# Patient Record
Sex: FEMALE | Race: WHITE | NOT HISPANIC OR LATINO | Employment: OTHER | URBAN - METROPOLITAN AREA
[De-identification: names, ages, dates, MRNs, and addresses within clinical notes are randomized per-mention and may not be internally consistent; named-entity substitution may affect disease eponyms.]

---

## 2022-03-09 DIAGNOSIS — Z11.59 ENCOUNTER FOR SCREENING FOR OTHER VIRAL DISEASES: Primary | ICD-10-CM

## 2022-03-14 DIAGNOSIS — K62.1 RECTAL POLYP: Primary | ICD-10-CM

## 2022-03-15 ENCOUNTER — APPOINTMENT (OUTPATIENT)
Dept: LAB | Facility: CLINIC | Age: 72
End: 2022-03-15
Payer: MEDICARE

## 2022-03-15 ENCOUNTER — HOSPITAL ENCOUNTER (OUTPATIENT)
Facility: CLINIC | Age: 72
Discharge: HOME | End: 2022-03-15
Attending: COLON & RECTAL SURGERY | Admitting: COLON & RECTAL SURGERY
Payer: MEDICARE

## 2022-03-15 VITALS
HEART RATE: 71 BPM | TEMPERATURE: 97.5 F | DIASTOLIC BLOOD PRESSURE: 73 MMHG | RESPIRATION RATE: 12 BRPM | OXYGEN SATURATION: 95 % | SYSTOLIC BLOOD PRESSURE: 121 MMHG

## 2022-03-15 LAB — FLEXIBLE SIGMOIDOSCOPY: NORMAL

## 2022-03-15 PROCEDURE — 250N000011 HC RX IP 250 OP 636: Performed by: COLON & RECTAL SURGERY

## 2022-03-15 PROCEDURE — G0500 MOD SEDAT ENDO SERVICE >5YRS: HCPCS | Performed by: COLON & RECTAL SURGERY

## 2022-03-15 PROCEDURE — 88305 TISSUE EXAM BY PATHOLOGIST: CPT | Mod: TC | Performed by: COLON & RECTAL SURGERY

## 2022-03-15 PROCEDURE — 45335 SIGMOIDOSCOPY W/SUBMUC INJ: CPT | Performed by: COLON & RECTAL SURGERY

## 2022-03-15 PROCEDURE — 45331 SIGMOIDOSCOPY AND BIOPSY: CPT | Performed by: COLON & RECTAL SURGERY

## 2022-03-15 PROCEDURE — 999N000099 HC STATISTIC MODERATE SEDATION < 10 MIN: Performed by: COLON & RECTAL SURGERY

## 2022-03-15 RX ORDER — PROCHLORPERAZINE MALEATE 5 MG
5 TABLET ORAL EVERY 6 HOURS PRN
Status: DISCONTINUED | OUTPATIENT
Start: 2022-03-15 | End: 2022-03-15 | Stop reason: HOSPADM

## 2022-03-15 RX ORDER — ONDANSETRON 4 MG/1
4 TABLET, ORALLY DISINTEGRATING ORAL EVERY 6 HOURS PRN
Status: DISCONTINUED | OUTPATIENT
Start: 2022-03-15 | End: 2022-03-15 | Stop reason: HOSPADM

## 2022-03-15 RX ORDER — ONDANSETRON 2 MG/ML
4 INJECTION INTRAMUSCULAR; INTRAVENOUS
Status: DISCONTINUED | OUTPATIENT
Start: 2022-03-15 | End: 2022-03-15 | Stop reason: HOSPADM

## 2022-03-15 RX ORDER — NALOXONE HYDROCHLORIDE 0.4 MG/ML
0.2 INJECTION, SOLUTION INTRAMUSCULAR; INTRAVENOUS; SUBCUTANEOUS
Status: DISCONTINUED | OUTPATIENT
Start: 2022-03-15 | End: 2022-03-15 | Stop reason: HOSPADM

## 2022-03-15 RX ORDER — FENTANYL CITRATE 0.05 MG/ML
25 INJECTION, SOLUTION INTRAMUSCULAR; INTRAVENOUS
Status: DISCONTINUED | OUTPATIENT
Start: 2022-03-15 | End: 2022-03-15 | Stop reason: HOSPADM

## 2022-03-15 RX ORDER — SPIRONOLACTONE 50 MG/1
25 TABLET, FILM COATED ORAL DAILY
COMMUNITY
Start: 2021-11-01 | End: 2022-12-01

## 2022-03-15 RX ORDER — IRBESARTAN 300 MG/1
150 TABLET ORAL DAILY
COMMUNITY
Start: 2021-12-30

## 2022-03-15 RX ORDER — LEVOTHYROXINE SODIUM 25 UG/1
1 TABLET ORAL DAILY
COMMUNITY
Start: 2022-01-04 | End: 2023-01-04

## 2022-03-15 RX ORDER — AMLODIPINE BESYLATE 10 MG/1
10 TABLET ORAL DAILY
COMMUNITY
Start: 2021-09-15 | End: 2022-12-01

## 2022-03-15 RX ORDER — ATROPINE SULFATE 0.4 MG/ML
0.4 AMPUL (ML) INJECTION
Status: DISCONTINUED | OUTPATIENT
Start: 2022-03-15 | End: 2022-03-15 | Stop reason: HOSPADM

## 2022-03-15 RX ORDER — TORSEMIDE 20 MG/1
20 TABLET ORAL 2 TIMES DAILY
Status: ON HOLD | COMMUNITY
Start: 2021-12-30 | End: 2022-05-17

## 2022-03-15 RX ORDER — NALOXONE HYDROCHLORIDE 0.4 MG/ML
0.4 INJECTION, SOLUTION INTRAMUSCULAR; INTRAVENOUS; SUBCUTANEOUS
Status: DISCONTINUED | OUTPATIENT
Start: 2022-03-15 | End: 2022-03-15 | Stop reason: HOSPADM

## 2022-03-15 RX ORDER — FLUMAZENIL 0.1 MG/ML
0.2 INJECTION, SOLUTION INTRAVENOUS
Status: DISCONTINUED | OUTPATIENT
Start: 2022-03-15 | End: 2022-03-15 | Stop reason: HOSPADM

## 2022-03-15 RX ORDER — AMIODARONE HYDROCHLORIDE 200 MG/1
1 TABLET ORAL DAILY
COMMUNITY
Start: 2021-12-30

## 2022-03-15 RX ORDER — LIDOCAINE 40 MG/G
CREAM TOPICAL
Status: DISCONTINUED | OUTPATIENT
Start: 2022-03-15 | End: 2022-03-15 | Stop reason: HOSPADM

## 2022-03-15 RX ORDER — ALBUTEROL SULFATE 90 UG/1
2 AEROSOL, METERED RESPIRATORY (INHALATION) 4 TIMES DAILY PRN
COMMUNITY
Start: 2020-07-29

## 2022-03-15 RX ORDER — ATORVASTATIN CALCIUM 80 MG/1
80 TABLET, FILM COATED ORAL AT BEDTIME
COMMUNITY
Start: 2021-09-15

## 2022-03-15 RX ORDER — FENTANYL CITRATE 0.05 MG/ML
25-50 INJECTION, SOLUTION INTRAMUSCULAR; INTRAVENOUS
Status: COMPLETED | OUTPATIENT
Start: 2022-03-15 | End: 2022-03-15

## 2022-03-15 RX ORDER — SIMETHICONE 40MG/0.6ML
133 SUSPENSION, DROPS(FINAL DOSAGE FORM)(ML) ORAL
Status: DISCONTINUED | OUTPATIENT
Start: 2022-03-15 | End: 2022-03-15 | Stop reason: HOSPADM

## 2022-03-15 RX ORDER — ONDANSETRON 2 MG/ML
4 INJECTION INTRAMUSCULAR; INTRAVENOUS EVERY 6 HOURS PRN
Status: DISCONTINUED | OUTPATIENT
Start: 2022-03-15 | End: 2022-03-15 | Stop reason: HOSPADM

## 2022-03-15 RX ORDER — CARVEDILOL 12.5 MG/1
12.5 TABLET ORAL 2 TIMES DAILY WITH MEALS
COMMUNITY
Start: 2021-08-18 | End: 2022-12-07

## 2022-03-15 RX ORDER — ESCITALOPRAM OXALATE 20 MG/1
20 TABLET ORAL DAILY
COMMUNITY
Start: 2021-12-30 | End: 2022-12-30

## 2022-03-15 RX ORDER — EPINEPHRINE 1 MG/ML
0.1 INJECTION, SOLUTION INTRAMUSCULAR; SUBCUTANEOUS
Status: DISCONTINUED | OUTPATIENT
Start: 2022-03-15 | End: 2022-03-15 | Stop reason: HOSPADM

## 2022-03-15 RX ADMIN — MIDAZOLAM 1 MG: 1 INJECTION INTRAMUSCULAR; INTRAVENOUS at 07:50

## 2022-03-15 RX ADMIN — FENTANYL CITRATE 100 MCG: 0.05 INJECTION, SOLUTION INTRAMUSCULAR; INTRAVENOUS at 07:50

## 2022-03-15 NOTE — H&P
Pre-Endoscopy History and Physical     Janet Figueroa MRN# [id number]   YOB: 1950 Age: 71 year old     Date of Procedure: 3/15/2022  Primary care provider: System, Provider Not In  Type of Endoscopy: colonoscopy  Reason for Procedure: malignant rectal polyp  Type of Anesthesia Anticipated: Moderate Sedation    HPI:    Janet is a 71 year old female who will be undergoing the above procedure.      A history and physical has been performed. The patient's medications and allergies have been reviewed. The risks and benefits of the procedure and the sedation options and risks were discussed with the patient.  All questions were answered and informed consent was obtained.      She denies a personal or family history of anesthesia complications or bleeding disorders.     Allergies   Allergen Reactions     Ciprofloxacin Hives     Alendronic Acid Other (See Comments)     HUT Comment: severe esophageal reflux, jaw pain, arm pain  severe esophageal reflux, jaw pain, arm pain       Clopidogrel Other (See Comments)     Increased bruising and bloody nose  Increased bruising and bloody nose       Codeine Other (See Comments) and Itching     Digoxin Nausea and Vomiting     Lisinopril Cough and Other (See Comments)     HUT Reaction: Cough       Risedronate      HUT Comment: severe esophageal reflux, jaw and arm pain  severe esophageal reflux, jaw and arm pain       Adhesive Tape Rash     And telemetry adhesive     Cephalexin Rash     Chromium Rash        Prior to Admission Medications   Prescriptions Last Dose Informant Patient Reported? Taking?   albuterol (PROAIR HFA/PROVENTIL HFA/VENTOLIN HFA) 108 (90 Base) MCG/ACT inhaler   Yes Yes   Sig: Inhale 2 puffs into the lungs 4 times daily as needed   amLODIPine (NORVASC) 5 MG tablet 3/15/2022 at Unknown time  Yes Yes   Sig: Take 1 tablet by mouth daily   amiodarone (PACERONE) 200 MG tablet 3/15/2022 at Unknown time  Yes Yes   Sig: Take 1 tablet by mouth daily    apixaban ANTICOAGULANT (ELIQUIS) 5 MG tablet 3/12/2022  Yes Yes   Sig: Take 5 mg by mouth 2 times daily   atorvastatin (LIPITOR) 80 MG tablet   Yes Yes   Sig: Take 80 mg by mouth At Bedtime   carvedilol (COREG) 12.5 MG tablet 3/15/2022 at Unknown time  Yes Yes   Sig: Take 12.5 mg by mouth 2 times daily (with meals)   escitalopram (LEXAPRO) 10 MG tablet   Yes Yes   Sig: Take 1 tablet by mouth daily   irbesartan (AVAPRO) 300 MG tablet   Yes Yes   Sig: Take 1 tablet by mouth daily   levothyroxine (SYNTHROID/LEVOTHROID) 25 MCG tablet   Yes Yes   Sig: Take 1 tablet by mouth daily   spironolactone (ALDACTONE) 25 MG tablet   Yes Yes   Sig: Take 1 tablet by mouth daily   torsemide (DEMADEX) 20 MG tablet   Yes Yes   Sig: Take 20 mg by mouth   umeclidinium (INCRUSE ELLIPTA) 62.5 MCG/INH inhaler   Yes Yes   Sig: Inhale 1 puff into the lungs daily      Facility-Administered Medications: None       There is no problem list on file for this patient.       No past medical history on file.     No past surgical history on file.    Social History     Tobacco Use     Smoking status: Not on file     Smokeless tobacco: Not on file   Substance Use Topics     Alcohol use: Not on file       No family history on file.    REVIEW OF SYSTEMS:     5 point ROS negative except as noted above in HPI, including Gen., Resp., CV, GI &  system review.      PHYSICAL EXAM:   BP (!) 129/91   Pulse 60   Temp 97.5  F (36.4  C) (Temporal)   Resp 24   SpO2 97%  There is no height or weight on file to calculate BMI.   GENERAL APPEARANCE: healthy and alert  MENTAL STATUS: alert  AIRWAY EXAM: Mallampatti Class II (visualization of the soft palate, fauces, and uvula)  RESP: lungs clear to auscultation - no rales, rhonchi or wheezes  CV: regular rates and rhythm      DIAGNOSTICS:    Not indicated      IMPRESSION   ASA Class 2 - Mild systemic disease        PLAN:       Plan for colonoscopy. We discussed the risks, benefits and alternatives and the patient  wished to proceed.    The above has been forwarded to the consulting provider.      Signed Electronically by: Lay Connolly MD, MD  March 15, 2022

## 2022-03-16 LAB
PATH REPORT.COMMENTS IMP SPEC: ABNORMAL
PATH REPORT.COMMENTS IMP SPEC: YES
PATH REPORT.FINAL DX SPEC: ABNORMAL
PATH REPORT.GROSS SPEC: ABNORMAL
PATH REPORT.MICROSCOPIC SPEC OTHER STN: ABNORMAL
PATH REPORT.RELEVANT HX SPEC: ABNORMAL
PATH REPORT.SITE OF ORIGIN SPEC: ABNORMAL
PATHOLOGY SYNOPTIC REPORT: ABNORMAL

## 2022-03-21 LAB
PATH REPORT.COMMENTS IMP SPEC: NORMAL
PATH REPORT.COMMENTS IMP SPEC: NORMAL
PATH REPORT.FINAL DX SPEC: NORMAL
PATH REPORT.GROSS SPEC: NORMAL
PATH REPORT.MICROSCOPIC SPEC OTHER STN: NORMAL
PATH REPORT.RELEVANT HX SPEC: NORMAL
PHOTO IMAGE: NORMAL

## 2022-03-21 PROCEDURE — 88305 TISSUE EXAM BY PATHOLOGIST: CPT | Mod: 26 | Performed by: PATHOLOGY

## 2022-04-18 DIAGNOSIS — Z11.59 ENCOUNTER FOR SCREENING FOR OTHER VIRAL DISEASES: Primary | ICD-10-CM

## 2022-04-25 ENCOUNTER — TRANSFERRED RECORDS (OUTPATIENT)
Dept: MULTI SPECIALTY CLINIC | Facility: CLINIC | Age: 72
End: 2022-04-25
Payer: COMMERCIAL

## 2022-04-25 LAB
ALT SERPL-CCNC: 76 U/L (ref 0–55)
AST SERPL-CCNC: 60 U/L (ref 10–40)
CHOLESTEROL (EXTERNAL): 174 MG/DL (ref 0–199)
CREATININE (EXTERNAL): 1.39 MG/DL (ref 0.55–1.02)
GFR ESTIMATED (EXTERNAL): 40 ML/MIN/1.73M2
GLUCOSE (EXTERNAL): 89 MG/DL (ref 70–100)
HDLC SERPL-MCNC: 64 MG/DL
LDL CHOLESTEROL CALCULATED (EXTERNAL): 93 MG/DL
NON HDL CHOLESTEROL (EXTERNAL): 110 MG/DL
POTASSIUM (EXTERNAL): 4 MMOL/L (ref 3.5–5.1)
TRIGLYCERIDES (EXTERNAL): 86 MG/DL
TSH SERPL-ACNC: 4.05 UIU/ML (ref 0.3–4.5)

## 2022-04-27 RX ORDER — ACETAMINOPHEN 500 MG
500-1000 TABLET ORAL EVERY 6 HOURS PRN
COMMUNITY

## 2022-04-27 RX ORDER — NEOMYCIN SULFATE 500 MG/1
500 TABLET ORAL SEE ADMIN INSTRUCTIONS
Status: ON HOLD | COMMUNITY
End: 2022-05-17

## 2022-04-27 RX ORDER — METRONIDAZOLE 500 MG/1
500 TABLET ORAL SEE ADMIN INSTRUCTIONS
Status: ON HOLD | COMMUNITY
End: 2022-05-17

## 2022-04-27 NOTE — PROGRESS NOTES
PTA medications updated by Medication Scribe prior to surgery via phone call with patient (last doses completed by Nurse)     Medication history sources: Patient, Surescripts and H&P  In the past week, patient estimated taking medication this percent of the time: Greater than 90%  Adherence assessment: N/A Not Observed    Significant changes made to the medication list:  None      Additional medication history information:   Patient was advised to bring: Incuse Ellipta Inhaler    Medication reconciliation completed by provider prior to medication history? No    Time spent in this activity: 45 minutes    The information provided in this note is only as accurate as the sources available at the time of update(s)    Prior to Admission medications    Medication Sig Last Dose Taking? Auth Provider   acetaminophen (TYLENOL) 500 MG tablet Take 500-1,000 mg by mouth every 6 hours as needed for mild pain  at prn Yes Reported, Patient   albuterol (PROAIR HFA/PROVENTIL HFA/VENTOLIN HFA) 108 (90 Base) MCG/ACT inhaler Inhale 2 puffs into the lungs 4 times daily as needed  at prn Yes Reported, Patient   amiodarone (PACERONE) 200 MG tablet Take 1 tablet by mouth daily  at am Yes Reported, Patient   amLODIPine (NORVASC) 5 MG tablet Take 1 tablet by mouth daily  at am Yes Reported, Patient   apixaban ANTICOAGULANT (ELIQUIS) 5 MG tablet Take 5 mg by mouth 2 times daily 4/22/2022 at pm Yes Reported, Patient   atorvastatin (LIPITOR) 80 MG tablet Take 80 mg by mouth At Bedtime 4/27/2022 at pm Yes Reported, Patient   carvedilol (COREG) 12.5 MG tablet Take 12.5 mg by mouth 2 times daily (with meals)  at am Yes Reported, Patient   escitalopram (LEXAPRO) 10 MG tablet Take 1 tablet by mouth daily 4/27/2022 at am Yes Reported, Patient   irbesartan (AVAPRO) 300 MG tablet Take 1 tablet by mouth daily 4/26/2022 at am Yes Reported, Patient   levothyroxine (SYNTHROID/LEVOTHROID) 25 MCG tablet Take 1 tablet by mouth daily  at am Yes Reported,  Patient   spironolactone (ALDACTONE) 50 MG tablet Take 25 mg by mouth daily (0.5 x 50 mg = 25 mg) 4/27/2022 at am Yes Reported, Patient   torsemide (DEMADEX) 20 MG tablet Take 20 mg by mouth 2 times daily 4/27/2022 at am Yes Reported, Patient   umeclidinium (INCRUSE ELLIPTA) 62.5 MCG/INH inhaler Inhale 1 puff into the lungs daily  at am Yes Reported, Patient   metroNIDAZOLE (FLAGYL) 500 MG tablet Take 500 mg by mouth See Admin Instructions Three times day before surgery; at 1pm, 2pm, and 11pm 4/27/2022 at 2300  Reported, Patient   neomycin (MYCIFRADIN) 500 MG tablet Take 500 mg by mouth See Admin Instructions Three times day before surgery; at 1pm, 2pm, and 11pm 4/27/2022 at 2300  Reported, Patient     Medication history completed by:    Zachary Garcia CPhT  Medication [facility]

## 2022-04-28 ENCOUNTER — ANESTHESIA (OUTPATIENT)
Dept: SURGERY | Facility: CLINIC | Age: 72
DRG: 330 | End: 2022-04-28
Payer: MEDICARE

## 2022-04-28 ENCOUNTER — HOSPITAL ENCOUNTER (INPATIENT)
Facility: CLINIC | Age: 72
LOS: 19 days | Discharge: HOME HEALTH | DRG: 330 | End: 2022-05-17
Attending: COLON & RECTAL SURGERY | Admitting: COLON & RECTAL SURGERY
Payer: MEDICARE

## 2022-04-28 ENCOUNTER — ANESTHESIA EVENT (OUTPATIENT)
Dept: SURGERY | Facility: CLINIC | Age: 72
DRG: 330 | End: 2022-04-28
Payer: MEDICARE

## 2022-04-28 DIAGNOSIS — R60.9 EDEMA, UNSPECIFIED TYPE: ICD-10-CM

## 2022-04-28 DIAGNOSIS — C20 RECTAL CANCER (H): Primary | ICD-10-CM

## 2022-04-28 DIAGNOSIS — N32.81 OVERACTIVE BLADDER: ICD-10-CM

## 2022-04-28 LAB
ABO/RH(D): NORMAL
ANION GAP SERPL CALCULATED.3IONS-SCNC: 3 MMOL/L (ref 3–14)
ANTIBODY SCREEN: NEGATIVE
BUN SERPL-MCNC: 18 MG/DL (ref 7–30)
CALCIUM SERPL-MCNC: 9.8 MG/DL (ref 8.5–10.1)
CEA SERPL-MCNC: 1 UG/L (ref 0–2.5)
CHLORIDE BLD-SCNC: 110 MMOL/L (ref 94–109)
CO2 SERPL-SCNC: 25 MMOL/L (ref 20–32)
CREAT SERPL-MCNC: 1.18 MG/DL (ref 0.52–1.04)
ERYTHROCYTE [DISTWIDTH] IN BLOOD BY AUTOMATED COUNT: 13.2 % (ref 10–15)
GFR SERPL CREATININE-BSD FRML MDRD: 49 ML/MIN/1.73M2
GLUCOSE BLD-MCNC: 108 MG/DL (ref 70–99)
HCT VFR BLD AUTO: 42.8 % (ref 35–47)
HGB BLD-MCNC: 14.1 G/DL (ref 11.7–15.7)
MCH RBC QN AUTO: 34.1 PG (ref 26.5–33)
MCHC RBC AUTO-ENTMCNC: 32.9 G/DL (ref 31.5–36.5)
MCV RBC AUTO: 104 FL (ref 78–100)
PLATELET # BLD AUTO: 319 10E3/UL (ref 150–450)
POTASSIUM BLD-SCNC: 4.1 MMOL/L (ref 3.4–5.3)
RBC # BLD AUTO: 4.13 10E6/UL (ref 3.8–5.2)
SODIUM SERPL-SCNC: 138 MMOL/L (ref 133–144)
SPECIMEN EXPIRATION DATE: NORMAL
WBC # BLD AUTO: 5.5 10E3/UL (ref 4–11)

## 2022-04-28 PROCEDURE — 370N000017 HC ANESTHESIA TECHNICAL FEE, PER MIN: Performed by: COLON & RECTAL SURGERY

## 2022-04-28 PROCEDURE — 999N000197 HC STATISTIC WOC PT EDUCATION, 0-15 MIN

## 2022-04-28 PROCEDURE — 36415 COLL VENOUS BLD VENIPUNCTURE: CPT | Performed by: COLON & RECTAL SURGERY

## 2022-04-28 PROCEDURE — 258N000003 HC RX IP 258 OP 636: Performed by: COLON & RECTAL SURGERY

## 2022-04-28 PROCEDURE — 250N000011 HC RX IP 250 OP 636: Performed by: NURSE ANESTHETIST, CERTIFIED REGISTERED

## 2022-04-28 PROCEDURE — 250N000009 HC RX 250: Performed by: COLON & RECTAL SURGERY

## 2022-04-28 PROCEDURE — 360N000080 HC SURGERY LEVEL 7, PER MIN: Performed by: COLON & RECTAL SURGERY

## 2022-04-28 PROCEDURE — 120N000001 HC R&B MED SURG/OB

## 2022-04-28 PROCEDURE — 0D1B4Z4 BYPASS ILEUM TO CUTANEOUS, PERCUTANEOUS ENDOSCOPIC APPROACH: ICD-10-PCS | Performed by: COLON & RECTAL SURGERY

## 2022-04-28 PROCEDURE — 80048 BASIC METABOLIC PNL TOTAL CA: CPT | Performed by: COLON & RECTAL SURGERY

## 2022-04-28 PROCEDURE — 250N000011 HC RX IP 250 OP 636: Performed by: COLON & RECTAL SURGERY

## 2022-04-28 PROCEDURE — 258N000003 HC RX IP 258 OP 636: Performed by: ANESTHESIOLOGY

## 2022-04-28 PROCEDURE — 0DBP4ZZ EXCISION OF RECTUM, PERCUTANEOUS ENDOSCOPIC APPROACH: ICD-10-PCS | Performed by: COLON & RECTAL SURGERY

## 2022-04-28 PROCEDURE — 82378 CARCINOEMBRYONIC ANTIGEN: CPT | Performed by: COLON & RECTAL SURGERY

## 2022-04-28 PROCEDURE — 250N000013 HC RX MED GY IP 250 OP 250 PS 637: Performed by: COLON & RECTAL SURGERY

## 2022-04-28 PROCEDURE — 4A1BXSH MONITORING OF GASTROINTESTINAL VASCULAR PERFUSION USING INDOCYANINE GREEN DYE, EXTERNAL APPROACH: ICD-10-PCS | Performed by: COLON & RECTAL SURGERY

## 2022-04-28 PROCEDURE — 272N000001 HC OR GENERAL SUPPLY STERILE: Performed by: COLON & RECTAL SURGERY

## 2022-04-28 PROCEDURE — 85027 COMPLETE CBC AUTOMATED: CPT | Performed by: COLON & RECTAL SURGERY

## 2022-04-28 PROCEDURE — 250N000009 HC RX 250

## 2022-04-28 PROCEDURE — 999N000141 HC STATISTIC PRE-PROCEDURE NURSING ASSESSMENT: Performed by: COLON & RECTAL SURGERY

## 2022-04-28 PROCEDURE — 88309 TISSUE EXAM BY PATHOLOGIST: CPT | Mod: TC | Performed by: COLON & RECTAL SURGERY

## 2022-04-28 PROCEDURE — 8E0W4CZ ROBOTIC ASSISTED PROCEDURE OF TRUNK REGION, PERCUTANEOUS ENDOSCOPIC APPROACH: ICD-10-PCS | Performed by: COLON & RECTAL SURGERY

## 2022-04-28 PROCEDURE — 710N000009 HC RECOVERY PHASE 1, LEVEL 1, PER MIN: Performed by: COLON & RECTAL SURGERY

## 2022-04-28 PROCEDURE — 86901 BLOOD TYPING SEROLOGIC RH(D): CPT | Performed by: COLON & RECTAL SURGERY

## 2022-04-28 PROCEDURE — 250N000009 HC RX 250: Performed by: NURSE ANESTHETIST, CERTIFIED REGISTERED

## 2022-04-28 PROCEDURE — 0DBN4ZZ EXCISION OF SIGMOID COLON, PERCUTANEOUS ENDOSCOPIC APPROACH: ICD-10-PCS | Performed by: COLON & RECTAL SURGERY

## 2022-04-28 PROCEDURE — 258N000001 HC RX 258: Performed by: COLON & RECTAL SURGERY

## 2022-04-28 PROCEDURE — 250N000025 HC SEVOFLURANE, PER MIN: Performed by: COLON & RECTAL SURGERY

## 2022-04-28 PROCEDURE — 0DJD8ZZ INSPECTION OF LOWER INTESTINAL TRACT, VIA NATURAL OR ARTIFICIAL OPENING ENDOSCOPIC: ICD-10-PCS | Performed by: COLON & RECTAL SURGERY

## 2022-04-28 RX ORDER — CLINDAMYCIN PHOSPHATE 900 MG/50ML
900 INJECTION, SOLUTION INTRAVENOUS
Status: COMPLETED | OUTPATIENT
Start: 2022-04-28 | End: 2022-04-28

## 2022-04-28 RX ORDER — ONDANSETRON 2 MG/ML
4 INJECTION INTRAMUSCULAR; INTRAVENOUS EVERY 30 MIN PRN
Status: DISCONTINUED | OUTPATIENT
Start: 2022-04-28 | End: 2022-04-28 | Stop reason: HOSPADM

## 2022-04-28 RX ORDER — SODIUM CHLORIDE, SODIUM LACTATE, POTASSIUM CHLORIDE, CALCIUM CHLORIDE 600; 310; 30; 20 MG/100ML; MG/100ML; MG/100ML; MG/100ML
INJECTION, SOLUTION INTRAVENOUS CONTINUOUS
Status: DISCONTINUED | OUTPATIENT
Start: 2022-04-28 | End: 2022-05-02

## 2022-04-28 RX ORDER — LIDOCAINE HYDROCHLORIDE 20 MG/ML
INJECTION, SOLUTION INFILTRATION; PERINEURAL PRN
Status: DISCONTINUED | OUTPATIENT
Start: 2022-04-28 | End: 2022-04-28

## 2022-04-28 RX ORDER — OXYCODONE HYDROCHLORIDE 5 MG/1
5 TABLET ORAL EVERY 4 HOURS PRN
Status: DISCONTINUED | OUTPATIENT
Start: 2022-04-28 | End: 2022-04-28 | Stop reason: HOSPADM

## 2022-04-28 RX ORDER — BUPIVACAINE HYDROCHLORIDE AND EPINEPHRINE 5; 5 MG/ML; UG/ML
INJECTION, SOLUTION PERINEURAL PRN
Status: DISCONTINUED | OUTPATIENT
Start: 2022-04-28 | End: 2022-04-28 | Stop reason: HOSPADM

## 2022-04-28 RX ORDER — HEPARIN SODIUM 5000 [USP'U]/.5ML
5000 INJECTION, SOLUTION INTRAVENOUS; SUBCUTANEOUS
Status: COMPLETED | OUTPATIENT
Start: 2022-04-28 | End: 2022-04-28

## 2022-04-28 RX ORDER — CLINDAMYCIN PHOSPHATE 900 MG/50ML
900 INJECTION, SOLUTION INTRAVENOUS SEE ADMIN INSTRUCTIONS
Status: DISCONTINUED | OUTPATIENT
Start: 2022-04-28 | End: 2022-04-28 | Stop reason: HOSPADM

## 2022-04-28 RX ORDER — GLYCOPYRROLATE 0.2 MG/ML
INJECTION, SOLUTION INTRAMUSCULAR; INTRAVENOUS PRN
Status: DISCONTINUED | OUTPATIENT
Start: 2022-04-28 | End: 2022-04-28

## 2022-04-28 RX ORDER — HYDROMORPHONE HYDROCHLORIDE 1 MG/ML
0.5 INJECTION, SOLUTION INTRAMUSCULAR; INTRAVENOUS; SUBCUTANEOUS EVERY 5 MIN PRN
Status: DISCONTINUED | OUTPATIENT
Start: 2022-04-28 | End: 2022-04-28 | Stop reason: HOSPADM

## 2022-04-28 RX ORDER — MAGNESIUM HYDROXIDE 1200 MG/15ML
LIQUID ORAL PRN
Status: DISCONTINUED | OUTPATIENT
Start: 2022-04-28 | End: 2022-04-28 | Stop reason: HOSPADM

## 2022-04-28 RX ORDER — NALOXONE HYDROCHLORIDE 0.4 MG/ML
0.4 INJECTION, SOLUTION INTRAMUSCULAR; INTRAVENOUS; SUBCUTANEOUS
Status: DISCONTINUED | OUTPATIENT
Start: 2022-04-28 | End: 2022-05-17 | Stop reason: HOSPADM

## 2022-04-28 RX ORDER — PROPOFOL 10 MG/ML
INJECTION, EMULSION INTRAVENOUS PRN
Status: DISCONTINUED | OUTPATIENT
Start: 2022-04-28 | End: 2022-04-28

## 2022-04-28 RX ORDER — OXYCODONE HYDROCHLORIDE 5 MG/1
5 TABLET ORAL EVERY 4 HOURS PRN
Status: DISCONTINUED | OUTPATIENT
Start: 2022-04-28 | End: 2022-05-12

## 2022-04-28 RX ORDER — HYDROMORPHONE HCL IN WATER/PF 6 MG/30 ML
0.2 PATIENT CONTROLLED ANALGESIA SYRINGE INTRAVENOUS
Status: DISCONTINUED | OUTPATIENT
Start: 2022-04-28 | End: 2022-05-08

## 2022-04-28 RX ORDER — ONDANSETRON 2 MG/ML
4 INJECTION INTRAMUSCULAR; INTRAVENOUS EVERY 6 HOURS PRN
Status: DISCONTINUED | OUTPATIENT
Start: 2022-04-28 | End: 2022-05-17 | Stop reason: HOSPADM

## 2022-04-28 RX ORDER — ENOXAPARIN SODIUM 100 MG/ML
40 INJECTION SUBCUTANEOUS EVERY 24 HOURS
Status: DISCONTINUED | OUTPATIENT
Start: 2022-04-29 | End: 2022-05-03

## 2022-04-28 RX ORDER — LEVOTHYROXINE SODIUM 25 UG/1
25 TABLET ORAL
Status: DISCONTINUED | OUTPATIENT
Start: 2022-04-29 | End: 2022-05-12

## 2022-04-28 RX ORDER — ONDANSETRON 2 MG/ML
INJECTION INTRAMUSCULAR; INTRAVENOUS PRN
Status: DISCONTINUED | OUTPATIENT
Start: 2022-04-28 | End: 2022-04-28

## 2022-04-28 RX ORDER — NEOSTIGMINE METHYLSULFATE 1 MG/ML
VIAL (ML) INJECTION PRN
Status: DISCONTINUED | OUTPATIENT
Start: 2022-04-28 | End: 2022-04-28

## 2022-04-28 RX ORDER — ACETAMINOPHEN 325 MG/1
650 TABLET ORAL EVERY 4 HOURS PRN
Status: DISCONTINUED | OUTPATIENT
Start: 2022-05-01 | End: 2022-05-12

## 2022-04-28 RX ORDER — EPHEDRINE SULFATE 50 MG/ML
INJECTION, SOLUTION INTRAMUSCULAR; INTRAVENOUS; SUBCUTANEOUS PRN
Status: DISCONTINUED | OUTPATIENT
Start: 2022-04-28 | End: 2022-04-28

## 2022-04-28 RX ORDER — PROPOFOL 10 MG/ML
INJECTION, EMULSION INTRAVENOUS CONTINUOUS PRN
Status: DISCONTINUED | OUTPATIENT
Start: 2022-04-28 | End: 2022-04-28

## 2022-04-28 RX ORDER — ACETAMINOPHEN 325 MG/1
975 TABLET ORAL ONCE
Status: COMPLETED | OUTPATIENT
Start: 2022-04-28 | End: 2022-04-28

## 2022-04-28 RX ORDER — ACETAMINOPHEN 325 MG/1
975 TABLET ORAL EVERY 8 HOURS
Status: DISPENSED | OUTPATIENT
Start: 2022-04-28 | End: 2022-05-01

## 2022-04-28 RX ORDER — FENTANYL CITRATE 0.05 MG/ML
25 INJECTION, SOLUTION INTRAMUSCULAR; INTRAVENOUS EVERY 5 MIN PRN
Status: DISCONTINUED | OUTPATIENT
Start: 2022-04-28 | End: 2022-04-28 | Stop reason: HOSPADM

## 2022-04-28 RX ORDER — ESCITALOPRAM OXALATE 10 MG/1
10 TABLET ORAL DAILY
Status: DISCONTINUED | OUTPATIENT
Start: 2022-04-29 | End: 2022-05-12

## 2022-04-28 RX ORDER — ONDANSETRON 4 MG/1
4 TABLET, ORALLY DISINTEGRATING ORAL EVERY 6 HOURS PRN
Status: DISCONTINUED | OUTPATIENT
Start: 2022-04-28 | End: 2022-05-17 | Stop reason: HOSPADM

## 2022-04-28 RX ORDER — SODIUM CHLORIDE, SODIUM LACTATE, POTASSIUM CHLORIDE, CALCIUM CHLORIDE 600; 310; 30; 20 MG/100ML; MG/100ML; MG/100ML; MG/100ML
INJECTION, SOLUTION INTRAVENOUS CONTINUOUS
Status: DISCONTINUED | OUTPATIENT
Start: 2022-04-28 | End: 2022-04-28 | Stop reason: HOSPADM

## 2022-04-28 RX ORDER — FENTANYL CITRATE 50 UG/ML
INJECTION, SOLUTION INTRAMUSCULAR; INTRAVENOUS PRN
Status: DISCONTINUED | OUTPATIENT
Start: 2022-04-28 | End: 2022-04-28

## 2022-04-28 RX ORDER — NALOXONE HYDROCHLORIDE 0.4 MG/ML
0.2 INJECTION, SOLUTION INTRAMUSCULAR; INTRAVENOUS; SUBCUTANEOUS
Status: DISCONTINUED | OUTPATIENT
Start: 2022-04-28 | End: 2022-05-17 | Stop reason: HOSPADM

## 2022-04-28 RX ORDER — DEXAMETHASONE SODIUM PHOSPHATE 4 MG/ML
INJECTION, SOLUTION INTRA-ARTICULAR; INTRALESIONAL; INTRAMUSCULAR; INTRAVENOUS; SOFT TISSUE PRN
Status: DISCONTINUED | OUTPATIENT
Start: 2022-04-28 | End: 2022-04-28

## 2022-04-28 RX ORDER — ONDANSETRON 4 MG/1
4 TABLET, ORALLY DISINTEGRATING ORAL EVERY 30 MIN PRN
Status: DISCONTINUED | OUTPATIENT
Start: 2022-04-28 | End: 2022-04-28 | Stop reason: HOSPADM

## 2022-04-28 RX ADMIN — SODIUM CHLORIDE, POTASSIUM CHLORIDE, SODIUM LACTATE AND CALCIUM CHLORIDE: 600; 310; 30; 20 INJECTION, SOLUTION INTRAVENOUS at 11:48

## 2022-04-28 RX ADMIN — HEPARIN SODIUM 5000 UNITS: 5000 INJECTION, SOLUTION INTRAVENOUS; SUBCUTANEOUS at 12:38

## 2022-04-28 RX ADMIN — Medication 5 MG: at 13:15

## 2022-04-28 RX ADMIN — CLINDAMYCIN PHOSPHATE 900 MG: 900 INJECTION, SOLUTION INTRAVENOUS at 13:01

## 2022-04-28 RX ADMIN — ROCURONIUM BROMIDE 10 MG: 50 INJECTION, SOLUTION INTRAVENOUS at 13:35

## 2022-04-28 RX ADMIN — Medication 5 MG: at 13:32

## 2022-04-28 RX ADMIN — GENTAMICIN SULFATE 340 MG: 40 INJECTION, SOLUTION INTRAMUSCULAR; INTRAVENOUS at 12:30

## 2022-04-28 RX ADMIN — ROCURONIUM BROMIDE 10 MG: 50 INJECTION, SOLUTION INTRAVENOUS at 14:30

## 2022-04-28 RX ADMIN — ROCURONIUM BROMIDE 10 MG: 50 INJECTION, SOLUTION INTRAVENOUS at 18:51

## 2022-04-28 RX ADMIN — HYDROMORPHONE HYDROCHLORIDE 0.5 MG: 1 INJECTION, SOLUTION INTRAMUSCULAR; INTRAVENOUS; SUBCUTANEOUS at 14:03

## 2022-04-28 RX ADMIN — ROCURONIUM BROMIDE 50 MG: 50 INJECTION, SOLUTION INTRAVENOUS at 13:07

## 2022-04-28 RX ADMIN — PROPOFOL 200 MG: 10 INJECTION, EMULSION INTRAVENOUS at 13:07

## 2022-04-28 RX ADMIN — NEOSTIGMINE METHYLSULFATE 5 MG: 1 INJECTION, SOLUTION INTRAVENOUS at 21:07

## 2022-04-28 RX ADMIN — FENTANYL CITRATE 50 MCG: 50 INJECTION, SOLUTION INTRAMUSCULAR; INTRAVENOUS at 13:07

## 2022-04-28 RX ADMIN — FENTANYL CITRATE 50 MCG: 50 INJECTION, SOLUTION INTRAMUSCULAR; INTRAVENOUS at 13:32

## 2022-04-28 RX ADMIN — ROCURONIUM BROMIDE 10 MG: 50 INJECTION, SOLUTION INTRAVENOUS at 15:44

## 2022-04-28 RX ADMIN — CLINDAMYCIN PHOSPHATE 900 MG: 900 INJECTION, SOLUTION INTRAVENOUS at 19:01

## 2022-04-28 RX ADMIN — LIDOCAINE HYDROCHLORIDE 100 MG: 20 INJECTION, SOLUTION INFILTRATION; PERINEURAL at 13:07

## 2022-04-28 RX ADMIN — MIDAZOLAM 2 MG: 1 INJECTION INTRAMUSCULAR; INTRAVENOUS at 13:03

## 2022-04-28 RX ADMIN — HYDROMORPHONE HYDROCHLORIDE 0.5 MG: 1 INJECTION, SOLUTION INTRAMUSCULAR; INTRAVENOUS; SUBCUTANEOUS at 15:03

## 2022-04-28 RX ADMIN — ACETAMINOPHEN 975 MG: 325 TABLET ORAL at 11:49

## 2022-04-28 RX ADMIN — SODIUM CHLORIDE, POTASSIUM CHLORIDE, SODIUM LACTATE AND CALCIUM CHLORIDE: 600; 310; 30; 20 INJECTION, SOLUTION INTRAVENOUS at 19:31

## 2022-04-28 RX ADMIN — ROCURONIUM BROMIDE 10 MG: 50 INJECTION, SOLUTION INTRAVENOUS at 16:37

## 2022-04-28 RX ADMIN — SODIUM CHLORIDE, POTASSIUM CHLORIDE, SODIUM LACTATE AND CALCIUM CHLORIDE: 600; 310; 30; 20 INJECTION, SOLUTION INTRAVENOUS at 15:03

## 2022-04-28 RX ADMIN — Medication 5 MG: at 13:45

## 2022-04-28 RX ADMIN — GLYCOPYRROLATE 0.1 MG: 0.2 INJECTION, SOLUTION INTRAMUSCULAR; INTRAVENOUS at 13:51

## 2022-04-28 RX ADMIN — GLYCOPYRROLATE 0.8 MG: 0.2 INJECTION, SOLUTION INTRAMUSCULAR; INTRAVENOUS at 21:07

## 2022-04-28 RX ADMIN — ONDANSETRON 4 MG: 2 INJECTION INTRAMUSCULAR; INTRAVENOUS at 20:52

## 2022-04-28 RX ADMIN — PROPOFOL 30 MCG/KG/MIN: 10 INJECTION, EMULSION INTRAVENOUS at 13:15

## 2022-04-28 RX ADMIN — DEXAMETHASONE SODIUM PHOSPHATE 4 MG: 4 INJECTION, SOLUTION INTRA-ARTICULAR; INTRALESIONAL; INTRAMUSCULAR; INTRAVENOUS; SOFT TISSUE at 13:18

## 2022-04-28 ASSESSMENT — ACTIVITIES OF DAILY LIVING (ADL)
ADLS_ACUITY_SCORE: 3
ADLS_ACUITY_SCORE: 10
ADLS_ACUITY_SCORE: 3
ADLS_ACUITY_SCORE: 3
ADLS_ACUITY_SCORE: 12
ADLS_ACUITY_SCORE: 3

## 2022-04-28 ASSESSMENT — ENCOUNTER SYMPTOMS: DYSRHYTHMIAS: 1

## 2022-04-28 ASSESSMENT — COPD QUESTIONNAIRES
CAT_SEVERITY: MILD
COPD: 1

## 2022-04-28 ASSESSMENT — LIFESTYLE VARIABLES: TOBACCO_USE: 1

## 2022-04-28 NOTE — INTERVAL H&P NOTE
"I have reviewed the surgical (or preoperative) H&P that is linked to this encounter, and examined the patient. There are no significant changes    Clinical Conditions Present on Arrival:  Clinically Significant Risk Factors Present on Admission                 # Coagulation Defect: home medication list includes an anticoagulant medication   # Obesity: Estimated body mass index is 34.9 kg/m  as calculated from the following:    Height as of this encounter: 1.6 m (5' 3\").    Weight as of this encounter: 89.4 kg (197 lb).       "

## 2022-04-28 NOTE — LETTER
Home RN referral -Discharging today  Name: Janet Figueroa  : 1950  MRN #: [id number]  Primary Care Provider: Melissa Silva MD     Primary Clinic: [address] [place] [id number]     Reason for Hospitalization:  Rectal cancer (H) [C20]  Admit Date/Time: 2022 10:54 AM  Discharge Date: 2022  Payor Source: Payor: BCBS / Plan: BCBS PLATINUM BLUE / Product Type: PPO /     Home today. Orders attached. Thank you!    Fely Siegel RN   [facility]   Phone [phone]   Or [phone]

## 2022-04-28 NOTE — CONSULTS
"[facility] Nurse Ostomy Marking and Education         Data:     History:    ROBOTIC ASSISTED LOW ANTERIOR RESECTION WITH POSSIBLE LOOP ILEOSTOMY      Pt referred by Dr. Connolly    Who is present for marking and education: Pt marked in Preop    Type of ostomy surgery:  Ileostomy    Abdomen:  Large, round, few deep creases           Intervention:       Patient's chart evaluated.      Assessments done today:  Belt line, previous abdominal surgeries and scars, and abdominal muscles.  Pt observed lying, sitting and standing.     Education:  Reviewed upcoming surgery, stoma construction, post-op expectations, and general ostomy cares/concerns.      All patient / family questions answered.    Patient marked with \"x\" using surgical marker then allowed to dry 3 minutes and sealed with 3M Cavilon No Sting barrier film.  Pt marked RUQ          Assessment:       Abdomen marked on the RUQ to avoid creases and pants    Learning needs/ comprehension: Pt is retired hospice nurse, however has some confusion and poor experiences around ostomies and is very fearful to have one herself.          Plan:       Plan:   ? Surgery scheduled for:  Today, pt in preop    Will plan to follow patient post operatively Daily JOHNIE Nye RN CWOCN          "

## 2022-04-28 NOTE — ANESTHESIA PREPROCEDURE EVALUATION
Anesthesia Pre-Procedure Evaluation    Patient: Janet Figueroa   MRN: [id number] : 1950        Procedure : Procedure(s):  ROBOTIC ASSISTED LOW ANTERIOR RESECTION WITH POSSIBLE LOOP ILEOSTOMY          Past Medical History:   Diagnosis Date     Arrhythmia      COPD (chronic obstructive pulmonary disease) (H)      Coronary artery disease      Hypertension      Rectal cancer (H)      Renal disease       No past surgical history on file.   Allergies   Allergen Reactions     Ciprofloxacin Hives     Alendronic Acid Other (See Comments)     HUT Comment: severe esophageal reflux, jaw pain, arm pain  severe esophageal reflux, jaw pain, arm pain       Clopidogrel Other (See Comments)     Increased bruising and bloody nose  Increased bruising and bloody nose       Codeine Other (See Comments) and Itching     Digoxin Nausea and Vomiting     Lisinopril Cough and Other (See Comments)     HUT Reaction: Cough       Risedronate      HUT Comment: severe esophageal reflux, jaw and arm pain  severe esophageal reflux, jaw and arm pain       Adhesive Tape Rash     And telemetry adhesive     Cephalexin Rash     Chromium Rash      Social History     Tobacco Use     Smoking status: Former Smoker     Smokeless tobacco: Never Used   Substance Use Topics     Alcohol use: Not Currently      Wt Readings from Last 1 Encounters:   22 89.4 kg (197 lb)        Anesthesia Evaluation            ROS/MED HX  ENT/Pulmonary:     (+) GILBERT risk factors, hypertension, obese, tobacco use, Past use, mild,  COPD,  (-) sleep apnea   Neurologic:  - neg neurologic ROS     Cardiovascular:     (+) hypertension--CAD --stent-. 1 Drug Eluting Stent. dysrhythmias, a-fib, Previous cardiac testing   Echo: Date: Results:    Stress Test: Date:  Results:  Impressions   Abnormal study after pharmacologic stress. There was a small   amount of ischemia involving the anteroapical wall in the   territory of the left anterior descending coronary artery.    Left ventricular systolic function was normal. Based on   this study, the annual cardiovascular mortality rate is low   (less than 1%).     ECG Reviewed: Date: Results:    Cath: Date: 2019 Results:  Study conclusions   IMPRESSIONS: Proximal RCA has mild-moderate disease (40%),   this lesion is heavily calcified. Mid RCA stent is patent.   Mid LAD has 50% stenosis. FFR of this vessel is   non-significant (0.84).   Principal OM has a 50% ostial stenosis. FFR of this vessel   was non-significant (0.86).   LVEDP was severely eelvated at 29 mm Hg.       METS/Exercise Tolerance:     Hematologic:       Musculoskeletal:       GI/Hepatic:    (-) GERD   Renal/Genitourinary:     (+) renal disease, type: CRI,     Endo: Comment: Pre-DM    (+) thyroid problem, Obesity,     Psychiatric/Substance Use:     (+) psychiatric history depression     Infectious Disease:       Malignancy:   (+) Malignancy, History of GI.GI CA  Active status post Surgery.        Other:            Physical Exam    Airway        Mallampati: III   TM distance: > 3 FB   Neck ROM: full   Mouth opening: > 3 cm    Respiratory Devices and Support         Dental  no notable dental history         Cardiovascular   cardiovascular exam normal       Rhythm and rate: regular     Pulmonary           breath sounds clear to auscultation           OUTSIDE LABS:  CBC:   Lab Results   Component Value Date    WBC 5.5 04/28/2022    HGB 14.1 04/28/2022    HCT 42.8 04/28/2022     04/28/2022     BMP: No results found for: NA, POTASSIUM, CHLORIDE, CO2, BUN, CR, GLC  COAGS: No results found for: PTT, INR, FIBR  POC: No results found for: BGM, HCG, HCGS  HEPATIC: No results found for: ALBUMIN, PROTTOTAL, ALT, AST, GGT, ALKPHOS, BILITOTAL, BILIDIRECT, MIHIR  OTHER: No results found for: PH, LACT, A1C, GISELLE, PHOS, MAG, LIPASE, AMYLASE, TSH, T4, T3, CRP, SED    Anesthesia Plan    ASA Status:  3   NPO Status:  NPO Appropriate    Anesthesia Type: General.     - Airway: ETT    Induction: Intravenous, Propofol.   Maintenance: Balanced.        Consents    Anesthesia Plan(s) and associated risks, benefits, and realistic alternatives discussed. Questions answered and patient/representative(s) expressed understanding.    - Discussed:     - Discussed with:  Patient      - Extended Intubation/Ventilatory Support Discussed: No.      - Patient is DNR/DNI Status: No    Use of blood products discussed: No .     Postoperative Care    Pain management: Multi-modal analgesia.   PONV prophylaxis: Ondansetron (or other 5HT-3), Dexamethasone or Solumedrol     Comments:    Other Comments: Patient is counseled on the anesthesia plan and relevant anesthesia procedures including all risks and benefits. All patient questions were answered.               David Quispe MD

## 2022-04-28 NOTE — LETTER
Transition Communication Hand-off for Care Transitions to Next Level of Care Provider    Name: Janet Figueroa  : 1950  MRN #: [id number]  Primary Care Provider: Melissa Silva MD     Primary Clinic: [address] [place] [address] [place] [address]     Reason for Hospitalization:  Rectal cancer (H) [C20]  Admit Date/Time: 2022 10:54 AM  Discharge Date: 2022  Payor Source: Payor: BCBS / Plan: BCBS PLATINUM BLUE / Product Type: PPO /          Reason for Communication Hand-off Referral: Other home care at discharge    Discharge Plan: home today with Home care through [facility] for new ostomy support and post operative assessments.        Concern for non-adherence with plan of care:   Y/N No  Discharge Needs Assessment:  Needs    Flowsheet Row Most Recent Value   Equipment Currently Used at Home none   # of Referrals Placed by [facility]        Follow-up specialty is recommended: Yes    Follow-up plan:  No future appointments.    Any outstanding tests or procedures:        Referrals     Future Labs/Procedures    Home Care Referral     Comments:    Your provider has ordered home health services. If you have not been contacted within 2 days of your discharge please call the inpatient department phone number at [phone] .    You will be discharge home with [facility].  [facility] will contact you directly to arrange the first visit.      [facility]'s phone number is [phone].            Key Recommendations:  Home today with Home RN services.    Fely Siegel RN   [facility]   Phone [phone]    AVS/Discharge Summary is the source of truth; this is a helpful guide for improved communication of patient story

## 2022-04-28 NOTE — ANESTHESIA PROCEDURE NOTES
Airway       Patient location during procedure: OR       Procedure Start/Stop Times: 4/28/2022 1:09 PM  Staff -        Anesthesiologist:  David Quispe MD       CRNA: Jared Zafar APRN CRNA       Performed By: CRNA  Consent for Airway        Urgency: elective  Indications and Patient Condition       Indications for airway management: kalyan-procedural       Induction type:intravenous       Mask difficulty assessment: 1 - vent by mask    Final Airway Details       Final airway type: endotracheal airway       Successful airway: ETT - single and Oral  Endotracheal Airway Details        ETT size (mm): 7.0       Cuffed: yes       Successful intubation technique: video laryngoscopy       VL Blade Size: Fournier 3       Grade View of Cords: 1       Adjucts: stylet       Position: Right       Measured from: lips       Secured at (cm): 21       Bite block used: None    Post intubation assessment        Placement verified by: capnometry and chest rise        Number of attempts at approach: 1       Number of other approaches attempted: 0       Secured with: pink tape       Ease of procedure: easy       Dentition: Unchanged    Medication(s) Administered   Medication Administration Time: 4/28/2022 1:09 PM

## 2022-04-28 NOTE — LETTER
Home RN Referral  Name: Janet Figueroa  : 1950  MRN #: [id number]  Primary Care Provider: Melissa Silav MD     Primary Clinic: [address] [place] [id number]     Reason for Hospitalization:  Rectal cancer (H) [C20]  Admit Date/Time: 2022 10:54 AM  Discharge Date: TBD  Payor Source: Payor: BCBS / Plan: BCBS PLATINUM BLUE / Product Type: PPO /     Thank you for accepting this referral. I will keep you updated on her discharge date.    Fely Siegel, RN   [facility]   Phone [phone] or [phone]

## 2022-04-29 LAB
ANION GAP SERPL CALCULATED.3IONS-SCNC: 3 MMOL/L (ref 3–14)
BUN SERPL-MCNC: 13 MG/DL (ref 7–30)
CALCIUM SERPL-MCNC: 9 MG/DL (ref 8.5–10.1)
CHLORIDE BLD-SCNC: 112 MMOL/L (ref 94–109)
CO2 SERPL-SCNC: 24 MMOL/L (ref 20–32)
CREAT SERPL-MCNC: 1 MG/DL (ref 0.52–1.04)
GFR SERPL CREATININE-BSD FRML MDRD: 59 ML/MIN/1.73M2
GLUCOSE BLD-MCNC: 99 MG/DL (ref 70–99)
GLUCOSE BLDC GLUCOMTR-MCNC: 132 MG/DL (ref 70–99)
HGB BLD-MCNC: 13.1 G/DL (ref 11.7–15.7)
POTASSIUM BLD-SCNC: 4.1 MMOL/L (ref 3.4–5.3)
SODIUM SERPL-SCNC: 139 MMOL/L (ref 133–144)

## 2022-04-29 PROCEDURE — 250N000013 HC RX MED GY IP 250 OP 250 PS 637: Performed by: NURSE PRACTITIONER

## 2022-04-29 PROCEDURE — 36415 COLL VENOUS BLD VENIPUNCTURE: CPT | Performed by: COLON & RECTAL SURGERY

## 2022-04-29 PROCEDURE — 250N000013 HC RX MED GY IP 250 OP 250 PS 637: Performed by: COLON & RECTAL SURGERY

## 2022-04-29 PROCEDURE — 250N000011 HC RX IP 250 OP 636: Performed by: COLON & RECTAL SURGERY

## 2022-04-29 PROCEDURE — 85018 HEMOGLOBIN: CPT | Performed by: COLON & RECTAL SURGERY

## 2022-04-29 PROCEDURE — 258N000003 HC RX IP 258 OP 636: Performed by: COLON & RECTAL SURGERY

## 2022-04-29 PROCEDURE — 99222 1ST HOSP IP/OBS MODERATE 55: CPT | Mod: AI | Performed by: NURSE PRACTITIONER

## 2022-04-29 PROCEDURE — 80048 BASIC METABOLIC PNL TOTAL CA: CPT | Performed by: COLON & RECTAL SURGERY

## 2022-04-29 PROCEDURE — 120N000001 HC R&B MED SURG/OB

## 2022-04-29 RX ORDER — IRBESARTAN 150 MG/1
300 TABLET ORAL DAILY
Status: DISCONTINUED | OUTPATIENT
Start: 2022-04-29 | End: 2022-04-29

## 2022-04-29 RX ORDER — TORSEMIDE 20 MG/1
20 TABLET ORAL 2 TIMES DAILY
Status: DISCONTINUED | OUTPATIENT
Start: 2022-04-29 | End: 2022-05-13

## 2022-04-29 RX ORDER — SPIRONOLACTONE 25 MG/1
25 TABLET ORAL DAILY
Status: DISCONTINUED | OUTPATIENT
Start: 2022-04-29 | End: 2022-05-17 | Stop reason: HOSPADM

## 2022-04-29 RX ORDER — CARVEDILOL 12.5 MG/1
12.5 TABLET ORAL 2 TIMES DAILY WITH MEALS
Status: DISCONTINUED | OUTPATIENT
Start: 2022-04-29 | End: 2022-05-17 | Stop reason: HOSPADM

## 2022-04-29 RX ORDER — IRBESARTAN 150 MG/1
300 TABLET ORAL AT BEDTIME
Status: DISCONTINUED | OUTPATIENT
Start: 2022-04-29 | End: 2022-05-13

## 2022-04-29 RX ORDER — AMLODIPINE BESYLATE 5 MG/1
5 TABLET ORAL DAILY
Status: DISCONTINUED | OUTPATIENT
Start: 2022-04-29 | End: 2022-05-12

## 2022-04-29 RX ORDER — ALBUTEROL SULFATE 90 UG/1
2 AEROSOL, METERED RESPIRATORY (INHALATION) 4 TIMES DAILY PRN
Status: DISCONTINUED | OUTPATIENT
Start: 2022-04-29 | End: 2022-05-17 | Stop reason: HOSPADM

## 2022-04-29 RX ORDER — SIMETHICONE 80 MG
80 TABLET,CHEWABLE ORAL EVERY 6 HOURS PRN
Status: DISCONTINUED | OUTPATIENT
Start: 2022-04-29 | End: 2022-05-17 | Stop reason: HOSPADM

## 2022-04-29 RX ORDER — AMIODARONE HYDROCHLORIDE 200 MG/1
200 TABLET ORAL DAILY
Status: DISCONTINUED | OUTPATIENT
Start: 2022-04-29 | End: 2022-05-12

## 2022-04-29 RX ORDER — ATORVASTATIN CALCIUM 40 MG/1
80 TABLET, FILM COATED ORAL AT BEDTIME
Status: DISCONTINUED | OUTPATIENT
Start: 2022-04-29 | End: 2022-05-17 | Stop reason: HOSPADM

## 2022-04-29 RX ADMIN — AMIODARONE HYDROCHLORIDE 200 MG: 200 TABLET ORAL at 12:37

## 2022-04-29 RX ADMIN — ACETAMINOPHEN 975 MG: 325 TABLET ORAL at 06:34

## 2022-04-29 RX ADMIN — SODIUM CHLORIDE, POTASSIUM CHLORIDE, SODIUM LACTATE AND CALCIUM CHLORIDE: 600; 310; 30; 20 INJECTION, SOLUTION INTRAVENOUS at 00:33

## 2022-04-29 RX ADMIN — SODIUM CHLORIDE, POTASSIUM CHLORIDE, SODIUM LACTATE AND CALCIUM CHLORIDE: 600; 310; 30; 20 INJECTION, SOLUTION INTRAVENOUS at 18:07

## 2022-04-29 RX ADMIN — UMECLIDINIUM 1 PUFF: 62.5 AEROSOL, POWDER ORAL at 12:38

## 2022-04-29 RX ADMIN — OXYCODONE HYDROCHLORIDE 2.5 MG: 5 TABLET ORAL at 11:44

## 2022-04-29 RX ADMIN — CARVEDILOL 12.5 MG: 12.5 TABLET, FILM COATED ORAL at 12:37

## 2022-04-29 RX ADMIN — ACETAMINOPHEN 975 MG: 325 TABLET ORAL at 00:42

## 2022-04-29 RX ADMIN — IRBESARTAN 300 MG: 150 TABLET ORAL at 22:10

## 2022-04-29 RX ADMIN — LEVOTHYROXINE SODIUM 25 MCG: 25 TABLET ORAL at 06:34

## 2022-04-29 RX ADMIN — AMLODIPINE BESYLATE 5 MG: 5 TABLET ORAL at 12:37

## 2022-04-29 RX ADMIN — ACETAMINOPHEN 975 MG: 325 TABLET ORAL at 22:10

## 2022-04-29 RX ADMIN — ENOXAPARIN SODIUM 40 MG: 40 INJECTION SUBCUTANEOUS at 16:28

## 2022-04-29 RX ADMIN — ACETAMINOPHEN 975 MG: 325 TABLET ORAL at 16:39

## 2022-04-29 RX ADMIN — OXYCODONE HYDROCHLORIDE 5 MG: 5 TABLET ORAL at 16:26

## 2022-04-29 RX ADMIN — ATORVASTATIN CALCIUM 80 MG: 40 TABLET, FILM COATED ORAL at 22:10

## 2022-04-29 RX ADMIN — ESCITALOPRAM OXALATE 10 MG: 10 TABLET ORAL at 08:59

## 2022-04-29 RX ADMIN — CARVEDILOL 12.5 MG: 12.5 TABLET, FILM COATED ORAL at 18:07

## 2022-04-29 ASSESSMENT — ACTIVITIES OF DAILY LIVING (ADL)
ADLS_ACUITY_SCORE: 3
ADLS_ACUITY_SCORE: 8
ADLS_ACUITY_SCORE: 8
ADLS_ACUITY_SCORE: 3
ADLS_ACUITY_SCORE: 3
ADLS_ACUITY_SCORE: 8
ADLS_ACUITY_SCORE: 3
ADLS_ACUITY_SCORE: 4
ADLS_ACUITY_SCORE: 8
ADLS_ACUITY_SCORE: 3
ADLS_ACUITY_SCORE: 8
ADLS_ACUITY_SCORE: 4
ADLS_ACUITY_SCORE: 3
ADLS_ACUITY_SCORE: 3
ADLS_ACUITY_SCORE: 4
ADLS_ACUITY_SCORE: 8
ADLS_ACUITY_SCORE: 3
ADLS_ACUITY_SCORE: 8

## 2022-04-29 NOTE — CONSULTS
"[facility] Nurse Inpatient Ostomy Assessment     Assessment of new loop Ileostomy     Surgery date:  4/28/22  Surgeon:  Dr. Lay Connolly  Procedure:  Low anterior resection with diverting loop ileostomy  Related diagnosis:  Low rectal cancer    WO Assessment & Physical Exam:        Current status: Alert, stream of conscious talking. Emotional at times. Patient is feeling forgetful      Date of last photo: 4/29/22          Stoma location: RUQ    Stoma size: 1 1/8\"    Stoma appearance: pink-red, round and edematous    Mucocutaneous junction:  intact    Peristomal complication(s): none , bruising    Abdominal assessment: distended    Surgical site(s): open to air    NG still in place? No    Output: serosanguinous     Pain: Cramping    Is patient still on a PCA? No      Current pouching system: Geena 1 piece    Pouch last changed:  4/28 surgical dressing    Reason for pouch change today: ostomy education      Learning and comprehension: Patient overwhelmed today and emotional. Patient reports feeling forgetful and does repeat herself at times. Reviewed pouch change, frequency of pouch change, how to empty.       Psychosocial: Patient is overwhelmed post op. She is hoping this will resolve over the weekend.      WOC Plan:         Pouching product plan: Geena 2 piece 57mm flat with ring and lock and roll    Comments: monitor for abdominal changes that may require changes to pouching    Frequency of pouch changes: 2-3x weekly      Pt support system on discharge: patient reports speaking to her son on the phone. But not discussed today    WOC recommend home care?  yes      WOC follow-up plan: daily M-F      Objective Data:       Patient history according to medical record: Janet Figueroa is a 71 year old female admitted on 4/28/2022. She has a past medical history of hypertension, mild COPD, CAD s/p PCI, dyslipidemia, paroxysmal A. Fib on eliquis, CKD, stage IIIb, renal artery " stenosis, hypothyroidism, depression/anxiety, prediabetes, GERD, renal artery stenosis, who is status post low anterior resection with diverting loop ileostomy due to rectal cancer.      Current Diet/Nutrition:   Orders Placed This Encounter      Full Liquid Diet       Output:  I/O last 3 completed shifts:  In: 2400 [I.V.:2400]  Out: 960 [Urine:700; Drains:230; Blood:30]      Labs:   Recent Labs   Lab 04/29/22  0635 04/28/22  1120   HGB 13.1 14.1   WBC  --  5.5         Star Risk Assessment:   Sensory Perception: 4-->no impairment  Moisture: 3-->occasionally moist  Activity: 3-->walks occasionally  Mobility: 3-->slightly limited  Nutrition: 2-->probably inadequate  Friction and Shear: 3-->no apparent problem  Star Score: 18      WOC Interventions:       Patient's chart evaluated    Focus of today's visit: initial fitting, pouch change demonstration , verbal instruction , pouch emptying, output measurement and discharge instructions     Pouch changed 4/29    Participant(s) in teaching session today: patient  and nurse    Change made with ostomy management today: Yes    Supplies: Gathered and at bedside    Educational materials: lakisha folder at bedside. Explained to patient, but did not review today in order to avoid overwhelming    Preparation for discharge: No discharge preparations started    Registered for supply samples? TBD    Discussed plan of care with: Patient and Nurse    Jeane Roach, RN, CWOCN

## 2022-04-29 NOTE — BRIEF OP NOTE
[facility]    Brief Operative Note    Pre-operative diagnosis: Rectal cancer (H) [C20]  Post-operative diagnosis low rectal cancer    Procedure: Procedure(s):  ROBOTIC ASSISTED LOW ANTERIOR RESECTION WITH DIVERTING LOOP ILEOSTOMY  Surgeon: Surgeon(s) and Role:     * Lay Connolly MD - Primary     * Dora Taveras MD - Assisting     * Jayce Bundy PA-C - Assisting  Anesthesia: General   Estimated Blood Loss: 30 mL    Drains: None  Specimens:   ID Type Source Tests Collected by Time Destination   1 : Sigmoid Colon and Rectum with Anastomatic Ring (Distal Free/Proximal on Anvil) Tissue Large Intestine, Colon, Sigmoid/Rectal SURGICAL PATHOLOGY EXAM Lay Connolly MD 4/28/2022  7:52 PM      Findings:   Specimen with tattoo and lesion.  Complications: None.  Implants: * No implants in log *      ADDENDUM:    PATIENT DATA  Indicate Y or N:  Home O2 No  Hemodialysis No  Transplant patient No  Cirrhosis No  Steroids in last 30 days No  Immunomodulators in last 30 days No  Anticoagulation at time of surgery No   List medication n/a  Prior abdominal surgery No  Pelvic irradiation No    Albumin within 30 days if known n/a  Hgb within 30 days if known 14.1  Cr within 30 days if known 1.18  Body mass index is 34.9 kg/m .    OR DATA  Emergent No   <24 hours No   <1 week No  Bowel Prep (Y or N) Yes  Antibiotics (Y or N) Yes  DVT prophylaxis    Heparin Yes   SCD Yes   None No  Drain Yes  ASA (1,2,3,4,5 if unknown) 3  OR time (min) 463  Stents No  Transfuse >/= 2U No  Anastomosis   Stapled Yes   Handsewn No  Leak Test (pos, neg, not done) NEGATIVE    FOR CANCER ALSO COMPLETE:  Preoperative treatment (Y or N)   Chemo No   Radiation No   Diversion No  If rectal cancer   Distance from anal verge (cm) 6   Location    Anterior No    Posterior Yes    Lateral Yes    Circumferential No  Preoperative Stage   U/S Yes   MRI Yes   Clinical Yes   Unknown Yes   T stage (1,2,3,4,5 if unknown)  5   N stage (0,1,2,3 if unknown) 3   M stage (0,1) 0  CEA n/a  Metastatic disease at time of operation (Y or N) No

## 2022-04-29 NOTE — ANESTHESIA POSTPROCEDURE EVALUATION
Patient: Janet Figueroa    Procedure: Procedure(s):  ROBOTIC ASSISTED LOW ANTERIOR RESECTION WITH DIVERTING LOOP ILEOSTOMY       Anesthesia Type:  General    Note:     Postop Pain Control: Uneventful            Sign Out: Well controlled pain   PONV: No   Neuro/Psych: Uneventful            Sign Out: Acceptable/Baseline neuro status   Airway/Respiratory: Uneventful            Sign Out: Acceptable/Baseline resp. status   CV/Hemodynamics: Uneventful            Sign Out: Acceptable CV status   Other NRE: NONE   DID A NON-ROUTINE EVENT OCCUR? No           Last vitals:  Vitals Value Taken Time   /73 04/28/22 2215   Temp 36.6  C (97.8  F) 04/28/22 2130   Pulse 61 04/28/22 2223   Resp 9 04/28/22 2223   SpO2 97 % 04/28/22 2223   Vitals shown include unvalidated device data.    Electronically Signed By: Eugenio Blunt MD  April 28, 2022  10:24 PM

## 2022-04-29 NOTE — CONSULTS
NUTRITION EDUCATION    REASON FOR ASSESSMENT:  Nutrition education on Low Residue Diet    NUTRITION HISTORY:  Information obtained from patient  She is having a difficult time remembering things in her current post-op state so provides little hx  Follows a regular diet at home, skips breakfast and consumes only lunch and dinner   Meals vary but may typically include chicken, fish, whole grain breads, homemade soups, spaghetti, mac and cheese  No known food allergies, but patient notes she often has difficultly digesting ice cream   Uses Mrs Grullon herb seasoning in place of salt     CURRENT DIET ORDER:  Full liquid     NUTRITION DIAGNOSIS:   Food- and nutrition-related knowledge deficit R/t no prior exposure to information as evidenced by patient report and recent abdominal procedure     INTERVENTIONS:  Nutrition Prescription:    Recommended Low Residue diet per Colorectal Surgery      Implementation:    Nutrition Education (Content):  o Reviewed diet guidelines  o Provided diet handouts:  - Low Fiber Nutrition Therapy  - Fiber Content in Common Foods    Nutrition Education (Application):  o Discussed current eating habits and recommended alternative food choice    Anticipate good compliance    Diet Education  -  refer to Education Flowsheet    Goals:    Patient verbalizes understanding of diet    All of the above goals are met during diet education session    Follow Up/Monitoring:    Provide RD contact information for future questions.    MD to provide information on duration of diet once patient discharged     Patient will receive further teaching on diet from [facility] with new ostomy      Tiffanie Richardson RD, LD  Clinical Dietitian   [facility], Ortho, Ortho spine  Pager: [phone]

## 2022-04-29 NOTE — PROGRESS NOTES
"Colon & Rectal Surgery Progress Note             Interval History:   Postop Day #: 1 Robotic assisted LAR with DI for low rectal cancer  Doing well. Up in the chair, no nausea. Pain controlled. HENNY serosang 230cc. Good UOP                Medications:   I have reviewed this patient's current medications               Physical Exam:   Blood pressure (!) 143/65, pulse 61, temperature 97.7  F (36.5  C), temperature source Oral, resp. rate 16, height 1.6 m (5' 3\"), weight 89.4 kg (197 lb), SpO2 98 %.    Intake/Output Summary (Last 24 hours) at 4/29/2022 0823  Last data filed at 4/29/2022 0600  Gross per 24 hour   Intake 2400 ml   Output 960 ml   Net 1440 ml     GEN:  alert  ABD:  Soft, incisions CDI, stoma pink and productive         Data:        Lab Results   Component Value Date     04/29/2022    Lab Results   Component Value Date    CHLORIDE 112 04/29/2022    Lab Results   Component Value Date    BUN 13 04/29/2022      Lab Results   Component Value Date    POTASSIUM 4.1 04/29/2022    Lab Results   Component Value Date    CO2 24 04/29/2022    Lab Results   Component Value Date    CR 1.00 04/29/2022    CR 1.18 04/28/2022        Lab Results   Component Value Date    HGB 13.1 04/29/2022    HGB 14.1 04/28/2022     Lab Results   Component Value Date     04/28/2022     Lab Results   Component Value Date    WBC 5.5 04/28/2022            Assessment and Plan:   Doing well  Stoma care and teaching.  Lovenox now and at discharge  Continue hooker until POD #3  Full liquid diet.  Encourage ambulation  Multimodal pain management.       Lay Connolly MD  [facility]  Office Phone # [phone]    "

## 2022-04-29 NOTE — SIGNIFICANT EVENT
Significant Event Note    received an asap consult from Dr Connolly.    Call back number provided in consult [phone] goes to an answering service which informed me that they did not have Dr Connolly listed as one of their providers. I contacted the patient's nurse and she informed me that there are no acute medical issues that need to be addressed overnight. Patient has been added to the consult list and will be seen in the morning.     Anival Bosch MD

## 2022-04-29 NOTE — CONSULTS
[facility]  Consult Note - Hospitalist Service  Date of Admission:  4/28/2022  Consult Requested by: Dr. Connolly  Reason for Consult: Medical Management     Assessment & Plan   Janet Figueroa is a 71 year old female admitted on 4/28/2022. She has a past medical history of hypertension, mild COPD, CAD s/p PCI, dyslipidemia, paroxysmal A. Fib on eliquis, CKD, stage IIIb, renal artery stenosis, hypothyroidism, depression/anxiety, prediabetes, GERD, renal artery stenosis, who is status post low anterior resection with diverting loop ileostomy due to rectal cancer.    Rectal cancer s/p low anterior resection with diverting loop ileostomy, POD #1  Surgeon Dr. Lay Connolly, surgery completed under general anesthesia, EBL 30 cc, no complications noted.   -  Feels notable distension, diaphragmatic pain radiating into left shoulder/scapula-->will add simethicone.  - Defer surgical management to primary service    CAD s/p PCI RCA, 11/2019 and LCx 07/2008  Chronic diastolic dysfunction  [PTA spironolactone, torsemide]  Follows with cardiology in the outpatient setting, most recent coronary angiogram 01/2020 showed a 50% mid LAD lesion, 50% stenosis in the 1st obtuse marginal branch, no evidence for recurrent occlusive coronary artery disease.  Most recent TTE 11/2019 shows mild left atrial enlargement, LV EF normal at 55, no valve pathology, mild mitral and trace tricuspid regurg were noted.  Since admission, the patient is net +1.4 L, urine output adequate, she is currently on a clear liquid diet, tolerating well, likely will advance today.   - if breakfast/diet advancement goes well, will stop IVF  - She was evaluated prior to surgery and was believed to be optimized, no changes were made.   - Hold spironolactone, torsemide for now    Hypertension  Dyslipidemia  BP has been running on elevated side, 140s/70s.  Discussed with cardiology preoperatively, no changes were made but if hypertension  "persists, recommendations were to increase Coreg to 12.5 in the morning, and 25 mg in the evening daily.  Postoperatively, BP is near baseline at 140s/60s, heart rate 61.  -PTA amlodipine, Coreg, irbesartan restarted with parameters  - Continue atorvastatin    Paroxysmal atrial fibrillation s/p cardioversions, 2019, 2020   Pre op ECG shows NSR w/ 1st deg AV block, QTc upper limits of normal at 463. CHADS2-VASC score of 5 for age, female gender, hypertension, coronary artery disease and congestive heart failure history, she is on Eliquis, which was stopped 48 hours prior to surgery.  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  - Avoid nephrotoxic meds, recheck BMP tomorrow a.m.    Prediabetes  Fasting glucose this a.m. 99  - monitor for hypoglycemia    Hypothyroidism  - Continue levothyroxine    Depression  Anxiety  - Continue Lexapro    COPD, Mild  Not on home O2, has been clinically stable.  - Continue albuterol every 4 as needed and Incruse Ellipta 1 puff daily       The patient's care was discussed with the Attending Physician, Dr. Varags, Bedside Nurse and Patient.    ROBERT Slaughter [facility]  Securely message with the Vocera Web Console (learn more here)  Text page via [facility] Paging/[facility]       Hospitalist Service    Clinically Significant Risk Factors Present on Admission               # Coagulation Defect: home medication list includes an anticoagulant medication    # Obesity: Estimated body mass index is 34.9 kg/m  as calculated from the following:    Height as of this encounter: 1.6 m (5' 3\").    Weight as of this encounter: 89.4 kg (197 lb).      ______________________________________________________________________    Chief Complaint   Elective lower anterior resection " with possible ileostomy for rectal cancer    History is obtained from the patient    History of Present Illness   Janet Figueroa is a 71 year old female who has a past medical history of hypertension, mild COPD, CAD s/p PCI, dyslipidemia, paroxysmal A. Fib on eliquis, CKD, stage IIIb, renal artery stenosis, hypothyroidism, depression/anxiety, prediabetes, GERD, renal artery stenosis, who is status post low anterior resection with diverting loop ileostomy due to rectal cancer.    Patient noted blood in her stool and rectal bleeding, was evaluated by GI and underwent colonoscopy that showed differentiated rectal adenocarcinoma.  No surgical complications, completed by Dr. Connolly, EBL 30cc.     I evaluated the patient in the morning of postop day #1, she feels anxious and foggy but pain is mostly tolerable. She feels notable distension in her abdomen, with diaphragmatic pain referred to her left shoulder/across scapula. She denies any chest pain, dyspnea, nausea/vomiting.    Review of Systems   The 10 point Review of Systems is negative other than noted in the HPI or here.     Past Medical History    I have reviewed this patient's medical history and updated it with pertinent information if needed.   Past Medical History:   Diagnosis Date     Arrhythmia      COPD (chronic obstructive pulmonary disease) (H)      Coronary artery disease      Hypertension      Rectal cancer (H)      Renal disease        Past Surgical History   I have reviewed this patient's surgical history and updated it with pertinent information if needed.  History reviewed. No pertinent surgical history.    Social History   I have reviewed this patient's social history and updated it with pertinent information if needed.  Social History     Tobacco Use     Smoking status: Former Smoker     Smokeless tobacco: Never Used   Substance Use Topics     Alcohol use: Not Currently       Family History   I have reviewed this patient's family history and  updated it with pertinent information if needed.  Family History   Problem Relation Age of Onset     Coronary Artery Disease Mother      Lung Cancer Mother      Coronary Artery Disease Father      Kidney failure Father      Hypertension Sister      Liver Cancer Sister      Hypertension Brother        Medications   I have reviewed this patient's current medications    Allergies   Allergies   Allergen Reactions     Ciprofloxacin Hives     Alendronic Acid Other (See Comments)     HUT Comment: severe esophageal reflux, jaw pain, arm pain  severe esophageal reflux, jaw pain, arm pain       Clopidogrel Other (See Comments)     Increased bruising and bloody nose  Increased bruising and bloody nose       Codeine Other (See Comments) and Itching     Digoxin Nausea and Vomiting     Lisinopril Cough and Other (See Comments)     HUT Reaction: Cough       Risedronate      HUT Comment: severe esophageal reflux, jaw and arm pain  severe esophageal reflux, jaw and arm pain       Adhesive Tape Rash     And telemetry adhesive     Cephalexin Rash     Chromium Rash       Physical Exam   Vital Signs: Temp: 98.2  F (36.8  C) Temp src: Oral BP: (!) 140/56 Pulse: 69   Resp: 14 SpO2: 98 % O2 Device: None (Room air) Oxygen Delivery: 2 LPM  Weight: 197 lbs 0 oz    Constitutional: awake, alert, cooperative, no apparent distress, and appears stated age  Eyes: pupils equal, round and reactive to light and extra-ocular muscles intact  Respiratory: No increased work of breathing, good air exchange, clear to auscultation bilaterally, no crackles or wheezing  Cardiovascular: regular rate and rhythm, no murmur noted and trace edema  GI: distended, firm, diffusely tender. ileostomy in place, stoma red/round, brown liquid drainage. HENNY in place with sanguinous drainage.   Skin: pale, warm/dry  Musculoskeletal: purposeful movement, extremities strong/equal  Neuropsychiatric: General: normal and normal eye contact  Level of consciousness: alert /  normal  Affect: anxious    Data   Results for orders placed or performed during the hospital encounter of 04/28/22 (from the past 24 hour(s))   CBC with platelets   Result Value Ref Range    WBC Count 5.5 4.0 - 11.0 10e3/uL    RBC Count 4.13 3.80 - 5.20 10e6/uL    Hemoglobin 14.1 11.7 - 15.7 g/dL    Hematocrit 42.8 35.0 - 47.0 %     (H) 78 - 100 fL    MCH 34.1 (H) 26.5 - 33.0 pg    MCHC 32.9 31.5 - 36.5 g/dL    RDW 13.2 10.0 - 15.0 %    Platelet Count 319 150 - 450 10e3/uL   ABO/Rh type and screen    Narrative    The following orders were created for panel order ABO/Rh type and screen.  Procedure                               Abnormality         Status                     ---------                               -----------         ------                     Adult Type and Screen[[id number]]                            Final result                 Please view results for these tests on the individual orders.   Basic metabolic panel   Result Value Ref Range    Sodium 138 133 - 144 mmol/L    Potassium 4.1 3.4 - 5.3 mmol/L    Chloride 110 (H) 94 - 109 mmol/L    Carbon Dioxide (CO2) 25 20 - 32 mmol/L    Anion Gap 3 3 - 14 mmol/L    Urea Nitrogen 18 7 - 30 mg/dL    Creatinine 1.18 (H) 0.52 - 1.04 mg/dL    Calcium 9.8 8.5 - 10.1 mg/dL    Glucose 108 (H) 70 - 99 mg/dL    GFR Estimate 49 (L) >60 mL/min/1.73m2   CEA   Result Value Ref Range    CEA 1.0 0.0 - 2.5 ug/L    Narrative    Assay Method:  Chemiluminescence using Siemens Centaur  XP   Adult Type and Screen   Result Value Ref Range    ABO/RH(D) A POS     Antibody Screen Negative Negative    SPECIMEN EXPIRATION DATE [id number]    Glucose by meter   Result Value Ref Range    GLUCOSE BY METER POCT 132 (H) 70 - 99 mg/dL   Basic metabolic panel   Result Value Ref Range    Sodium 139 133 - 144 mmol/L    Potassium 4.1 3.4 - 5.3 mmol/L    Chloride 112 (H) 94 - 109 mmol/L    Carbon Dioxide (CO2) 24 20 - 32 mmol/L    Anion Gap 3 3 - 14 mmol/L    Urea Nitrogen 13 7 - 30  mg/dL    Creatinine 1.00 0.52 - 1.04 mg/dL    Calcium 9.0 8.5 - 10.1 mg/dL    Glucose 99 70 - 99 mg/dL    GFR Estimate 59 (L) >60 mL/min/1.73m2   Hemoglobin   Result Value Ref Range    Hemoglobin 13.1 11.7 - 15.7 g/dL

## 2022-04-29 NOTE — PLAN OF CARE
Patient vital signs are at baseline: Yes  Patient able to ambulate as they were prior to admission or with assist devices provided by therapies during their stay:  No,  Reason:  Due to walk   Patient MUST void prior to discharge:  No,  Reason:  Bladder cath Gottlieb    Patient able to tolerate oral intake:  No,  Reason:  Clear liquid diet   Pain has adequate pain control using Oral analgesics:  Yes  Does patient have an identified :  No,  Reason:  Patient family to get in touch  Has goal D/C date and time been discussed with patient:  No,  Reason:  Not just determined

## 2022-04-29 NOTE — PLAN OF CARE
Goal Outcome Evaluation:  Alert and oriented X4, vss on ra.  Pt has two Periferal iv on lower R and L hand.  LR 50ml infusing on L arm, jesus drain on let stomach.

## 2022-04-29 NOTE — DISCHARGE INSTRUCTIONS
New Ileostomy     Surgery date:  4/28/22  Surgeon:  Dr. Lay Connolly  Procedure:  Low anterior resection with diverting loop ileostomy  Related diagnosis:  Low rectal cancer    General:   1. Change appliance 2-3x/week and as needed for any leakage.    2. Empty pouch when 1/3 full. Initially wake to check need for pouch emtpying at least once during the night.    3. Carry an extra pouching system with you at all times (can prepare the barrier ahead of time - cut out the opening and apply ring).  4. Diet: Eat 6 small meals/day.  Drink 8oz/fluid every hour during waking hours. Chew all food well!    5. Diet: Eat pasta, rice, potatoes, hot/dry cereal, toast, breads, pretzels, chips, crackers, yogurt, meat, fish, eggs, cheese, peanut butter, bananas to thicken the stool. Eat 4-6 servings of protein/day.  6. Avoid eating: Seeds, nuts, peels, raw vegetables, chinese vegetables, casings on meats, grape/prune juice, grapefruit, oranges, pineapple, mushrooms.   7. NO laxatives. NO timed-released medications.   8. May shower with appliance on or off. Blow dry (on cool setting) cloth backing and tape following bathing.   9. Change your appliance in the morning before breakfast (less likely to have stool coming out at this time).  10. Walk frequently but no heavy lifting.  11. Can use an odor eliminator (Ex: Febreeze) in the room prior to emptying or changing appliance.  12. Initially monitor your output to avoid dehydration.  Call Physician if your output exceeds 1500cc/24hrs.    Supplies:  (Ask your home care nurse to help you order supplies.  Your product plan may change over time, as your stoma and/or your abdomen changes).    -Pt has supplies for discharge   -[facility] to reinforce appliance change with client.     Geena New Image 2-pc (20 pouch changes/month)   1.  Barrier: New Image Flextend convex  cut-to-fit with tape    -   #44427 (5/box = 4box/month)   2.  Pouch: high output   -                                             "                  #61571  (10/box = 2 box/month)  and/or  3.  Pouch: drainable lock n roll    -                                                #95927 (clear pouch) or  #61456 (beige pouch)  (10/box = 2 box/month)  4.  Ring:  [place] 2\" CeraRing   -                                                #8805  (10/box = 2 box/month)  5.  3M Cavilon no-sting skin barrier (optional if needed)   -         #3344  (50/box = 1 box as needed)  6.  Stoma powder (if needed)   -                                                  #7906  (1 bottle as needed)  7.  Ostomy belt (optional)   -                                                       #7299  (large) -  1/month  8.  Odor eliminator drops (optional)   -   #7717  (8oz bottle) or #7715 (1oz bottle, box of 12)  9. Belly Band @ [employer]     Pouching procedure:  1.  Cut out opening in barrier following pattern.   Just inside ! 1/2\" line  2.  Remove plastic backing.  3.  Open ring, stretch and apply around the cut opening on sticky side of barrier.  Press into place.  Note:  can 'build up' the ring to fill in any divots in peristomal skin.     4.  Fold back edge of paper that covers the tape to create a \"tab.\"  This assists with paper removal later on.  5.  May attach pouch to barrier at this time.  Set prepared pouch aside.  6.  Remove old pouch from around stoma.  Discard.   7.  Cleanse around stoma with water only.  Dry well.     8.  Apply pouch:  Ensure skin completely dry.  Pull up on abdomen to create flat pouching surface.  Center stoma in barrier opening and press barrier firmly to skin.  9.  Pull on \"tabs\" to remove paper that covers the tape.  Press tape to skin.  Ok if there are wrinkles in the tape.    10.  Attach pouch to barrier, if have not already done.  Ensure pouch is closed.   11.  Hold warm hand over stoma/barrier for a few minutes, to help pouch form a good secure bond with the skin.      Online ostomy resources: [employer] website, has good videos - " https://www.[employer].com/en/ostomycare/educationaltools   - See also [email or web address].[email or web address]; [email or web address].org  - Smart phone janet: Ostobuddy      Follow-up as needed (after home care is done):     Dionne De Guzman Amanda, Kaitlin - CWOCNs (ostomy nurses)  Clinic room is on 1st floor in [facility] - check in at [place] Desk in [facility] phone # [phone] (Mon - Fri) - call for any questions or concerns

## 2022-04-29 NOTE — ANESTHESIA CARE TRANSFER NOTE
Patient: Janet Figueroa    Procedure: Procedure(s):  ROBOTIC ASSISTED LOW ANTERIOR RESECTION WITH DIVERTING LOOP ILEOSTOMY       Diagnosis: Rectal cancer (H) [C20]  Diagnosis Additional Information: No value filed.    Anesthesia Type:   General     Note:    Oropharynx: oropharynx clear of all foreign objects and spontaneously breathing  Level of Consciousness: awake  Oxygen Supplementation: face mask  Level of Supplemental Oxygen (L/min / FiO2): 6  Independent Airway: airway patency satisfactory and stable  Dentition: dentition unchanged  Vital Signs Stable: post-procedure vital signs reviewed and stable  Report to RN Given: handoff report given  Patient transferred to: PACU    Handoff Report: Identifed the Patient, Identified the Reponsible Provider, Reviewed the pertinent medical history, Discussed the surgical course, Reviewed Intra-OP anesthesia mangement and issues during anesthesia, Set expectations for post-procedure period and Allowed opportunity for questions and acknowledgement of understanding      Vitals:  Vitals Value Taken Time   /78 04/28/22 2131   Temp     Pulse 68 04/28/22 2133   Resp 17 04/28/22 2133   SpO2 100 % 04/28/22 2133   Vitals shown include unvalidated device data.    Electronically Signed By: ROBERT Oliver CRNA  April 28, 2022  9:35 PM

## 2022-04-29 NOTE — OP NOTE
OPERATIVE REPORT     PREOPERATIVE DIAGNOSIS:  Rectal cancer      POSTOPERATIVE DIAGNOSIS:  Same     OPERATIONS PERFORMED:  1.  Robotic assisted low anterior resection with colorectal anastomosis  2.  Laparoscopic mobilization of the splenic flexure  3.  Rigid proctoscopy with anastomotic leak testing  4.  Diverting loop ileostomy   5.  Intra-operative fluorescence angiography      SURGEON:  Lay Connolly MD     COSURGEON: Dora Taveras MD      ASSISTANT: TAISHA Castanon MD, Colon and Rectal Surgery Fellow      ANESTHESIA:  General.     INDICATIONS FOR PROCEDURE:  Janet Figueroa is a 71 year old woman who underwent colonoscopic evaluation for rectal bleeding and was found to have a 2 cm rectal polyp which was removed in a piecemeal fashion.  Unfortunately pathology was consistent with a rectal adenocarcinoma.  Repeat biopsies of the residual*in the colon also demonstrated residual adenocarcinoma.  She saw Dr. Lay Connolly in consultation and discussed the risks and benefits of local excision versus radical resection.  She has agreed to proceed with a robotic assisted low anterior resection with diverting loop ileostomy.  The risks and benefits of this procedure have been discussed and informed consent was obtained.    OPERATIVE FINDINGS:   Scar from previous polypectomy with likely residual mid malignancy in the distal rectum in the left posterior position with a small tattoo.  No evidence of intra-abdominal spread of disease.  A size 28 EEA stapler was used to create the colo-rectal anastomosis.  The anastomosis was located at 4 cm from the anal verge and was hemostatic.  Insufflation from below did not reveal any evidence of leak.         PROCEDURE IN DETAIL:  See Dr. Connolly's operative report for full details of the surgery.  I bedside assisted for a portion of the case, assisted with the deep pelvic dissection and also robotic transection of the rectum distal to the mass and tattoo.  I  then performed the stapled anastomosis from below and rigid proctoscopy.  I went below and passed the 28 EEA stapler into the anal canal, advancing the spike through the staple line of the rectum.  The 2 ends of the stapler were connected, tightened and fired.  Two anastomotic doughnuts were completely intact and were sent to pathology.  The anastomosis was then tested by insufflating from below through the proctoscope with the anastomosis under saline.  There was no evidence of leak despite multiple insufflations.  Excess air was evacuated from below through the proctoscope, and the abdomen was irrigated with a copious amount of saline solution and sucked dry.  Hemostasis was evident. Dr. Connolly then completed the diverting loop ileostomy and the abdominal closure.    Dora Taveras MD

## 2022-04-29 NOTE — PLAN OF CARE
Goal Outcome Evaluation:  Dressing change complete on  jesus drain site. New ileostomy bag placed by WOC.  Pt aox4 but seemed confused at times and asked repeated questions. Pt voids via urinary hooker and ileostomy, up w/1 ast GBW. Pain interventions oxycodone. Compliant with full liquid diet.

## 2022-04-29 NOTE — CONSULTS
Received standard post-op orders for Low Residue diet education.  Chart reviewed, patient had low anterior resection with diverting loop ileostomy on 4/28/22.  Patient is currently on Full Liquids.   Diet education at needed by unit RD (CN following for diet teaching as well)    Gisele Dia RD, LD  Clinical Dietitian - [facility]   Pager - [phone]

## 2022-04-30 LAB
ALBUMIN UR-MCNC: NEGATIVE MG/DL
ANION GAP SERPL CALCULATED.3IONS-SCNC: 3 MMOL/L (ref 3–14)
APPEARANCE UR: CLEAR
BACTERIA #/AREA URNS HPF: ABNORMAL /HPF
BILIRUB UR QL STRIP: NEGATIVE
BUN SERPL-MCNC: 9 MG/DL (ref 7–30)
CALCIUM SERPL-MCNC: 8.6 MG/DL (ref 8.5–10.1)
CHLORIDE BLD-SCNC: 111 MMOL/L (ref 94–109)
CO2 SERPL-SCNC: 25 MMOL/L (ref 20–32)
COLOR UR AUTO: ABNORMAL
CREAT SERPL-MCNC: 0.9 MG/DL (ref 0.52–1.04)
GFR SERPL CREATININE-BSD FRML MDRD: 67 ML/MIN/1.73M2
GLUCOSE BLD-MCNC: 94 MG/DL (ref 70–99)
GLUCOSE BLDC GLUCOMTR-MCNC: 84 MG/DL (ref 70–99)
GLUCOSE UR STRIP-MCNC: NEGATIVE MG/DL
HGB BLD-MCNC: 11.9 G/DL (ref 11.7–15.7)
HGB UR QL STRIP: ABNORMAL
KETONES UR STRIP-MCNC: NEGATIVE MG/DL
LEUKOCYTE ESTERASE UR QL STRIP: ABNORMAL
NITRATE UR QL: NEGATIVE
PH UR STRIP: 5.5 [PH] (ref 5–7)
POTASSIUM BLD-SCNC: 3.8 MMOL/L (ref 3.4–5.3)
RBC URINE: 5 /HPF
SODIUM SERPL-SCNC: 139 MMOL/L (ref 133–144)
SP GR UR STRIP: 1.01 (ref 1–1.03)
UROBILINOGEN UR STRIP-MCNC: NORMAL MG/DL
WBC URINE: 29 /HPF

## 2022-04-30 PROCEDURE — 87086 URINE CULTURE/COLONY COUNT: CPT | Performed by: INTERNAL MEDICINE

## 2022-04-30 PROCEDURE — 250N000011 HC RX IP 250 OP 636: Performed by: COLON & RECTAL SURGERY

## 2022-04-30 PROCEDURE — 80048 BASIC METABOLIC PNL TOTAL CA: CPT | Performed by: NURSE PRACTITIONER

## 2022-04-30 PROCEDURE — 120N000001 HC R&B MED SURG/OB

## 2022-04-30 PROCEDURE — 250N000013 HC RX MED GY IP 250 OP 250 PS 637: Performed by: NURSE PRACTITIONER

## 2022-04-30 PROCEDURE — 81001 URINALYSIS AUTO W/SCOPE: CPT | Performed by: INTERNAL MEDICINE

## 2022-04-30 PROCEDURE — 250N000013 HC RX MED GY IP 250 OP 250 PS 637: Performed by: COLON & RECTAL SURGERY

## 2022-04-30 PROCEDURE — 258N000003 HC RX IP 258 OP 636: Performed by: COLON & RECTAL SURGERY

## 2022-04-30 PROCEDURE — 36415 COLL VENOUS BLD VENIPUNCTURE: CPT | Performed by: COLON & RECTAL SURGERY

## 2022-04-30 PROCEDURE — 85018 HEMOGLOBIN: CPT | Performed by: COLON & RECTAL SURGERY

## 2022-04-30 PROCEDURE — 99233 SBSQ HOSP IP/OBS HIGH 50: CPT | Performed by: INTERNAL MEDICINE

## 2022-04-30 RX ADMIN — ACETAMINOPHEN 975 MG: 325 TABLET ORAL at 07:04

## 2022-04-30 RX ADMIN — IRBESARTAN 300 MG: 150 TABLET ORAL at 21:37

## 2022-04-30 RX ADMIN — CARVEDILOL 12.5 MG: 12.5 TABLET, FILM COATED ORAL at 10:01

## 2022-04-30 RX ADMIN — AMLODIPINE BESYLATE 5 MG: 5 TABLET ORAL at 09:59

## 2022-04-30 RX ADMIN — SODIUM CHLORIDE, POTASSIUM CHLORIDE, SODIUM LACTATE AND CALCIUM CHLORIDE: 600; 310; 30; 20 INJECTION, SOLUTION INTRAVENOUS at 12:51

## 2022-04-30 RX ADMIN — HYDROMORPHONE HYDROCHLORIDE 0.2 MG: 0.2 INJECTION, SOLUTION INTRAMUSCULAR; INTRAVENOUS; SUBCUTANEOUS at 23:57

## 2022-04-30 RX ADMIN — ENOXAPARIN SODIUM 40 MG: 40 INJECTION SUBCUTANEOUS at 17:28

## 2022-04-30 RX ADMIN — AMIODARONE HYDROCHLORIDE 200 MG: 200 TABLET ORAL at 09:59

## 2022-04-30 RX ADMIN — OXYCODONE HYDROCHLORIDE 5 MG: 5 TABLET ORAL at 01:53

## 2022-04-30 RX ADMIN — SIMETHICONE 80 MG: 80 TABLET, CHEWABLE ORAL at 23:57

## 2022-04-30 RX ADMIN — OXYCODONE HYDROCHLORIDE 5 MG: 5 TABLET ORAL at 21:40

## 2022-04-30 RX ADMIN — ATORVASTATIN CALCIUM 80 MG: 40 TABLET, FILM COATED ORAL at 21:37

## 2022-04-30 RX ADMIN — CARVEDILOL 12.5 MG: 12.5 TABLET, FILM COATED ORAL at 17:28

## 2022-04-30 RX ADMIN — ACETAMINOPHEN 975 MG: 325 TABLET ORAL at 23:57

## 2022-04-30 RX ADMIN — OXYCODONE HYDROCHLORIDE 5 MG: 5 TABLET ORAL at 10:04

## 2022-04-30 RX ADMIN — LEVOTHYROXINE SODIUM 25 MCG: 25 TABLET ORAL at 07:05

## 2022-04-30 RX ADMIN — ACETAMINOPHEN 975 MG: 325 TABLET ORAL at 14:56

## 2022-04-30 RX ADMIN — UMECLIDINIUM 1 PUFF: 62.5 AEROSOL, POWDER ORAL at 10:01

## 2022-04-30 RX ADMIN — ESCITALOPRAM OXALATE 10 MG: 10 TABLET ORAL at 09:59

## 2022-04-30 ASSESSMENT — ACTIVITIES OF DAILY LIVING (ADL)
ADLS_ACUITY_SCORE: 8

## 2022-04-30 NOTE — PROGRESS NOTES
"Colon & Rectal Surgery Progress Note             Interval History:   Postop Day #: 2  Up in the chair. Feels confused. Good stoma output. UOP good.                Medications:   I have reviewed this patient's current medications               Physical Exam:   Blood pressure 132/58, pulse 58, temperature 99  F (37.2  C), temperature source Oral, resp. rate 18, height 1.6 m (5' 3\"), weight 89.4 kg (197 lb), SpO2 95 %.    Intake/Output Summary (Last 24 hours) at 4/30/2022 1038  Last data filed at 4/30/2022 0550  Gross per 24 hour   Intake 880 ml   Output 2590 ml   Net -1710 ml     GEN:  alert  ABD:  Soft, round stoma pink.          Data:        Lab Results   Component Value Date     04/30/2022    Lab Results   Component Value Date    CHLORIDE 111 04/30/2022    Lab Results   Component Value Date    BUN 9 04/30/2022      Lab Results   Component Value Date    POTASSIUM 3.8 04/30/2022    Lab Results   Component Value Date    CO2 25 04/30/2022    Lab Results   Component Value Date    CR 0.90 04/30/2022    CR 1.00 04/29/2022        Lab Results   Component Value Date    HGB 11.9 04/30/2022    HGB 13.1 04/29/2022     Lab Results   Component Value Date     04/28/2022     Lab Results   Component Value Date    WBC 5.5 04/28/2022            Assessment and Plan:   Doing well.  Encourage ambulation.  Low fiber diet.  Appreciate hospitalist.        Lay Connolly MD  [facility]  Office Phone # [phone]    " Robin Ramirez APRN Lanum, Emily  Caller: Unspecified (Today,  9:38 AM)  This was not sent to my results key.  We may inform her that she did not have a pulmonary embolism in which was the concern due to the positive D-dimer as well as her complaints.       Called pt and asked if she wanted me to speak w/ her granddaughter as she's not on release, pt verbalized that was fine. I advised her that if she wants us to speak w/ her in the future, to add her to her release next time she's in. I apologized to pt for confusion, explained what a D Dimer is, why imaging was done, and her normal results. She verbalized understanding.       Called pt's granddaughter, informed her that I got permission to speak w/ her this time and that I did suggest pt add her to release. Explained everything to her as well. She verbalized understanding.

## 2022-04-30 NOTE — PROVIDER NOTIFICATION
Page to Adolfo RIVER: 4233 C.D  Pt urine blood tinged, probably from hooker insertion yesterday but do you want to check UA?    Thank you  Carole PAIZ *[phone]    MD ordered UA

## 2022-04-30 NOTE — PROGRESS NOTES
[facility]    Medicine Progress Note - Hospitalist Service       Date of Admission:  4/28/2022    Assessment & Plan   Janet Figueroa is a 71 year old female with complex PMHx including rectal cancer, CAD s/p PCI, paroxysmal a-fib on chronic anticoagulation with apixaban, renal artery stenosis, CKD, prediabetes, and COPD who was admitted to the Colorectal Surgery service on 4/28/2022 for low anterior resection with diverting loop ileostomy. Hospitalist service was consulted for post-op medical management    Rectal cancer s/p low anterior resection with diverting loop ileostomy on 4/28/2022  * Underwent procedure under GETA by Dr. Connolly, minimal EBL noted  - Post-op cares, pain control, and VTE prophylaxis as per CRS  - Gottlieb catheter in place, 4/28 to present    Pyuria  Hematuria, Resolved  * UA ordered on 4/30 for hematuria which has resolved (suspect traumatic Gottlieb catheter placement), but showed large LE and 29 wbcs. No fever/leukocytosis noted   - Patient does report urgency which seems to be chronic  - Will defer antibiotics for now and follow up urine culture    Paroxysmal atrial fibrillation   * PTA: carvedilol 12.5 mg BID, apixaban 5 mg BID  - Holding apixaban; resume once cleared by CRS  - On prophylactic enoxaparin per CRS  - Continues on PTA carvedilol    CAD, native heart, native vessels s/p PCI (RCA, 11/2019; LCx, 07/2008)  Chronic diastolic CHF  Essential hypertension  * PTA: carvedilol 12.5 mg BID, amlodipine 5 mg daily, irbesartan 300 mg daily, torsemide 20 mg BID, spironolactone 25 mg daily, atorvastatin 80 mg qhs  * Coronary angiogram (01/2020) showed no evidence for recurrent occlusive CAD  * TTE (11/2019) showed EF 55%, mild left atrial enlargement, mild MR, and trace TR  - Holding torsemide and spironolactone until taking more PO  - Continues on other PTA meds     CKD, stage IIIa  History of fibromuscular dysplasia of the right renal artery  - Creatinine stable ~1  range     COPD  * Chronic and stable on Incruse daily, albuterol prn  * Appears compensated without evidence of exacerbation    Prediabetes  Chronic and stable. No acute needs    Hypothyroidism  Chronic and stable on levothroxine     Major recurrent depressive disorder with anxiety  Chronic and stable on escitalopram     Diet: Full Liquid Diet    DVT Prophylaxis: Enoxaparin (Lovenox) SQ as per CRS  Gottlieb Catheter: PRESENT, indication: /GI/GYN Pelvic Procedure  Code Status: Full Code         Disposition: Expected discharge as per CRS    The patient's care was discussed with the Bedside Nurse and Patient.    Eda Andrews MD  Hospitalist Service  [facility]  Contact information available via [facility] Paging/Directory    ______________________________________________________________________    Interval History   No acute events overnight. Reports urinary urgency that resolved last night, but recurred after eating this morning. States this is somewhat chronic. Gottlieb catheter still in place; hematuria appears to have resolved. Otherwise denies cp/sob, tolerating full liquids    Time Spent on this Encounter   I spent 35 minutes on the unit/floor managing the care of Janet Figueroa. Over 50% of my time was spent on the following:   - Counseling the patient and/or family regarding: results of UA. Discussed deferring antibiotics for now. Patient was quite anxious about changes in the appearance of her urine over the past 24 hours. Reassured patient that findings seemed fairly typical.   - Coordination of care with the: nurse    Eda Andrews MD    Data reviewed today: I reviewed all medications, new labs and imaging results over the last 24 hours. I personally reviewed no images or EKG's today.    Physical Exam   Vital Signs: Temp: 97  F (36.1  C) Temp src: Oral BP: 127/51 Pulse: 60   Resp: 18 SpO2: 96 % O2 Device: None (Room air)    Weight: 197 lbs 0 oz  Constitutional: Sitting up  in chair, NAD  HEENT: Sclera white, MMM  Respiratory: Breathing non-labored. Lungs CTAB - no wheezes, crackles, or rhonchi  Cardiovascular: Heart RRR, no m/r/g. Trace pedal edema  GI: +BS, abd distended, but soft/NT  Skin/Integument: No rash  Musculoskeletal: Normal muscle bulk and tone  Neuro: Alert and appropriate, SANCHEZ  Psych: Anxious    Data   Recent Labs   Lab 04/29/22  0635 04/29/22  0157 04/28/22  1120   WBC  --   --  5.5   HGB 13.1  --  14.1   MCV  --   --  104*   PLT  --   --  319     --  138   POTASSIUM 4.1  --  4.1   CHLORIDE 112*  --  110*   CO2 24  --  25   BUN 13  --  18   CR 1.00  --  1.18*   ANIONGAP 3  --  3   GISELLE 9.0  --  9.8   GLC 99 132* 108*         No results found for this or any previous visit (from the past 24 hour(s)).    Medications     lactated ringers 50 mL/hr at 04/29/22 1807       acetaminophen  975 mg Oral Q8H     amiodarone  200 mg Oral Daily     amLODIPine  5 mg Oral Daily     atorvastatin  80 mg Oral At Bedtime     carvedilol  12.5 mg Oral BID w/meals     enoxaparin ANTICOAGULANT  40 mg Subcutaneous Q24H     escitalopram  10 mg Oral Daily     irbesartan  300 mg Oral At Bedtime     levothyroxine  25 mcg Oral QAM AC     [Held by provider] spironolactone  25 mg Oral Daily     [Held by provider] torsemide  20 mg Oral BID     umeclidinium  1 puff Inhalation Daily

## 2022-04-30 NOTE — PROVIDER NOTIFICATION
0950: overnight UA results: increased WBC of 29.    Response: will watch urine culture and watch for worsening symptoms

## 2022-04-30 NOTE — PLAN OF CARE
Goal Outcome Evaluation:  Pt alert and oriented to place, but displayed disorganized thinking

## 2022-04-30 NOTE — PROGRESS NOTES
Pt A&Ox4; pleasantly confused at times; asks questions that were already answered repeatedly. Hooker cath, ileostomy, and HENNY drain in place; patent. Urine in hooker blood tinged; has been like this per pt and aide; could have been trauma from hooker insertion. Consulted charge regarding this issue; will address issue tomorrow morning. Pt refused to wear PCDs this shift; education provided but still refused.

## 2022-04-30 NOTE — PROGRESS NOTES
Pt. A/Ox3 with forgetfulness.VSS on RA. Had pressure on the abdomen earlier on shift but was able to clear by self.  Has hooker with good output. UA was collected during shift due to clots observed earlier on  The day.  Pt has a closed suction drainage with 100 mL serosanguineous bright red fluid. Pain was managed by scheduled tylenol and PRN oxycodone.

## 2022-05-01 LAB
BACTERIA UR CULT: NO GROWTH
CREAT FLD-MCNC: 0.9 MG/DL
CREATININE BODY FLUID SOURCE: NORMAL
PLATELET # BLD AUTO: 264 10E3/UL (ref 150–450)

## 2022-05-01 PROCEDURE — 250N000013 HC RX MED GY IP 250 OP 250 PS 637: Performed by: NURSE PRACTITIONER

## 2022-05-01 PROCEDURE — 82570 ASSAY OF URINE CREATININE: CPT | Performed by: COLON & RECTAL SURGERY

## 2022-05-01 PROCEDURE — 250N000011 HC RX IP 250 OP 636: Performed by: COLON & RECTAL SURGERY

## 2022-05-01 PROCEDURE — 120N000001 HC R&B MED SURG/OB

## 2022-05-01 PROCEDURE — 36415 COLL VENOUS BLD VENIPUNCTURE: CPT | Performed by: COLON & RECTAL SURGERY

## 2022-05-01 PROCEDURE — 85049 AUTOMATED PLATELET COUNT: CPT | Performed by: COLON & RECTAL SURGERY

## 2022-05-01 PROCEDURE — 999N000128 HC STATISTIC PERIPHERAL IV START W/O US GUIDANCE

## 2022-05-01 PROCEDURE — 258N000003 HC RX IP 258 OP 636: Performed by: COLON & RECTAL SURGERY

## 2022-05-01 PROCEDURE — 250N000013 HC RX MED GY IP 250 OP 250 PS 637: Performed by: COLON & RECTAL SURGERY

## 2022-05-01 PROCEDURE — 99232 SBSQ HOSP IP/OBS MODERATE 35: CPT | Performed by: INTERNAL MEDICINE

## 2022-05-01 RX ADMIN — AMIODARONE HYDROCHLORIDE 200 MG: 200 TABLET ORAL at 09:38

## 2022-05-01 RX ADMIN — ACETAMINOPHEN 650 MG: 325 TABLET ORAL at 18:43

## 2022-05-01 RX ADMIN — CARVEDILOL 12.5 MG: 12.5 TABLET, FILM COATED ORAL at 09:38

## 2022-05-01 RX ADMIN — LEVOTHYROXINE SODIUM 25 MCG: 25 TABLET ORAL at 06:41

## 2022-05-01 RX ADMIN — SIMETHICONE 80 MG: 80 TABLET, CHEWABLE ORAL at 19:59

## 2022-05-01 RX ADMIN — UMECLIDINIUM 1 PUFF: 62.5 AEROSOL, POWDER ORAL at 09:39

## 2022-05-01 RX ADMIN — CARVEDILOL 12.5 MG: 12.5 TABLET, FILM COATED ORAL at 18:38

## 2022-05-01 RX ADMIN — OXYCODONE HYDROCHLORIDE 2.5 MG: 5 TABLET ORAL at 05:05

## 2022-05-01 RX ADMIN — ACETAMINOPHEN 975 MG: 325 TABLET ORAL at 06:41

## 2022-05-01 RX ADMIN — HYDROMORPHONE HYDROCHLORIDE 0.2 MG: 0.2 INJECTION, SOLUTION INTRAMUSCULAR; INTRAVENOUS; SUBCUTANEOUS at 23:47

## 2022-05-01 RX ADMIN — ONDANSETRON 4 MG: 2 INJECTION INTRAMUSCULAR; INTRAVENOUS at 19:57

## 2022-05-01 RX ADMIN — ESCITALOPRAM OXALATE 10 MG: 10 TABLET ORAL at 09:39

## 2022-05-01 RX ADMIN — AMLODIPINE BESYLATE 5 MG: 5 TABLET ORAL at 09:39

## 2022-05-01 RX ADMIN — OXYCODONE HYDROCHLORIDE 5 MG: 5 TABLET ORAL at 09:39

## 2022-05-01 RX ADMIN — ENOXAPARIN SODIUM 40 MG: 40 INJECTION SUBCUTANEOUS at 18:38

## 2022-05-01 RX ADMIN — SODIUM CHLORIDE, POTASSIUM CHLORIDE, SODIUM LACTATE AND CALCIUM CHLORIDE: 600; 310; 30; 20 INJECTION, SOLUTION INTRAVENOUS at 08:01

## 2022-05-01 RX ADMIN — OXYCODONE HYDROCHLORIDE 5 MG: 5 TABLET ORAL at 20:06

## 2022-05-01 ASSESSMENT — ACTIVITIES OF DAILY LIVING (ADL)
ADLS_ACUITY_SCORE: 8
ADLS_ACUITY_SCORE: 6
ADLS_ACUITY_SCORE: 8
ADLS_ACUITY_SCORE: 6
ADLS_ACUITY_SCORE: 6
ADLS_ACUITY_SCORE: 8
ADLS_ACUITY_SCORE: 6
ADLS_ACUITY_SCORE: 8
ADLS_ACUITY_SCORE: 8
ADLS_ACUITY_SCORE: 6
ADLS_ACUITY_SCORE: 8
ADLS_ACUITY_SCORE: 6
ADLS_ACUITY_SCORE: 8
ADLS_ACUITY_SCORE: 8
ADLS_ACUITY_SCORE: 6
ADLS_ACUITY_SCORE: 8
ADLS_ACUITY_SCORE: 6
ADLS_ACUITY_SCORE: 8

## 2022-05-01 NOTE — PLAN OF CARE
Goal Outcome Evaluation:  Pt is A and orientated, except to day, occasionally forgetful and crying. Abd sl distended,  VSS except for bradycardia. HR 57. Dressing changed surrounding the HENNY site was changed due to leaking. Bilateral abdominal incisions CDI. LR infusing per R arm, Left IV had infiltrated. L forearm, sl swollen, Warm pack offered. Gottlieb and Ileostomy intact.

## 2022-05-01 NOTE — PROGRESS NOTES
"Colon & Rectal Surgery Progress Note             Interval History:   Postop Day #: 3  Good UOP and bowel function. No nausea, tolerating fulls.  Gottlieb out this am and voiding.                 Medications:   I have reviewed this patient's current medications               Physical Exam:   Blood pressure (!) 125/99, pulse 58, temperature 97.7  F (36.5  C), temperature source Oral, resp. rate 16, height 1.6 m (5' 3\"), weight 89.4 kg (197 lb), SpO2 95 %.    Intake/Output Summary (Last 24 hours) at 5/1/2022 1016  Last data filed at 5/1/2022 1000  Gross per 24 hour   Intake --   Output 3485 ml   Net -3485 ml     GEN:  Alert, appropriate mood and affect.  ABD:  Soft, round, incisions CDI, HENNY clear yellow, stoma pink and productive.         Data:        Lab Results   Component Value Date     04/30/2022    Lab Results   Component Value Date    CHLORIDE 111 04/30/2022    Lab Results   Component Value Date    BUN 9 04/30/2022      Lab Results   Component Value Date    POTASSIUM 3.8 04/30/2022    Lab Results   Component Value Date    CO2 25 04/30/2022    Lab Results   Component Value Date    CR 0.90 04/30/2022    CR 1.00 04/29/2022        Lab Results   Component Value Date    HGB 11.9 04/30/2022    HGB 13.1 04/29/2022     Lab Results   Component Value Date     05/01/2022     04/28/2022     Lab Results   Component Value Date    WBC 5.5 04/28/2022            Assessment and Plan:   Doing well  Stoma care and teaching  Path pending  Will send HENNY for crt.   Low fiber diet  Oral pain meds  Encourage ambulation.  Lovenox now, eliquis at discharge if hgb stable.   Possible discharge tomorrow.      Lay Connolly MD  [facility]  Office Phone # [phone]    "

## 2022-05-01 NOTE — PLAN OF CARE
Goal Outcome Evaluation:     Pt is A&O x3 occasionally forgetful. VSS other than /61. Pt up to the bathroom with assistance of 1 with gait belt and walker. Gottlieb removed at 0000. Pt voided 300 ml's at 0452.  Bladder scan of 8 ml's HENNY drainage is serosanguenous with a clot noted. HENNY site dressings changed x3 due to leaking. Ileostomy drainage 50cc's greenish brown clear drainage. Pain managed with IV Dilaudid and oral oxycodone.

## 2022-05-01 NOTE — CARE PLAN
Client crying most of the night, she stted she has abdominal distention, cramping and leak from the drainage site + hooker. Hooker removed with good out put due to void. Provided pain med including gas relive meds. Encu raged to get up at the bedside able to get up with assistX1. At times can able to cristy conversation, yet she can switch to crying claims she is not focused on what she is talking. Offered walk outside of the room she declined it. Warm and cold pack offered with repositions.

## 2022-05-01 NOTE — OP NOTE
Procedure Date: 04/28/2022    PREOPERATIVE DIAGNOSIS:  Rectal cancer.    POSTOPERATIVE DIAGNOSIS:  Rectal cancer.    PROCEDURES:  1.  Robotic-assisted low anterior resection with colorectal anastomosis.  2.  Laparoscopic mobilization of splenic flexure.  3.  Rigid proctoscopy.  4.  Diverting loop ileostomy.  5.  Intraoperative fluorescein angiography.    SURGEON:  Lay Connolly MD    CO-SURGEON:  Dora Taveras MD    ASSISTANTS:  1.  Artur Bundy PA-C  2.  Shannan Heredia MD, Colon and Rectal Surgery Fellow    ANESTHESIA:  General endotracheal anesthesia plus a regional block with a TAP block done laparoscopically.    INDICATIONS FOR PROCEDURE:  Janet is a 71-year-old woman, who was found to have a 2 cm distal rectal polyp that was removed.  The pathology showed focal adenocarcinoma.  Repeat biopsies also showed residual adenocarcinoma.  An ultrasound was performed that did not show significant lymphadenopathy.  We reviewed the option for local resection versus radical resection.  She wished to proceed with radical resection, wishing to lower her risk of recurrence and untreated lymphadenopathy.  She was briefed on the risks of bleeding, infection, anastomotic leak, possible need for further procedures, and other risks associated with major abdominal surgery and general anesthesia including but not limited to DVT, PE, heart attack, stroke, other cardiopulmonary events.  She understood and wished to proceed.    FINDINGS:  There was a scar and tattooing in the distal rectum with a small amount of ulceration.  The distal anastomosis was about a centimeter and a half grossly, and an EEA stapler was used to create the anastomosis 4 cm from the anal verge with a negative pneumatic leak test.    DESCRIPTION OF PROCEDURE:  After informed consent was obtained, the patient was taken to the operating room, positioned on the operating table in the supine position, and was intubated.  She was then positioned in modified  lithotomy position, and a Gottlieb catheter and orogastric tube were placed.  Both arms were padded and tucked at her side, and she was positioned in modified lithotomy position.  The abdomen was then shaved, prepped, and draped in the usual fashion.  A stab wound was created in the left upper quadrant in the subcostal region.  The Veress needle was used to establish pneumoperitoneum.  An 8 mm incision was made in the supraumbilical region, and an 8 mm trocar was placed without difficulty.  The robotic camera was used to survey the abdomen, and there was no evidence of injury or other concerns.  The Veress needle was removed, and we maintained insufflation through the port at a pressure of 15.  A TAP block was then performed using 7.5 mL of 0.5% Marcaine with epinephrine into each of the 4 quadrants.  We fully inspected around the abdomen and did not find any evidence of peritoneal or liver disease.    We placed 2 additional 8 mm ports on the left, and it aligned transversely just above the umbilicus.  Similarly, we placed an 8 mm, which was eventually upsized to a 12 mm robotic trocar in the right lower quadrant and an 8 mm AirSeal in the right upper quadrant at her previously marked stoma site.  We then switched to the AirSeal device and had excellent pneumoperitoneum throughout the case.  We used the LigaSure laparoscopically to fully mobilize the splenic flexure, taking the omentum off the lateral third of the transverse colon and developing a plane up off of the retroperitoneum, fully mobilizing the mesentery of the descending and distal transverse colon.  After completing this, the patient was then positioned in steep Trendelenburg, and the small bowel was swept out of the pelvis.  We docked the robot over the left side, sending the robot instruments for targeting down in the pelvis.  We used the small grasping retractor on the far left in arm 1 and the tip up in arm 2.  The camera was placed in arm 3, and a  monopolar system scissors were in arm 4.  We then, as needed, switched out arm 4 for the vessel sealing and the robotic stapler throughout the case.    We performed a lateral to medial mobilization using the scissors to incise the lateral attachments of the sigmoid colon, which appeared to have some diverticular disease, although that was not the primary reason we were there.  We could identify the ureter in the retroperitoneum, having taken the sigmoid colon up off of the left pelvic sidewall.  This allowed us to straighten out the rectum and sigmoid.  We then elevated the sigmoid colon up off of the sacral promontory and incised the peritoneum on the patient's right side of the mesentery.  This allowed us then to elevate up the mesentery off the retroperitoneum and join our lateral dissection.  We then worked our way cephalad to identify the inferior mesenteric artery near its takeoff.  After confirming the ureter was well free from our plane of dissection, we ligated the vessel with the vessel sealing device with excellent hemostasis.  We then carried the dissection up towards the transverse colon, incising the mesentery through its bare area.  At this point, we could see that we had excellent mobilization of the descending colon that would hopefully reach to our pelvic floor, where we intended to create our anastomosis.  We then turned our attention back down towards the pelvis and began the pelvic dissection.  The mesorectum was raised up off of the sacral promontory, and the dissection was carried down through the areolar plane as far as we could go posteriorly and then on each side.  As we began to get deep into the pelvis, we incised the plane between the rectum and vagina anteriorly and carried this dissection down, leaving the lateral stalks.  Having done so, we then carried our dissection around on each side.  Of note, she did have some redundancy and laxity of the pelvic floor tissues, making the  dissection off of the pelvic floor somewhat difficult.  Having completed this around circumferentially, I then performed a proctoscopy and we could see the point tattooing in the very distal rectum, roughly 6 cm from the anal verge.  Given how low this was, we ended up laparoscopically placing a stitch at the area of our tattoo while I was watching visually through the proctoscopy to ensure we could see our margin as this was a bit difficult as I had done very little tattooing.  We then aligned our 45 green load stapler and divided the distal rectum anteroposteriorly after assuring we had a good margin.  A second fire was used to divide some minimal residual tissue, and this was completed without difficulty.    We then turned our attention to the sigmoid descending junction and divided the remaining mesentery perpendicular, incorporating the vascular pedicle in the specimen.  Having done so, we did fluorescein angiography and could see a clear demarcation where we intended to divide, with good perfusion of our conduit.  We then undocked the robot and opened a roughly 5 cm Pfannenstiel incision and placed a 5-9 Cristi.  We easily delivered the specimen out and placed a Jeevan and Kocher, where we intended to divide using the LigaSure to divide minimal additional mesentery.  We then placed a 2-0 Prolene pursestring and secured that with an additional Vicryl tie.  We dropped the anvil back into the abdomen, and reestablished pneumoperitoneum by occluding the Cristi.  Dr. Taveras then went below and passed the sizers, followed by the EEA stapler, advancing the spike through the staple line in its midportion.  The 2 ends were docked.  We did do an additional angiography prior to firing and could see that both ends were well perfused.  The stapling device was closed, tightened, and fired.  It was removed without difficulty, and 2 intact anastomotic rings were noted and sent to pathology.  Of note, prior to firing, we did  open the specimen and could see there was a roughly 1.5 cm more margin to the staple line.    The anastomosis was tested by occluding the bowel proximal, filling the pelvis with saline, and insufflating with the proctoscope.  Dr. Taveras was able to see the anastomosis and found it to be widely patent, well perfused, tension free, and hemostatic at 4 cm from the anal verge.  The air leak test was negative.  The gas was evacuated as was the effluent, which was nice and clear in the pelvis.  A 19 round drain was brought out through the left lateral port site and secured with a silk tie.  The right lower quadrant port site was secured with an 0 Vicryl using the Rajan-Manuel.  We identified a loop of bowel roughly 25 cm proximal to the ileocecal valve as our intended ileostomy as her anastomosis was so low.  We removed the AirSeal and used this as planned for our ileostomy.  A disc of skin was removed and the fascia was incised in a cruciate fashion and the muscle split and the posterior sheath was also incised.  This comfortably accepted a fingerbreadth and a half, and we were able to deliver out the loop of bowel.  We reinflated to ensure proper orientation with the functional looping cephalad.  This was brought out, and a small clamp was used through the mesentery to secure orientation.  We then removed the other 2 ports under direct vision and desufflated the abdomen.  The gloves were changed, and we switched to clean instruments.  The Cristi was removed.  The peritoneum was reapproximated with 2-0 Vicryl.  The fascia was reapproximated with 0 looped PDS.  Each layer was irrigated, and the skin edges of the 3 remaining ports and Pfannenstiel incision were reapproximated with 4-0 Monocryl and Exofin.  The stoma was then matured in a standard Erica fashion using 3-0 Vicryl, and an appliance was placed as well as a drain sponge.  She was returned to the supine position, extubated, and taken to recovery in stable  condition.    ESTIMATED BLOOD LOSS:  30 mL.    SPECIMEN:  The sigmoid colon and rectum along with the anastomotic rings. The proximal end was without thi.    Lay Connolly MD        D: 2022   T: 2022   MT: dominique    Name:     ED CIFUENTES  MRN:      [id number]        Account:        [id number]   :      1950           Procedure Date: 2022     Document: [id number]    cc:  Melisas Silva MD

## 2022-05-01 NOTE — PROGRESS NOTES
[facility]    Medicine Progress Note - Hospitalist Service       Date of Admission:  4/28/2022    Assessment & Plan   Janet Figueroa is a 71 year old female with complex PMHx including rectal cancer, CAD s/p PCI, paroxysmal a-fib on chronic anticoagulation with apixaban, renal artery stenosis, CKD, prediabetes, and COPD who was admitted to the Colorectal Surgery service on 4/28/2022 for low anterior resection with diverting loop ileostomy. Hospitalist service was consulted for post-op medical management    Rectal cancer s/p low anterior resection with diverting loop ileostomy on 4/28/2022  * Underwent procedure under GETA by Dr. Connolly, minimal EBL noted  - Post-op cares, pain control, and VTE prophylaxis as per CRS    Pyuria  Hematuria, Resolved  * UA ordered on 4/30 for hematuria which has resolved (suspect traumatic Gottlieb catheter placement which has since been removed); also showed large LE and 29 wbcs. Patient reported urgency, which seems to be chronic. No fever/leukocytosis noted   - Defer antibiotics for now, 4/30 urine culture results pending    Paroxysmal atrial fibrillation   * PTA: carvedilol 12.5 mg BID, apixaban 5 mg BID  - Holding apixaban; resume once cleared by CRS  - On prophylactic enoxaparin per CRS  - Continues on PTA carvedilol    CAD, native heart, native vessels s/p PCI (RCA, 11/2019; LCx, 07/2008)  Chronic diastolic CHF  Essential hypertension  * PTA: carvedilol 12.5 mg BID, amlodipine 5 mg daily, irbesartan 300 mg daily, torsemide 20 mg BID, spironolactone 25 mg daily, atorvastatin 80 mg qhs  * Coronary angiogram (01/2020) showed no evidence for recurrent occlusive CAD  * TTE (11/2019) showed EF 55%, mild left atrial enlargement, mild MR, and trace TR  - Resume PTA torsemide and spironolactone today, 5/1  - Continues on other PTA meds     CKD, stage IIIa  History of fibromuscular dysplasia of the right renal artery  - Creatinine stable ~1 range     COPD  *  "Chronic and stable on Incruse daily, albuterol prn  * Appears compensated without evidence of exacerbation    Prediabetes  Chronic and stable. No acute needs    Hypothyroidism  Chronic and stable on levothroxine     Major recurrent depressive disorder with anxiety  Chronic and stable on escitalopram     Diet: Low Fiber Diet    DVT Prophylaxis: Enoxaparin (Lovenox) SQ as per CRS  Gottlieb Catheter: Not present  Code Status: Full Code         Disposition: Expected discharge as per CRS    The patient's care was discussed with the Patient.    Eda Andrews MD  Hospitalist Service  [facility]  Contact information available via [facility] Paging/Directory    ______________________________________________________________________    Interval History   No acute events overnight. States she had a \"really traumatic night\" mainly due to ongoing abdominal pain. Gottlieb catheter removed with good UOP. Denies cp/sob, tolerating low fiber diet.    Eda Andrews MD    Data reviewed today: I reviewed all medications, new labs and imaging results over the last 24 hours. I personally reviewed no images or EKG's today.    Physical Exam   Vital Signs: Temp: 97.7  F (36.5  C) Temp src: Oral BP: (!) 125/99 Pulse: 58   Resp: 16 SpO2: 95 % O2 Device: None (Room air)    Weight: 197 lbs 0 oz  Constitutional: Sitting up in bed, NAD  HEENT: Sclera white, MMM  Respiratory: Breathing non-labored. Lungs CTAB - no wheezes, crackles, or rhonchi  Cardiovascular: Heart RRR, no m/r/g. Trace pedal edema  GI: +BS, abd distended, but soft/NT. Ileostomy with clear, green liquid; HENNY drain with serosanguinous fluid.  Skin/Integument: No rash  Musculoskeletal: Normal muscle bulk and tone  Neuro: Alert and appropriate, SANCHEZ  Psych: Anxious    Data   Recent Labs   Lab 05/01/22  0656 04/30/22  0725 04/30/22  0616 04/29/22  0635 04/29/22  0157 04/28/22  1120   WBC  --   --   --   --   --  5.5   HGB  --  11.9  --  13.1  --  14.1   MCV  " --   --   --   --   --  104*     --   --   --   --  319   NA  --  139  --  139  --  138   POTASSIUM  --  3.8  --  4.1  --  4.1   CHLORIDE  --  111*  --  112*  --  110*   CO2  --  25  --  24  --  25   BUN  --  9  --  13  --  18   CR  --  0.90  --  1.00  --  1.18*   ANIONGAP  --  3  --  3  --  3   GISELLE  --  8.6  --  9.0  --  9.8   GLC  --  94 84 99   < > 108*    < > = values in this interval not displayed.         No results found for this or any previous visit (from the past 24 hour(s)).    Medications     lactated ringers 50 mL/hr at 05/01/22 0801       acetaminophen  975 mg Oral Q8H     amiodarone  200 mg Oral Daily     amLODIPine  5 mg Oral Daily     atorvastatin  80 mg Oral At Bedtime     carvedilol  12.5 mg Oral BID w/meals     enoxaparin ANTICOAGULANT  40 mg Subcutaneous Q24H     escitalopram  10 mg Oral Daily     irbesartan  300 mg Oral At Bedtime     levothyroxine  25 mcg Oral QAM AC     [Held by provider] spironolactone  25 mg Oral Daily     [Held by provider] torsemide  20 mg Oral BID     umeclidinium  1 puff Inhalation Daily

## 2022-05-02 ENCOUNTER — APPOINTMENT (OUTPATIENT)
Dept: GENERAL RADIOLOGY | Facility: CLINIC | Age: 72
DRG: 330 | End: 2022-05-02
Attending: COLON & RECTAL SURGERY
Payer: MEDICARE

## 2022-05-02 LAB
ANION GAP SERPL CALCULATED.3IONS-SCNC: 5 MMOL/L (ref 3–14)
BUN SERPL-MCNC: 11 MG/DL (ref 7–30)
CALCIUM SERPL-MCNC: 9.2 MG/DL (ref 8.5–10.1)
CHLORIDE BLD-SCNC: 104 MMOL/L (ref 94–109)
CO2 SERPL-SCNC: 26 MMOL/L (ref 20–32)
CREAT SERPL-MCNC: 1.03 MG/DL (ref 0.52–1.04)
ERYTHROCYTE [DISTWIDTH] IN BLOOD BY AUTOMATED COUNT: 13.2 % (ref 10–15)
GFR SERPL CREATININE-BSD FRML MDRD: 57 ML/MIN/1.73M2
GLUCOSE BLD-MCNC: 113 MG/DL (ref 70–99)
HCT VFR BLD AUTO: 41.1 % (ref 35–47)
HGB BLD-MCNC: 13.6 G/DL (ref 11.7–15.7)
MAGNESIUM SERPL-MCNC: 2.1 MG/DL (ref 1.6–2.3)
MCH RBC QN AUTO: 33.9 PG (ref 26.5–33)
MCHC RBC AUTO-ENTMCNC: 33.1 G/DL (ref 31.5–36.5)
MCV RBC AUTO: 103 FL (ref 78–100)
PHOSPHATE SERPL-MCNC: 4 MG/DL (ref 2.5–4.5)
PLATELET # BLD AUTO: 296 10E3/UL (ref 150–450)
POTASSIUM BLD-SCNC: 3.9 MMOL/L (ref 3.4–5.3)
RBC # BLD AUTO: 4.01 10E6/UL (ref 3.8–5.2)
SODIUM SERPL-SCNC: 135 MMOL/L (ref 133–144)
WBC # BLD AUTO: 7.8 10E3/UL (ref 4–11)

## 2022-05-02 PROCEDURE — 258N000003 HC RX IP 258 OP 636: Performed by: COLON & RECTAL SURGERY

## 2022-05-02 PROCEDURE — 99233 SBSQ HOSP IP/OBS HIGH 50: CPT | Performed by: HOSPITALIST

## 2022-05-02 PROCEDURE — 250N000011 HC RX IP 250 OP 636: Performed by: COLON & RECTAL SURGERY

## 2022-05-02 PROCEDURE — 82310 ASSAY OF CALCIUM: CPT | Performed by: INTERNAL MEDICINE

## 2022-05-02 PROCEDURE — 85027 COMPLETE CBC AUTOMATED: CPT | Performed by: INTERNAL MEDICINE

## 2022-05-02 PROCEDURE — 999N000197 HC STATISTIC WOC PT EDUCATION, 0-15 MIN

## 2022-05-02 PROCEDURE — 84100 ASSAY OF PHOSPHORUS: CPT | Performed by: STUDENT IN AN ORGANIZED HEALTH CARE EDUCATION/TRAINING PROGRAM

## 2022-05-02 PROCEDURE — 74018 RADEX ABDOMEN 1 VIEW: CPT

## 2022-05-02 PROCEDURE — 120N000001 HC R&B MED SURG/OB

## 2022-05-02 PROCEDURE — 36415 COLL VENOUS BLD VENIPUNCTURE: CPT | Performed by: INTERNAL MEDICINE

## 2022-05-02 PROCEDURE — 999N000065 XR ABDOMEN PORT 1 VIEWS

## 2022-05-02 PROCEDURE — 83735 ASSAY OF MAGNESIUM: CPT | Performed by: STUDENT IN AN ORGANIZED HEALTH CARE EDUCATION/TRAINING PROGRAM

## 2022-05-02 PROCEDURE — 250N000009 HC RX 250: Performed by: HOSPITALIST

## 2022-05-02 PROCEDURE — 250N000011 HC RX IP 250 OP 636: Performed by: INTERNAL MEDICINE

## 2022-05-02 RX ORDER — METOPROLOL TARTRATE 1 MG/ML
5 INJECTION, SOLUTION INTRAVENOUS EVERY 6 HOURS
Status: DISCONTINUED | OUTPATIENT
Start: 2022-05-02 | End: 2022-05-12

## 2022-05-02 RX ORDER — PROCHLORPERAZINE MALEATE 5 MG
5 TABLET ORAL EVERY 6 HOURS PRN
Status: DISCONTINUED | OUTPATIENT
Start: 2022-05-02 | End: 2022-05-17 | Stop reason: HOSPADM

## 2022-05-02 RX ORDER — DEXTROSE MONOHYDRATE, SODIUM CHLORIDE, AND POTASSIUM CHLORIDE 50; 1.49; 4.5 G/1000ML; G/1000ML; G/1000ML
INJECTION, SOLUTION INTRAVENOUS CONTINUOUS
Status: DISCONTINUED | OUTPATIENT
Start: 2022-05-02 | End: 2022-05-04

## 2022-05-02 RX ORDER — HYDRALAZINE HYDROCHLORIDE 20 MG/ML
10 INJECTION INTRAMUSCULAR; INTRAVENOUS EVERY 4 HOURS PRN
Status: DISCONTINUED | OUTPATIENT
Start: 2022-05-02 | End: 2022-05-17 | Stop reason: HOSPADM

## 2022-05-02 RX ORDER — PROCHLORPERAZINE 25 MG
12.5 SUPPOSITORY, RECTAL RECTAL EVERY 12 HOURS PRN
Status: DISCONTINUED | OUTPATIENT
Start: 2022-05-02 | End: 2022-05-17 | Stop reason: HOSPADM

## 2022-05-02 RX ADMIN — ONDANSETRON 4 MG: 2 INJECTION INTRAMUSCULAR; INTRAVENOUS at 04:01

## 2022-05-02 RX ADMIN — HYDROMORPHONE HYDROCHLORIDE 0.2 MG: 0.2 INJECTION, SOLUTION INTRAMUSCULAR; INTRAVENOUS; SUBCUTANEOUS at 04:01

## 2022-05-02 RX ADMIN — PROCHLORPERAZINE EDISYLATE 5 MG: 5 INJECTION INTRAMUSCULAR; INTRAVENOUS at 20:28

## 2022-05-02 RX ADMIN — ONDANSETRON 4 MG: 2 INJECTION INTRAMUSCULAR; INTRAVENOUS at 18:06

## 2022-05-02 RX ADMIN — POTASSIUM CHLORIDE, DEXTROSE MONOHYDRATE AND SODIUM CHLORIDE: 150; 5; 450 INJECTION, SOLUTION INTRAVENOUS at 09:16

## 2022-05-02 RX ADMIN — ONDANSETRON 4 MG: 2 INJECTION INTRAMUSCULAR; INTRAVENOUS at 10:13

## 2022-05-02 RX ADMIN — METOPROLOL TARTRATE 5 MG: 5 INJECTION INTRAVENOUS at 20:27

## 2022-05-02 RX ADMIN — ENOXAPARIN SODIUM 40 MG: 40 INJECTION SUBCUTANEOUS at 18:06

## 2022-05-02 RX ADMIN — HYDROMORPHONE HYDROCHLORIDE 0.2 MG: 0.2 INJECTION, SOLUTION INTRAMUSCULAR; INTRAVENOUS; SUBCUTANEOUS at 20:29

## 2022-05-02 RX ADMIN — METOPROLOL TARTRATE 5 MG: 5 INJECTION INTRAVENOUS at 13:13

## 2022-05-02 RX ADMIN — SODIUM CHLORIDE, POTASSIUM CHLORIDE, SODIUM LACTATE AND CALCIUM CHLORIDE: 600; 310; 30; 20 INJECTION, SOLUTION INTRAVENOUS at 04:25

## 2022-05-02 RX ADMIN — PROCHLORPERAZINE EDISYLATE 5 MG: 5 INJECTION INTRAMUSCULAR; INTRAVENOUS at 00:24

## 2022-05-02 RX ADMIN — HYDROMORPHONE HYDROCHLORIDE 0.2 MG: 0.2 INJECTION, SOLUTION INTRAMUSCULAR; INTRAVENOUS; SUBCUTANEOUS at 18:06

## 2022-05-02 ASSESSMENT — ACTIVITIES OF DAILY LIVING (ADL)
ADLS_ACUITY_SCORE: 8
ADLS_ACUITY_SCORE: 6
ADLS_ACUITY_SCORE: 8
ADLS_ACUITY_SCORE: 6
ADLS_ACUITY_SCORE: 8
ADLS_ACUITY_SCORE: 6
ADLS_ACUITY_SCORE: 8
ADLS_ACUITY_SCORE: 6
ADLS_ACUITY_SCORE: 8
ADLS_ACUITY_SCORE: 6
ADLS_ACUITY_SCORE: 8
ADLS_ACUITY_SCORE: 8
ADLS_ACUITY_SCORE: 6
ADLS_ACUITY_SCORE: 8
ADLS_ACUITY_SCORE: 6
ADLS_ACUITY_SCORE: 8
ADLS_ACUITY_SCORE: 6
ADLS_ACUITY_SCORE: 8

## 2022-05-02 NOTE — PLAN OF CARE
Goal Outcome Evaluation:     Pt is A&O x4, VSS other than /66, Pt has intermittent nausea. Productive emesis of clear yellow/brown drainage. IV Zofran given with little relief. Abd distended,  Oxycodone given for abdominal pain.  Pt voided and bladder scan done with result of 6 ml's. Pt up with walker and gait belt and assistance of one. IV infusing. Pt on Tele NSR. Orders for NG to be placed and bedside abdominal xray. HENNY site is leaking drsg changed x1.

## 2022-05-02 NOTE — CONSULTS
Care Management Initial Consult    General Information  Assessment completed with: Patient,    Type of CM/SW Visit: Initial Assessment    Primary Care Provider verified and updated as needed:     Readmission within the last 30 days: no previous admission in last 30 days      Reason for Consult: discharge planning  Advance Care Planning:            Communication Assessment  Patient's communication style: spoken language (English or Bilingual)    Hearing Difficulty or Deaf: no   Wear Glasses or Blind: no    Cognitive  Cognitive/Neuro/Behavioral: WDL  Level of Consciousness: alert (pt staes she is forgetful since surgery)  Arousal Level: opens eyes spontaneously  Orientation: oriented x 4  Mood/Behavior: anxious  Best Language: 0 - No aphasia  Speech: clear    Living Environment:   People in home: spouse     Current living Arrangements: house      Able to return to prior arrangements: yes       Family/Social Support:  Care provided by: spouse/significant other  Provides care for: no one  Marital Status:              Description of Support System:           Current Resources:   Patient receiving home care services:       Community Resources:    Equipment currently used at home: none  Supplies currently used at home:      Employment/Financial:  Employment Status: retired        Financial Concerns:             Lifestyle & Psychosocial Needs:  Social Determinants of Health     Tobacco Use: Medium Risk     Smoking Tobacco Use: Former Smoker     Smokeless Tobacco Use: Never Used   Alcohol Use: Not on file   Financial Resource Strain: Not on file   Food Insecurity: Not on file   Transportation Needs: Not on file   Physical Activity: Not on file   Stress: Not on file   Social Connections: Not on file   Intimate Partner Violence: Not on file   Depression: Not on file   Housing Stability: Not on file       Functional Status:  Prior to admission patient needed assistance: none   Mental Health Status:  Mental Health Status:  No Current Concerns     Chemical Dependency Status:            NA    Values/Beliefs:  Spiritual, Cultural Beliefs, Confucianist Practices, Values that affect care:                 Additional Information:  Gerardoruben met with Janet at bedside, introduced self and role in discharge planning.  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  She feels she knows a lot about her ostomy already although would like home RN visits.  Writer reviewed the [facility] dot gov site and the listing of agencies for her area.  Her first choice is [facility], and would be okay with any as long as at least 3 star rated.  Writer made referrals to [facility] and [facility] and [facility]  [facility] is able to start next week. Await to hear back from the other two agencies.  Patient likely to be here at [facility] a few more days.  CC will continue to follow for safe discharge plan.  Grace Saravia RN, BSN  [facility]  Inpatient Care Coordinator  M: [phone]    Grace Saravia RN

## 2022-05-02 NOTE — PROGRESS NOTES
"[facility] Nurse Inpatient Ostomy Assessment     Assessment of new loop Ileostomy     Surgery date:  4/28/22  Surgeon:  Dr. Lay Connolly  Procedure:  Low anterior resection with diverting loop ileostomy  Related diagnosis:  Low rectal cancer    WO Assessment & Physical Exam:        Current status: Pt in bed on her side with NG draining and pt having frequent bouts of vomitting      Date of last photo: 4/29/22          Stoma location: RUQ    Stoma size: 1 1/8\" Visualized through pouch 5/2    Stoma appearance: pink-red, round and edematous Visualized through pouch 5/2    Mucocutaneous junction:  Intact 4/29- not visualized-pouch intact 5/2    Peristomal complication(s): none , bruising not visualized-pouch intact 5/2    Abdominal assessment: distended    Surgical site(s): open to air    NG still in place? No    Output: brown and liquid     Pain: Cramping    Is patient still on a PCA? No      Current pouching system: Geena 2 piece with high output pouch and barrier ring    Pouch last changed:  4/29    Reason for pouch change today: ostomy education      Learning and comprehension: Patient emotional, tired and very ill, unable to perform teaching today. Supported pt by assisting her to wash face, hands and get fresh emesis bag.  Patient reports good teaching 4/29      Psychosocial: Patient is overwhelmed post op. She is hoping this will resolve over the weekend.      WOC Plan:         Pouching product plan: Helena 2 piece 57mm flat with ring and lock and roll    Comments: monitor for abdominal changes that may require changes to pouching    Frequency of pouch changes: 2-3x weekly      Pt support system on discharge: patient reports speaking to her son on the phone. But not discussed today    WO recommend home care?  yes      WO follow-up plan: daily M-F      Objective Data:       Patient history according to medical record: Janet Figueroa is a 71 year old female admitted on " 4/28/2022. She has a past medical history of hypertension, mild COPD, CAD s/p PCI, dyslipidemia, paroxysmal A. Fib on eliquis, CKD, stage IIIb, renal artery stenosis, hypothyroidism, depression/anxiety, prediabetes, GERD, renal artery stenosis, who is status post low anterior resection with diverting loop ileostomy due to rectal cancer.      Current Diet/Nutrition:   Orders Placed This Encounter      NPO for Medical/Clinical Reasons Except for: Meds       Output:  I/O last 3 completed shifts:  In: 15 [NG/GT:15]  Out: 3410 [Urine:900; Emesis/NG output:1550; Drains:535; Stool:425]      Labs:   Recent Labs   Lab 05/02/22  0728   HGB 13.6   WBC 7.8         Star Risk Assessment:   Sensory Perception: 4-->no impairment  Moisture: 4-->rarely moist  Activity: 3-->walks occasionally  Mobility: 3-->slightly limited  Nutrition: 2-->probably inadequate  Friction and Shear: 3-->no apparent problem  Star Score: 19      WOC Interventions:       Patient's chart evaluated    Focus of today's visit: Support cleaning up slightly as she is very ill today     Pouch changed 4/29    Participant(s) in teaching session today: patient  and nurse    Change made with ostomy management today: Yes    Supplies: Gathered and at bedside    Educational materials: lakisha folder at bedside. Explained to patient, but did not review today in order to avoid overwhelming    Preparation for discharge: No discharge preparations started    Registered for supply samples? TBD    Discussed plan of care with: Patient and Nurse    Gab Nye RN, CWOCN

## 2022-05-02 NOTE — PLAN OF CARE
Goal Outcome Evaluation:  Pt more alert and oriented today, pleasant and cooperative, up w/1 assist GBW, voiding, bowel movement via ileostomy, pain controlled with oxycodone, dressing on HENNY CDI and minimal oozing, VS stable

## 2022-05-02 NOTE — PLAN OF CARE
Goal Outcome Evaluation:     Pt is A&O x4. VSS. NG inserted for nausea/emesis and abdominal distention. Xray done for verification of placement. Voiding adequately. Up with assistance of one with gait belt and walker. Pain managed with IV Dilaudid. Zofran given for nausea.

## 2022-05-02 NOTE — CARE PLAN
MD Notification    Notified Person: MD Lucas    Notified Person Name: Dr. Cole    Notification Date/Time:1/5/2022 @ 2245    Notification Interaction: paged and spoken over the phone    Purpose of Notification: vomiting,     Orders Received: yes    Comments: Order recived for decompression NG tube placement with ABd x-ray bedside RN notified

## 2022-05-02 NOTE — PROGRESS NOTES
[facility]    Medicine Progress Note - Hospitalist Service    Date of Admission:  4/28/2022    Assessment & Plan        Janet Figueroa is a 71 year old female with complex PMHx including rectal cancer, CAD s/p PCI, paroxysmal a-fib on chronic anticoagulation with apixaban, renal artery stenosis, CKD, prediabetes, and COPD who was admitted to the Colorectal Surgery service on 4/28/2022 for low anterior resection with diverting loop ileostomy. Hospitalist service was consulted for post-op medical management.       Rectal cancer s/p low anterior resection with diverting loop ileostomy on 4/28/2022  * Underwent procedure under GETA by Dr. Connolly, minimal EBL noted  - Post-op cares, pain control, and VTE prophylaxis as per CRS  - 5/2 N/V, NG with significant output noted, HENNY continued, IVF, lovenox - per surgery     Pyuria  Hematuria, Resolved  * UA ordered on 4/30 for hematuria which has resolved (suspect traumatic Gottlieb catheter placement which has since been removed); also showed large LE and 29 wbcs. Patient reported urgency, which seems to be chronic. No fever/leukocytosis noted   - Defer antibiotics for now, 4/30 urine culture showed no growth     Paroxysmal atrial fibrillation   * PTA: carvedilol 12.5 mg BID, apixaban 5 mg BID  - Holding apixaban; resume once cleared by CRS  - On prophylactic enoxaparin per CRS  - 5/2 - NPO per surgery, still with N/V - will have some coverage with metop 5 mg iv q6 for now     CAD, native heart, native vessels s/p PCI (RCA, 11/2019; LCx, 07/2008)  Chronic diastolic CHF  Essential hypertension  * PTA: carvedilol 12.5 mg BID, amlodipine 5 mg daily, irbesartan 300 mg daily, torsemide 20 mg BID, spironolactone 25 mg daily, atorvastatin 80 mg qhs  * Coronary angiogram (01/2020) showed no evidence for recurrent occlusive CAD  * TTE (11/2019) showed EF 55%, mild left atrial enlargement, mild MR, and trace TR  - PTA torsemide and spironolactone to resume when  "able to take PO  - 5/2 NPO and IVF per CRS, IV diuresis if/when needed  -- HOLD pta meds - prn hydralazine in place     CKD, stage IIIa  History of fibromuscular dysplasia of the right renal artery  - Creatinine stable ~1 range     COPD  * Chronic and stable on Incruse daily, albuterol prn  * Appears compensated without evidence of exacerbation     Prediabetes  Chronic and stable. No acute needs     Hypothyroidism  Chronic and stable on levothyroxine  - hold for now, ok to miss a day or two, can use IV if necessary in next couple days     Major recurrent depressive disorder with anxiety  Chronic and stable on escitalopram      Diet: NPO for Medical/Clinical Reasons Except for: Meds    DVT Prophylaxis: Defer to primary service (lovenox per CRS)  Gottlieb Catheter: Not present  Central Lines: None  Cardiac Monitoring: ACTIVE order. Indication: Tachyarrhythmias, acute (48 hours)  Code Status: Full Code      Disposition Plan   Expected Discharge: 05/02/2022     Anticipated discharge location: home with family    Delays:     Placement - Homecare            The patient's care was discussed with the Bedside Nurse and Patient.    Total encounter time 37 min with >50% spent in discussion and counseling with patient and RN regarding IV replacement regimen for PTA meds given NPO status, coordination of care with RN also.  Additional time spent on floor included chart review and physical exam.     Trey Mtz MD  Hospitalist Service  [facility]  Securely message with the Vocera Web Console (learn more here)  Text page via [employer] Paging/Directory         Clinically Significant Risk Factors Present on Admission             # Obesity: Estimated body mass index is 34.9 kg/m  as calculated from the following:    Height as of this encounter: 1.6 m (5' 3\").    Weight as of this encounter: 89.4 kg (197 lb).      ______________________________________________________________________    Interval History   Care " assumed today. Seen and examined midday. Discussed with RN. NPO, N/V earlier, slight improvement now. No fevers/chills or SOB reported.    Data reviewed today: I reviewed all medications, new labs and imaging results over the last 24 hours. I personally reviewed no images or EKG's today.    Physical Exam   Vital Signs: Temp: 98.4  F (36.9  C) Temp src: Oral BP: (!) 154/69 Pulse: 71   Resp: 17 SpO2: 94 % O2 Device: None (Room air)    Weight: 197 lbs 0 oz    Gen: NAD, pleasant  HEENT: EOMI, MMM  Resp: no crackles,  no wheezes, no increased work of resp  CV: S1S2 heard, reg rhythm, reg rate  Abdo: soft, a few BS heard  Ext: calves nontender, well perfused  Neuro: AAOx3, CN grossly intact, no facial asymmetry      Data   Recent Labs   Lab 05/02/22  0728 05/01/22  0656 04/30/22  0725 04/30/22  0616 04/29/22  0635 04/29/22  0157 04/28/22  1120   WBC 7.8  --   --   --   --   --  5.5   HGB 13.6  --  11.9  --  13.1  --  14.1   *  --   --   --   --   --  104*    264  --   --   --   --  319     --  139  --  139  --  138   POTASSIUM 3.9  --  3.8  --  4.1  --  4.1   CHLORIDE 104  --  111*  --  112*  --  110*   CO2 26  --  25  --  24  --  25   BUN 11  --  9  --  13  --  18   CR 1.03  --  0.90  --  1.00  --  1.18*   ANIONGAP 5  --  3  --  3  --  3   GISELLE 9.2  --  8.6  --  9.0  --  9.8   *  --  94 84 99   < > 108*    < > = values in this interval not displayed.     Recent Results (from the past 24 hour(s))   XR Abdomen Port 1 View    Narrative    EXAM: XR ABDOMEN PORT 1 VIEWS  LOCATION: [facility]  DATE/TIME: 5/1/2022 11:59 PM    INDICATION: Ng placement confirmation  COMPARISON: None.      Impression    IMPRESSION: The and G-tube distal tip is in the lower esophagus. I will recommend the NG tube should be advanced approximately 18 cm to allow the side port to be below the EG junction.    XR Abdomen Port 1 View    Narrative    EXAM: XR ABDOMEN PORT 1 VIEWS  LOCATION: [facility]  DATE/TIME: 5/2/2022 12:39 AM    INDICATION: Recheck the NG tube placement after advancing it  COMPARISON: 05/02/2022      Impression    IMPRESSION: NG tube and side-port within stomach. Dilated loops of bowel. Air along the left lateral abdomen presumably secondary to recent surgery.

## 2022-05-02 NOTE — PROGRESS NOTES
"Colon & Rectal Surgery Progress Note             Interval History:   Postop Day #: 4 LAR DLI  vomiting over night, NGT placed. 1.2 L output. Still with some nausea. 450 stoma output.  minimal pain. HENNY crt normal.                 Medications:   I have reviewed this patient's current medications               Physical Exam:   Blood pressure (!) 148/57, pulse 68, temperature 98.2  F (36.8  C), temperature source Oral, resp. rate 16, height 1.6 m (5' 3\"), weight 89.4 kg (197 lb), SpO2 93 %.    Intake/Output Summary (Last 24 hours) at 5/2/2022 0821  Last data filed at 5/2/2022 0600  Gross per 24 hour   Intake 15 ml   Output 3335 ml   Net -3320 ml     GEN:  alert  ABD:  Soft, round, incisions with bruising. Stoma pink with thin liquid output.          Data:        Lab Results   Component Value Date     05/02/2022    Lab Results   Component Value Date    CHLORIDE 104 05/02/2022    Lab Results   Component Value Date    BUN 11 05/02/2022      Lab Results   Component Value Date    POTASSIUM 3.9 05/02/2022    Lab Results   Component Value Date    CO2 26 05/02/2022    Lab Results   Component Value Date    CR 1.03 05/02/2022    CR 0.90 04/30/2022        Lab Results   Component Value Date    HGB 13.6 05/02/2022    HGB 11.9 04/30/2022     Lab Results   Component Value Date     05/02/2022     05/01/2022     Lab Results   Component Value Date    WBC 7.8 05/02/2022    WBC 5.5 04/28/2022            Assessment and Plan:   POD #4 LAR DI ileus  Nausea, vomiting, NGT with significant output.   NPO, IVF  Transition back to IV meds.   Continue HENNY  IVF  lovenox for now.     Lay Connolly MD  [facility]  Office Phone # [phone]    "

## 2022-05-03 ENCOUNTER — APPOINTMENT (OUTPATIENT)
Dept: ULTRASOUND IMAGING | Facility: CLINIC | Age: 72
DRG: 330 | End: 2022-05-03
Attending: PHYSICIAN ASSISTANT
Payer: MEDICARE

## 2022-05-03 ENCOUNTER — APPOINTMENT (OUTPATIENT)
Dept: GENERAL RADIOLOGY | Facility: CLINIC | Age: 72
DRG: 330 | End: 2022-05-03
Attending: PHYSICIAN ASSISTANT
Payer: MEDICARE

## 2022-05-03 LAB
ALBUMIN SERPL-MCNC: 3.2 G/DL (ref 3.4–5)
ALP SERPL-CCNC: 95 U/L (ref 40–150)
ALT SERPL W P-5'-P-CCNC: 57 U/L (ref 0–50)
ANION GAP SERPL CALCULATED.3IONS-SCNC: 6 MMOL/L (ref 3–14)
AST SERPL W P-5'-P-CCNC: 30 U/L (ref 0–45)
BILIRUB SERPL-MCNC: 0.8 MG/DL (ref 0.2–1.3)
BUN SERPL-MCNC: 18 MG/DL (ref 7–30)
CALCIUM SERPL-MCNC: 9.6 MG/DL (ref 8.5–10.1)
CHLORIDE BLD-SCNC: 102 MMOL/L (ref 94–109)
CO2 SERPL-SCNC: 25 MMOL/L (ref 20–32)
CREAT FLD-MCNC: 2.2 MG/DL
CREAT SERPL-MCNC: 2.26 MG/DL (ref 0.52–1.04)
CREATININE BODY FLUID SOURCE: NORMAL
ERYTHROCYTE [DISTWIDTH] IN BLOOD BY AUTOMATED COUNT: 13.4 % (ref 10–15)
GFR SERPL CREATININE-BSD FRML MDRD: 22 ML/MIN/1.73M2
GLUCOSE BLD-MCNC: 114 MG/DL (ref 70–99)
HCT VFR BLD AUTO: 44.4 % (ref 35–47)
HGB BLD-MCNC: 14.8 G/DL (ref 11.7–15.7)
MCH RBC QN AUTO: 34.6 PG (ref 26.5–33)
MCHC RBC AUTO-ENTMCNC: 33.3 G/DL (ref 31.5–36.5)
MCV RBC AUTO: 104 FL (ref 78–100)
PATH REPORT.COMMENTS IMP SPEC: NORMAL
PATH REPORT.FINAL DX SPEC: NORMAL
PATH REPORT.GROSS SPEC: NORMAL
PATH REPORT.MICROSCOPIC SPEC OTHER STN: NORMAL
PATH REPORT.RELEVANT HX SPEC: NORMAL
PATHOLOGY SYNOPTIC REPORT: NORMAL
PHOTO IMAGE: NORMAL
PLATELET # BLD AUTO: 389 10E3/UL (ref 150–450)
POTASSIUM BLD-SCNC: 4.2 MMOL/L (ref 3.4–5.3)
PROT SERPL-MCNC: 7.1 G/DL (ref 6.8–8.8)
RBC # BLD AUTO: 4.28 10E6/UL (ref 3.8–5.2)
SODIUM SERPL-SCNC: 133 MMOL/L (ref 133–144)
WBC # BLD AUTO: 7.4 10E3/UL (ref 4–11)

## 2022-05-03 PROCEDURE — 76770 US EXAM ABDO BACK WALL COMP: CPT

## 2022-05-03 PROCEDURE — 250N000011 HC RX IP 250 OP 636: Performed by: INTERNAL MEDICINE

## 2022-05-03 PROCEDURE — 120N000001 HC R&B MED SURG/OB

## 2022-05-03 PROCEDURE — 250N000011 HC RX IP 250 OP 636

## 2022-05-03 PROCEDURE — 258N000003 HC RX IP 258 OP 636: Performed by: PHYSICIAN ASSISTANT

## 2022-05-03 PROCEDURE — 250N000011 HC RX IP 250 OP 636: Performed by: COLON & RECTAL SURGERY

## 2022-05-03 PROCEDURE — 999N000065 XR ABDOMEN 1 VIEW

## 2022-05-03 PROCEDURE — 85027 COMPLETE CBC AUTOMATED: CPT | Performed by: COLON & RECTAL SURGERY

## 2022-05-03 PROCEDURE — 80053 COMPREHEN METABOLIC PANEL: CPT | Performed by: COLON & RECTAL SURGERY

## 2022-05-03 PROCEDURE — 250N000009 HC RX 250: Performed by: HOSPITALIST

## 2022-05-03 PROCEDURE — G0463 HOSPITAL OUTPT CLINIC VISIT: HCPCS

## 2022-05-03 PROCEDURE — 258N000003 HC RX IP 258 OP 636: Performed by: COLON & RECTAL SURGERY

## 2022-05-03 PROCEDURE — 36415 COLL VENOUS BLD VENIPUNCTURE: CPT | Performed by: COLON & RECTAL SURGERY

## 2022-05-03 PROCEDURE — 82570 ASSAY OF URINE CREATININE: CPT | Performed by: PHYSICIAN ASSISTANT

## 2022-05-03 PROCEDURE — 250N000013 HC RX MED GY IP 250 OP 250 PS 637: Performed by: COLON & RECTAL SURGERY

## 2022-05-03 PROCEDURE — 88309 TISSUE EXAM BY PATHOLOGIST: CPT | Mod: 26 | Performed by: PATHOLOGY

## 2022-05-03 RX ORDER — ENOXAPARIN SODIUM 100 MG/ML
30 INJECTION SUBCUTANEOUS EVERY 24 HOURS
Status: DISCONTINUED | OUTPATIENT
Start: 2022-05-03 | End: 2022-05-06

## 2022-05-03 RX ADMIN — HYDROMORPHONE HYDROCHLORIDE 0.2 MG: 0.2 INJECTION, SOLUTION INTRAMUSCULAR; INTRAVENOUS; SUBCUTANEOUS at 22:48

## 2022-05-03 RX ADMIN — ONDANSETRON 4 MG: 2 INJECTION INTRAMUSCULAR; INTRAVENOUS at 22:48

## 2022-05-03 RX ADMIN — POTASSIUM CHLORIDE, DEXTROSE MONOHYDRATE AND SODIUM CHLORIDE: 150; 5; 450 INJECTION, SOLUTION INTRAVENOUS at 02:07

## 2022-05-03 RX ADMIN — UMECLIDINIUM 1 PUFF: 62.5 AEROSOL, POWDER ORAL at 10:29

## 2022-05-03 RX ADMIN — ESCITALOPRAM OXALATE 10 MG: 10 TABLET ORAL at 10:28

## 2022-05-03 RX ADMIN — PROCHLORPERAZINE EDISYLATE 5 MG: 5 INJECTION INTRAMUSCULAR; INTRAVENOUS at 07:01

## 2022-05-03 RX ADMIN — ENOXAPARIN SODIUM 30 MG: 30 INJECTION SUBCUTANEOUS at 17:32

## 2022-05-03 RX ADMIN — HYDROMORPHONE HYDROCHLORIDE 0.2 MG: 0.2 INJECTION, SOLUTION INTRAMUSCULAR; INTRAVENOUS; SUBCUTANEOUS at 02:12

## 2022-05-03 RX ADMIN — METOPROLOL TARTRATE 5 MG: 5 INJECTION INTRAVENOUS at 17:32

## 2022-05-03 RX ADMIN — SODIUM CHLORIDE 500 ML: 9 INJECTION, SOLUTION INTRAVENOUS at 15:45

## 2022-05-03 RX ADMIN — ONDANSETRON 4 MG: 2 INJECTION INTRAMUSCULAR; INTRAVENOUS at 10:34

## 2022-05-03 RX ADMIN — POTASSIUM CHLORIDE, DEXTROSE MONOHYDRATE AND SODIUM CHLORIDE: 150; 5; 450 INJECTION, SOLUTION INTRAVENOUS at 12:43

## 2022-05-03 RX ADMIN — SODIUM CHLORIDE 500 ML: 9 INJECTION, SOLUTION INTRAVENOUS at 10:26

## 2022-05-03 RX ADMIN — METOPROLOL TARTRATE 5 MG: 5 INJECTION INTRAVENOUS at 10:28

## 2022-05-03 RX ADMIN — METOPROLOL TARTRATE 5 MG: 5 INJECTION INTRAVENOUS at 21:57

## 2022-05-03 RX ADMIN — ONDANSETRON 4 MG: 2 INJECTION INTRAMUSCULAR; INTRAVENOUS at 02:10

## 2022-05-03 RX ADMIN — METOPROLOL TARTRATE 5 MG: 5 INJECTION INTRAVENOUS at 02:08

## 2022-05-03 ASSESSMENT — ACTIVITIES OF DAILY LIVING (ADL)
ADLS_ACUITY_SCORE: 8

## 2022-05-03 NOTE — PROGRESS NOTES
[facility]    Medicine Progress Note - Hospitalist Service    Date of Admission:  4/28/2022    Assessment & Plan        Janet Figueroa is a 71 year old female with complex PMHx including rectal cancer, CAD s/p PCI, paroxysmal a-fib on chronic anticoagulation with apixaban, renal artery stenosis, CKD, prediabetes, and COPD who was admitted to the Colorectal Surgery service on 4/28/2022 for low anterior resection with diverting loop ileostomy. Hospitalist service was consulted for post-op medical management.        Rectal cancer s/p low anterior resection with diverting loop ileostomy on 4/28/2022  * Underwent procedure under GETA by Dr. Connolly, minimal EBL noted  - Post-op cares, pain control, and VTE prophylaxis as per CRS  - 5/2 N/V, NG with significant output noted, HENNY continued, IVF, lovenox - per surgery  - 5/3 NG repositioned per surgery, cont IVF - diet remains NPO per surgery     Pyuria  Hematuria, Resolved  * UA ordered on 4/30 for hematuria which has resolved (suspect traumatic Gottlieb catheter placement which has since been removed); also showed large LE and 29 wbcs. Patient reported urgency, which seems to be chronic. No fever/leukocytosis noted   - Defer antibiotics for now, 4/30 urine culture showed no growth     Paroxysmal atrial fibrillation   * PTA: carvedilol 12.5 mg BID, apixaban 5 mg BID  - Holding apixaban; resume once cleared by CRS  - On prophylactic enoxaparin per CRS  - 5/2 - NPO per surgery, still with N/V - will have some coverage with metop 5 mg iv q6 for now     CAD, native heart, native vessels s/p PCI (RCA, 11/2019; LCx, 07/2008)  Chronic diastolic CHF  Essential hypertension  * PTA: carvedilol 12.5 mg BID, amlodipine 5 mg daily, irbesartan 300 mg daily, torsemide 20 mg BID, spironolactone 25 mg daily, atorvastatin 80 mg qhs  * Coronary angiogram (01/2020) showed no evidence for recurrent occlusive CAD  * TTE (11/2019) showed EF 55%, mild left atrial  enlargement, mild MR, and trace TR  - PTA torsemide and spironolactone to resume when able to take PO  - 5/2 NPO and IVF per CRS  -- HOLD pta meds - prn hydralazine in place     Acute kidney injury  CKD, stage IIIa  History of fibromuscular dysplasia of the right renal artery  - Creatinine stable ~1 range  - 5/3 - creat 2.26, likely due to decreased PO intake and vomiting  - IVF boluses given per primary, continue mIVF  - BMP in AM    COPD  * Chronic and stable on Incruse daily, albuterol prn  * Appears compensated without evidence of exacerbation     Prediabetes  Chronic and stable. No acute needs     Hypothyroidism  Chronic and stable on levothyroxine  - hold for now, ok to miss a day or two, can use IV if necessary in next couple days     Major recurrent depressive disorder with anxiety  Chronic and stable on escitalopram        Diet: NPO for Medical/Clinical Reasons Except for: Meds    DVT Prophylaxis: Defer to primary service  Gottlieb Catheter: Not present  Central Lines: None  Cardiac Monitoring: ACTIVE order. Indication: Tachyarrhythmias, acute (48 hours)  Code Status: Full Code      Disposition Plan   Expected Discharge: 05/06/2022     Anticipated discharge location: home with family    Delays:     Placement - Homecare  Other (Add Comment)            The patient's care was discussed with the Bedside Nurse.    Trey Mtz MD  Hospitalist Service  [facility]  Securely message with the Vocera Web Console (learn more here)  Text page via Net Element Paging/Directory         Clinically Significant Risk Factors Present on Admission                    ______________________________________________________________________    Interval History   Discussed with RN.  No new concerns for hospitalist service. Chart check shows VSS despite holding oral meds and some replacement in IV form. NPO per primary. KAMRON noted, ivf boluses given per primary which I concur with as tolerated. Follow BMP in  AM.    Data reviewed today: I reviewed all medications, new labs and imaging results over the last 24 hours. I personally reviewed no images or EKG's today.    Physical Exam   Vital Signs: Temp: 98.4  F (36.9  C) Temp src: Oral BP: 123/65 Pulse: 67   Resp: 18 SpO2: 94 % O2 Device: None (Room air)    Weight: 194 lbs 14.19 oz      Data   Recent Labs   Lab 05/03/22  1113 05/02/22  0728 05/01/22  0656 04/30/22  0725 04/29/22  0157 04/28/22  1120   WBC 7.4 7.8  --   --   --  5.5   HGB 14.8 13.6  --  11.9   < > 14.1   * 103*  --   --   --  104*    296 264  --   --  319    135  --  139   < > 138   POTASSIUM 4.2 3.9  --  3.8   < > 4.1   CHLORIDE 102 104  --  111*   < > 110*   CO2 25 26  --  25   < > 25   BUN 18 11  --  9   < > 18   CR 2.26* 1.03  --  0.90   < > 1.18*   ANIONGAP 6 5  --  3   < > 3   GISELLE 9.6 9.2  --  8.6   < > 9.8   * 113*  --  94   < > 108*   ALBUMIN 3.2*  --   --   --   --   --    PROTTOTAL 7.1  --   --   --   --   --    BILITOTAL 0.8  --   --   --   --   --    ALKPHOS 95  --   --   --   --   --    ALT 57*  --   --   --   --   --    AST 30  --   --   --   --   --     < > = values in this interval not displayed.     Recent Results (from the past 24 hour(s))   XR Abdomen 1 View    Narrative    ABDOMEN ONE VIEW  5/3/2022 11:31 AM    HISTORY: Check NGT positioning.    COMPARISON: 5/2/2022      Impression    IMPRESSION: NG tube has been pulled back to the upper esophagus. Mild  gaseous distention of the stomach, large and small bowel noted in the  upper abdomen.

## 2022-05-03 NOTE — PROGRESS NOTES
"[facility] Nurse Inpatient Ostomy Assessment     Assessment of new loop Ileostomy     Surgery date:  4/28/22  Surgeon:  Dr. Lay Connolly  Procedure:  Low anterior resection with diverting loop ileostomy  Related diagnosis:  Low rectal cancer    [facility] Assessment & Physical Exam:        Current status: Pt in bed on her side with NG draining and pt having frequent bouts of vomitting      Date of last photo: 4/29/22          Stoma location: RUQ    Stoma size: 1 1/8\"     Stoma appearance: pink-red, round and edematous     Mucocutaneous junction:  Intact     Peristomal complication(s): none , moderate bruising from 2 to 9 O'clock    Abdominal assessment: distended    Surgical site(s): open to air    NG still in place? No    Output: brown and liquid     Pain: Cramping    Is patient still on a PCA? 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 Pt emptying pouch independently.      Psychosocial: Patient is pleasant and interactive asking appropriate quesitons      WO Plan:         Pouching product plan: Tolleson 2 piece 57mm flat with ring and lock and roll    Comments: monitor for abdominal changes that may require changes to pouching    Frequency of pouch changes: 2-3x weekly      Pt support system on discharge: patient reports speaking to her son on the phone. But not discussed today    WO recommend home care?  yes      WO follow-up plan: daily M-F      Objective Data:       Patient history according to medical record: Janet Figueroa is a 71 year old female admitted on 4/28/2022. She has a past medical history of hypertension, mild COPD, CAD s/p PCI, dyslipidemia, paroxysmal A. Fib on eliquis, CKD, stage IIIb, " renal artery stenosis, hypothyroidism, depression/anxiety, prediabetes, GERD, renal artery stenosis, who is status post low anterior resection with diverting loop ileostomy due to rectal cancer.      Current Diet/Nutrition:   Orders Placed This Encounter      NPO for Medical/Clinical Reasons Except for: Meds       Output:  I/O last 3 completed shifts:  In: 90 [NG/GT:90]  Out: 2450 [Urine:325; Emesis/NG output:1480; Drains:320; Stool:325]      Labs:   Recent Labs   Lab 05/02/22  0728   HGB 13.6   WBC 7.8         Star Risk Assessment:   Sensory Perception: 4-->no impairment  Moisture: 4-->rarely moist  Activity: 3-->walks occasionally  Mobility: 3-->slightly limited  Nutrition: 3-->adequate  Friction and Shear: 2-->potential problem  Star Score: 19      WOC Interventions:       Patient's chart evaluated    Focus of today's visit: refitting of appliance, pouch change demonstration , verbal instruction , diet and hydration , pouch emptying and output measurement     Pouch changed 4/29, 5/3    Participant(s) in teaching session today: patient  and nurse    Change made with ostomy management today: No    Supplies: Gathered and at bedside    Educational materials: lakisha folder at bedside.    Preparation for discharge: No discharge preparations started    Registered for supply samples? TBD    Discussed plan of care with: Patient and Nurse    Gab Nye RN, CWOCN

## 2022-05-03 NOTE — PLAN OF CARE
Goal Outcome Evaluation:    Pt in for bowel resection  with diverting loop ileostomy. A&O x 4. VSS on RA. Assist x1, gait belt, and walker. Pt on tele- bradycardic. Had nausea and vomiting throughout the night, managed with zofran and compazine. Pt states feeling better.NG in place, had very tenacious output (brown). Had to flush throughout the night.  Bowel sounds audible, liquid stool and gas in ileostomy. HENNY with serosanguinous drainage. Incisions with liquid bandage ecchymotic. Pt has voided very small amounts throughout the night, bladder scan and last amount was 25 mL. Continue to monitor.

## 2022-05-03 NOTE — PLAN OF CARE
Goal Outcome Evaluation:    [id number]: A&Ox4, VSS except HTN, pt NPO due to NG tube. Up 1 assist, gait belt, and walker. Pt on tele. IV dilaudid given for pain 8/10. Upon arrival to room 2210, pt complained of feeling full and nauseated with emesis. NG tube placement checked and irrigated with 800 ml return of thick brown/green output. Pt states feeling better. Zofran given to help with nausea. Bowel sounds audible, liquid stool and gas in ileostomy. HENNY with serosanguinous drainage. Incisions with liquid bandage ecchymotic. Pt has not voided since being transferred. Bladder scanned for 190.

## 2022-05-03 NOTE — PROGRESS NOTES
COLON & RECTAL SURGERY  PROGRESS NOTE    May 3, 2022  Post-op Day #5    SUBJECTIVE:  Nausea and vomiting this am, despite NGT. Having pressure in suprapubic region, feels like she needs to urinate. Only voiding small amounts and bladder scans negative.     OBJECTIVE:  Temp:  [98.3  F (36.8  C)-98.4  F (36.9  C)] 98.4  F (36.9  C)  Pulse:  [67-79] 67  Resp:  [17-18] 18  BP: (123-159)/(56-74) 123/65  SpO2:  [94 %-96 %] 94 %    Intake/Output Summary (Last 24 hours) at 5/3/2022 0838  Last data filed at 5/3/2022 0705  Gross per 24 hour   Intake 90 ml   Output 3175 ml   Net -3085 ml       GENERAL:  Awake, alert, no acute distress  HEAD: Normocephalic atraumatic  SCLERA: Anicteric  EXTREMITIES: Warm and well perfused  ABDOMEN:  Soft, appropriately tender, distended. No guarding, rigidity, or peritoneal signs. Stoma pink and protruding with small amount of stool present.   INCISION:  C/d/i    LABS:  Lab Results   Component Value Date    WBC 7.8 05/02/2022     Lab Results   Component Value Date    HGB 13.6 05/02/2022     Lab Results   Component Value Date    HCT 41.1 05/02/2022     Lab Results   Component Value Date     05/02/2022     Last Basic Metabolic Panel:  Lab Results   Component Value Date     05/02/2022      Lab Results   Component Value Date    POTASSIUM 3.9 05/02/2022     Lab Results   Component Value Date    CHLORIDE 104 05/02/2022     Lab Results   Component Value Date    GISELLE 9.2 05/02/2022     Lab Results   Component Value Date    CO2 26 05/02/2022     Lab Results   Component Value Date    BUN 11 05/02/2022     Lab Results   Component Value Date    CR 1.03 05/02/2022     Lab Results   Component Value Date     05/02/2022       ASSESSMENT/PLAN: 72 yo F s/p robotic low anterior resection with diverting loop ileostomy. AVSS. Will get abd xr to evaluate NGT placement.     1. NPO diet  2. IV PRN pain meds  3. Continug IVF, will give 500mL bolus  4. Straight cath x1  5. OOB, ambulate  6. Continue  HENNY  7. Lovenox for ppx  8. Await path    Discussed with Dr. Connolly.     ADDENDUM 1:52: Noted patient's increase in Cr from 1.03 -->2.26. Nursing repositioned NGT with >1L output.   Plan: Replace Gottlieb for close monitoring of UOP. Recheck fluid Cr from HENNY drain. NS bolus. Renal ultrasound to r/o hydronephrosis. Discussed with RN.     For questions/paging, please contact the CRS office at [phone].    Olga Rangel PA-C  Colorectal Physician Assistant    [facility]  [address]  [place] [address]  T: [phone]  F: [phone]        Colon and Rectal Surgery Staff  I performed a history and physical examination of the patient and discussed their management with the physician assistant. I reviewed the physician assistants note and agree with the documented findings and plan of care.     Nausea and emesis this am.  AXR with NGT in esophagus.  Nurses attempting to reposition.    abd soft, distended  Stoma pink, viable with liquid output in appliance    Plan  Cont NGT, reposition and check AXR  Cont IVFs  IV pain meds  DVT ppx    Asmita Oden MD, FACS    [facility]  [address]  [place] [address]  T: [phone]  F: [phone]

## 2022-05-03 NOTE — PROGRESS NOTES
Care Management Follow Up    Length of Stay (days): 5    Expected Discharge Date: 05/06/2022     Concerns to be Addressed:       Patient plan of care discussed at interdisciplinary rounds: No    Anticipated Discharge Disposition: Home Care     Anticipated Discharge Services:    Anticipated Discharge DME:      Patient/family educated on Medicare website which has current facility and service quality ratings: yes  Education Provided on the Discharge Plan:    Patient/Family in Agreement with the Plan: yes    Referrals Placed by CM/SW: Homecare  Private pay costs discussed: Not applicable    Additional Information:  Writer met with patient on 4N on 5/2.  Today she has been transferred to Gen Surg.  Writer confirmed with [facility] that they can take the patient for [facility].  They plan to start on Sunday 5/8.  Orders can be faxed to [phone]  Phone number for Yulisa Lee is [phone]  H & P faxed to yulisa today. CHINEDU Saravia RN

## 2022-05-04 LAB
ANION GAP SERPL CALCULATED.3IONS-SCNC: 5 MMOL/L (ref 3–14)
BUN SERPL-MCNC: 24 MG/DL (ref 7–30)
CALCIUM SERPL-MCNC: 8.7 MG/DL (ref 8.5–10.1)
CHLORIDE BLD-SCNC: 102 MMOL/L (ref 94–109)
CO2 SERPL-SCNC: 26 MMOL/L (ref 20–32)
CREAT SERPL-MCNC: 2.68 MG/DL (ref 0.52–1.04)
GFR SERPL CREATININE-BSD FRML MDRD: 18 ML/MIN/1.73M2
GLUCOSE BLD-MCNC: 116 MG/DL (ref 70–99)
GLUCOSE BLDC GLUCOMTR-MCNC: 113 MG/DL (ref 70–99)
GLUCOSE BLDC GLUCOMTR-MCNC: 81 MG/DL (ref 70–99)
MAGNESIUM SERPL-MCNC: 2.2 MG/DL (ref 1.6–2.3)
MAGNESIUM SERPL-MCNC: 2.3 MG/DL (ref 1.6–2.3)
PHOSPHATE SERPL-MCNC: 3.4 MG/DL (ref 2.5–4.5)
PHOSPHATE SERPL-MCNC: 4 MG/DL (ref 2.5–4.5)
PLATELET # BLD AUTO: 333 10E3/UL (ref 150–450)
POTASSIUM BLD-SCNC: 4.2 MMOL/L (ref 3.4–5.3)
SODIUM SERPL-SCNC: 133 MMOL/L (ref 133–144)
TRIGL SERPL-MCNC: 149 MG/DL

## 2022-05-04 PROCEDURE — G0463 HOSPITAL OUTPT CLINIC VISIT: HCPCS

## 2022-05-04 PROCEDURE — 250N000011 HC RX IP 250 OP 636

## 2022-05-04 PROCEDURE — 36415 COLL VENOUS BLD VENIPUNCTURE: CPT

## 2022-05-04 PROCEDURE — 85049 AUTOMATED PLATELET COUNT: CPT | Performed by: COLON & RECTAL SURGERY

## 2022-05-04 PROCEDURE — 3E0436Z INTRODUCTION OF NUTRITIONAL SUBSTANCE INTO CENTRAL VEIN, PERCUTANEOUS APPROACH: ICD-10-PCS | Performed by: COLON & RECTAL SURGERY

## 2022-05-04 PROCEDURE — 80048 BASIC METABOLIC PNL TOTAL CA: CPT | Performed by: HOSPITALIST

## 2022-05-04 PROCEDURE — 250N000009 HC RX 250

## 2022-05-04 PROCEDURE — 250N000013 HC RX MED GY IP 250 OP 250 PS 637: Performed by: HOSPITALIST

## 2022-05-04 PROCEDURE — 36415 COLL VENOUS BLD VENIPUNCTURE: CPT | Performed by: HOSPITALIST

## 2022-05-04 PROCEDURE — 83735 ASSAY OF MAGNESIUM: CPT

## 2022-05-04 PROCEDURE — 272N000451 HC KIT SHRLOCK 5FR POWER PICC DOUBLE LUMEN

## 2022-05-04 PROCEDURE — 250N000009 HC RX 250: Performed by: HOSPITALIST

## 2022-05-04 PROCEDURE — 84478 ASSAY OF TRIGLYCERIDES: CPT | Performed by: PHYSICIAN ASSISTANT

## 2022-05-04 PROCEDURE — 250N000011 HC RX IP 250 OP 636: Performed by: COLON & RECTAL SURGERY

## 2022-05-04 PROCEDURE — 250N000013 HC RX MED GY IP 250 OP 250 PS 637: Performed by: COLON & RECTAL SURGERY

## 2022-05-04 PROCEDURE — 258N000003 HC RX IP 258 OP 636: Performed by: STUDENT IN AN ORGANIZED HEALTH CARE EDUCATION/TRAINING PROGRAM

## 2022-05-04 PROCEDURE — 84100 ASSAY OF PHOSPHORUS: CPT

## 2022-05-04 PROCEDURE — 258N000003 HC RX IP 258 OP 636: Performed by: COLON & RECTAL SURGERY

## 2022-05-04 PROCEDURE — 36569 INSJ PICC 5 YR+ W/O IMAGING: CPT

## 2022-05-04 PROCEDURE — 83735 ASSAY OF MAGNESIUM: CPT | Performed by: PHYSICIAN ASSISTANT

## 2022-05-04 PROCEDURE — 120N000001 HC R&B MED SURG/OB

## 2022-05-04 PROCEDURE — 84100 ASSAY OF PHOSPHORUS: CPT | Performed by: PHYSICIAN ASSISTANT

## 2022-05-04 PROCEDURE — 258N000003 HC RX IP 258 OP 636: Performed by: PHYSICIAN ASSISTANT

## 2022-05-04 RX ORDER — SODIUM CHLORIDE 9 MG/ML
INJECTION, SOLUTION INTRAVENOUS EVERY 4 HOURS
Status: DISCONTINUED | OUTPATIENT
Start: 2022-05-04 | End: 2022-05-17 | Stop reason: HOSPADM

## 2022-05-04 RX ORDER — NICOTINE POLACRILEX 4 MG
15-30 LOZENGE BUCCAL
Status: DISCONTINUED | OUTPATIENT
Start: 2022-05-04 | End: 2022-05-17 | Stop reason: HOSPADM

## 2022-05-04 RX ORDER — DEXTROSE MONOHYDRATE 25 G/50ML
25-50 INJECTION, SOLUTION INTRAVENOUS
Status: DISCONTINUED | OUTPATIENT
Start: 2022-05-04 | End: 2022-05-17 | Stop reason: HOSPADM

## 2022-05-04 RX ORDER — SODIUM CHLORIDE 9 MG/ML
INJECTION, SOLUTION INTRAVENOUS EVERY 4 HOURS
Status: DISCONTINUED | OUTPATIENT
Start: 2022-05-04 | End: 2022-05-04

## 2022-05-04 RX ORDER — LEVOTHYROXINE SODIUM ANHYDROUS 100 UG/5ML
19 INJECTION, POWDER, LYOPHILIZED, FOR SOLUTION INTRAVENOUS DAILY
Status: DISCONTINUED | OUTPATIENT
Start: 2022-05-05 | End: 2022-05-07

## 2022-05-04 RX ORDER — DEXTROSE MONOHYDRATE 100 MG/ML
INJECTION, SOLUTION INTRAVENOUS CONTINUOUS PRN
Status: DISCONTINUED | OUTPATIENT
Start: 2022-05-04 | End: 2022-05-17 | Stop reason: HOSPADM

## 2022-05-04 RX ADMIN — SODIUM CHLORIDE 1300 ML: 9 INJECTION, SOLUTION INTRAVENOUS at 11:48

## 2022-05-04 RX ADMIN — ESCITALOPRAM OXALATE 10 MG: 10 TABLET ORAL at 08:26

## 2022-05-04 RX ADMIN — CALCIUM GLUCONATE: 98 INJECTION, SOLUTION INTRAVENOUS at 21:36

## 2022-05-04 RX ADMIN — SODIUM CHLORIDE 500 ML: 9 INJECTION, SOLUTION INTRAVENOUS at 08:25

## 2022-05-04 RX ADMIN — METOPROLOL TARTRATE 5 MG: 5 INJECTION INTRAVENOUS at 21:37

## 2022-05-04 RX ADMIN — I.V. FAT EMULSION 250 ML: 20 EMULSION INTRAVENOUS at 21:36

## 2022-05-04 RX ADMIN — ONDANSETRON 4 MG: 2 INJECTION INTRAMUSCULAR; INTRAVENOUS at 14:27

## 2022-05-04 RX ADMIN — SODIUM CHLORIDE 500 ML: 9 INJECTION, SOLUTION INTRAVENOUS at 21:35

## 2022-05-04 RX ADMIN — SODIUM CHLORIDE, PRESERVATIVE FREE: 5 INJECTION INTRAVENOUS at 16:37

## 2022-05-04 RX ADMIN — OXYCODONE HYDROCHLORIDE 2.5 MG: 5 TABLET ORAL at 14:33

## 2022-05-04 RX ADMIN — POTASSIUM CHLORIDE, DEXTROSE MONOHYDRATE AND SODIUM CHLORIDE: 150; 5; 450 INJECTION, SOLUTION INTRAVENOUS at 01:10

## 2022-05-04 RX ADMIN — Medication 1 ML: at 04:46

## 2022-05-04 RX ADMIN — SODIUM CHLORIDE, PRESERVATIVE FREE: 5 INJECTION INTRAVENOUS at 22:28

## 2022-05-04 RX ADMIN — METOPROLOL TARTRATE 5 MG: 5 INJECTION INTRAVENOUS at 10:04

## 2022-05-04 RX ADMIN — ENOXAPARIN SODIUM 30 MG: 30 INJECTION SUBCUTANEOUS at 16:43

## 2022-05-04 RX ADMIN — METOPROLOL TARTRATE 5 MG: 5 INJECTION INTRAVENOUS at 04:30

## 2022-05-04 RX ADMIN — METOPROLOL TARTRATE 5 MG: 5 INJECTION INTRAVENOUS at 16:29

## 2022-05-04 RX ADMIN — UMECLIDINIUM 1 PUFF: 62.5 AEROSOL, POWDER ORAL at 08:26

## 2022-05-04 ASSESSMENT — ACTIVITIES OF DAILY LIVING (ADL)
ADLS_ACUITY_SCORE: 8

## 2022-05-04 NOTE — PLAN OF CARE
Goal Outcome Evaluation:       Pt in for Bowel resection w/ diverting loop ileostomy. A&Ox4, VSS except HTN on RA.  NPO due to NG. Good NG output- dark green output. Up 1 assist, gait belt, and walker. Tele- sinus lana. Some nausea and vomiting, administered Zofran.  Audible bowel sounds, minimal liquid stool in ileostomy . Minimal urine output. HENNY with serosanguinous drainage. Pt slept well, and stated her nausea and pain was better this morning. Continue to monitor.

## 2022-05-04 NOTE — CONSULTS
"CLINICAL NUTRITION SERVICES  -  ASSESSMENT NOTE      Recommendations Ordered by Registered Dietitian (RD): Step #1:  Run TPN at 75 mL/hr, 90 g AA/day, 180 g Dextrose/day + Lipid 250 mL 20% 2x per week= 1115 kcal (18 kcal/kg), 90 g protein (1.5 g/kg), 180 g CHO (GIR 2)  Decrease D5 IVF to 25 mL/hr= 30 g CHO, 102 kcal   Total = 1217 kcal (20 kcal/kg), 210 g CHO (GIR 2.4)    Step #2:  After 24 hours if K, Mg, and Phos acceptable, increase to goal TPN   Run at 75 mL/hr, 90 g AA/day, 270 g Dextrose/day + Lipid 250 mL 20% 2x per week= 1421 kcal (23 kcal/kg), 90 g protein (1.5 g/kg), 270 g CHO (GIR 3)  Total with D5 IVF = 1523 kcal (25 kcal/kg), 300 g CHO (GIR 3.4)   Malnutrition: % Weight Loss:  None noted  % Intake:  </= 50% for >/= 5 days (severe malnutrition)  Subcutaneous Fat Loss:  None observed  Muscle Loss:  None observed  Fluid Retention:  None noted    Malnutrition Diagnosis: Patient does not meet two of the above criteria necessary for diagnosing malnutrition        REASON FOR ASSESSMENT  Janet Figueroa is a 71 year old female seen by Registered Dietitian for Pharmacy/Nutrition to Start and Manage PN    Patient admitted with rectal CA, s/p low anterior resection with diverting loop ileostomy on 4/28.  She is now with post-op ileus (high NG output) - plans for TPN start tonight.       NUTRITION HISTORY  - Information obtained from patient.  She notes that at baseline she has a good appetite and intake.  \"It's normally like - 'do you really need that cookie'?\"  - She was seen by my colleague earlier in stay for low fiber diet education (4/29) and the following history was obtained at that time:  Information obtained from patient  She is having a difficult time remembering things in her current post-op state so provides little hx  Follows a regular diet at home, skips breakfast and consumes only lunch and dinner   Meals vary but may typically include chicken, fish, whole grain breads, homemade soups, spaghetti, " "mac and cheese  No known food allergies, but patient notes she often has difficultly digesting ice cream   Uses Mrs Grullon herb seasoning in place of salt.      CURRENT NUTRITION ORDERS  Diet Order:     NPO     Current Intake/Tolerance:  Patient notes that she has done some CL and FL since admit but really has only had the equivalent to one full solid meal since admit (day #6/7).      NUTRITION FOCUSED PHYSICAL ASSESSMENT FOR DIAGNOSING MALNUTRITION)  Yes               Observed:    No nutrition-related physical findings observed    Obtained from Chart/Interdisciplinary Team:  None     ANTHROPOMETRICS  Height: 5' 3\"  Weight: 88.4 kg (195#)(5/3)  Body mass index is 34.52 kg/m   Weight Status:  Obesity Grade I BMI 30-34.9  IBW: 52.3 kg   % IBW: 169%  Weight History: No significant weight loss noted per patient     LABS  BUN 24 (NL), Cr 2.68 (H)     MEDICATIONS  D5 at 100 mL/HR= 120 g CHO, 408 kcal       ASSESSED NUTRITION NEEDS PER APPROVED PRACTICE GUIDELINES:    Dosing Weight 61.3 kg   Estimated Energy Needs: 9813-3191 kcals (25-30 Kcal/Kg)  Justification: obese  Estimated Protein Needs: 75-90 grams protein (1.2-1.5 g pro/Kg)  Justification: post-op and hypercatabolism with acute illness  Estimated Fluid Needs: 0730-8403 mL (1 mL/Kcal)  Justification: maintenance    MALNUTRITION:  % Weight Loss:  None noted  % Intake:  </= 50% for >/= 5 days (severe malnutrition)  Subcutaneous Fat Loss:  None observed  Muscle Loss:  None observed  Fluid Retention:  None noted    Malnutrition Diagnosis: Patient does not meet two of the above criteria necessary for diagnosing malnutrition    NUTRITION DIAGNOSIS:  Inadequate protein-energy intake related to NPO with plans to start TPN tonight as evidenced by meeting 0% protein, 25% energy needs from D5 IVF       NUTRITION INTERVENTIONS  Recommendations / Nutrition Prescription  Step #1:  Run TPN at 75 mL/hr, 90 g AA/day, 180 g Dextrose/day + Lipid 250 mL 20% 2x per week= 1115 kcal (18 " kcal/kg), 90 g protein (1.5 g/kg), 180 g CHO (GIR 2)  Decrease D5 IVF to 25 mL/hr= 30 g CHO, 102 kcal   Total = 1217 kcal (20 kcal/kg), 210 g CHO (GIR 2.4)    Step #2:  After 24 hours if K, Mg, and Phos acceptable, increase to goal TPN   Run at 75 mL/hr, 90 g AA/day, 270 g Dextrose/day + Lipid 250 mL 20% 2x per week= 1421 kcal (23 kcal/kg), 90 g protein (1.5 g/kg), 270 g CHO (GIR 3)  Total with D5 IVF = 1523 kcal (25 kcal/kg), 300 g CHO (GIR 3.4)    Implementation  Nutrition education: Not appropriate at this time due to patient condition  PN Composition, PN Schedule:  Above TPN orders written   Collaboration and Referral of Nutrition care:  Discussed TPN start with Pharmacist.     Nutrition Goals  TPN/Lipid + D5 IVF will meet % needs while NPO     MONITORING AND EVALUATION:  Progress towards goals will be monitored and evaluated per protocol and Practice Guidelines    Kellie Quintero RD, LD, CNSC   Clinical Dietitian - [facility]

## 2022-05-04 NOTE — PROGRESS NOTES
[facility]    Medicine Progress Note - Hospitalist Service    Date of Admission:  4/28/2022    Assessment & Plan        Janet Figueroa is a 71 year old female with complex PMHx including rectal cancer, CAD s/p PCI, paroxysmal a-fib on chronic anticoagulation with apixaban, renal artery stenosis, CKD, prediabetes, and COPD who was admitted to the Colorectal Surgery service on 4/28/2022 for low anterior resection with diverting loop ileostomy. Hospitalist service was consulted for post-op medical management.        Rectal cancer s/p low anterior resection with diverting loop ileostomy on 4/28/2022  * Underwent procedure under GETA by Dr. Connolly, minimal EBL noted  - Post-op cares, pain control, and VTE prophylaxis as per CRS  - 5/2 N/V, NG with significant output noted, HENNY continued, IVF, lovenox - per surgery  - 5/3 NG repositioned per surgery, cont IVF - diet remains NPO per surgery  - 5/4 over 4 L out of NG - primary increasing fluids as below     Acute kidney injury  CKD, stage IIIa  History of fibromuscular dysplasia of the right renal artery  - Creatinine stable ~1 range  - 5/3 - creat 2.26, likely due to decreased PO intake and vomiting  - 5/4 - creat 2.6, ongoing NG losses, BUN up, likely prerenal  - IVF boluses given per primary  - consider nephro consult if creat not leveling off/improving tmrw  - BMP in AM    Pyuria  Hematuria, Resolved  * UA ordered on 4/30 for hematuria which has resolved (suspect traumatic Gottlieb catheter placement which has since been removed); also showed large LE and 29 wbcs. Patient reported urgency, which seems to be chronic. No fever/leukocytosis noted   - Defer antibiotics for now, 4/30 urine culture showed no growth     Paroxysmal atrial fibrillation   * PTA: carvedilol 12.5 mg BID, apixaban 5 mg BID  - Holding apixaban; resume once cleared by CRS  - On prophylactic enoxaparin per CRS  - 5/2 - NPO per surgery, still with N/V - will have some coverage  with metop 5 mg iv q6 for now     CAD, native heart, native vessels s/p PCI (RCA, 11/2019; LCx, 07/2008)  Chronic diastolic CHF  Essential hypertension  * PTA: carvedilol 12.5 mg BID, amlodipine 5 mg daily, irbesartan 300 mg daily, torsemide 20 mg BID, spironolactone 25 mg daily, atorvastatin 80 mg qhs  * Coronary angiogram (01/2020) showed no evidence for recurrent occlusive CAD  * TTE (11/2019) showed EF 55%, mild left atrial enlargement, mild MR, and trace TR  - PTA torsemide and spironolactone to resume when able to take PO  - 5/2 NPO and IVF per CRS  -- HOLD pta meds - prn hydralazine in place     COPD  * Chronic and stable on Incruse daily, albuterol prn  * Appears compensated without evidence of exacerbation     Prediabetes  Chronic and stable. No acute needs     Hypothyroidism  Chronic and stable on levothyroxine  - hold for now, ok to miss a day or two, can use IV if necessary in next couple days     Major recurrent depressive disorder with anxiety  Chronic and stable on escitalopram         Diet: NPO for Medical/Clinical Reasons Except for: Meds  parenteral nutrition - ADULT compounded formula    DVT Prophylaxis: Defer to primary service  Gottlieb Catheter: PRESENT, indication: /GI/GYN Pelvic Procedure  Central Lines: PRESENT  PICC Double Lumen 05/04/22 Right Basilic-Site Assessment: WDL  Cardiac Monitoring: ACTIVE order. Indication: Tachyarrhythmias, acute (48 hours)  Code Status: Full Code      Disposition Plan   Expected Discharge: 05/09/2022     Anticipated discharge location: home with family    Delays:     Placement - Homecare  Other (Add Comment)            The patient's care was discussed with the Bedside Nurse.    Trey Mtz MD  Hospitalist Service  [facility]  Securely message with the Vocera Web Console (learn more here)  Text page via [employer]         Clinically Significant Risk Factors Present on Admission                     ______________________________________________________________________    Interval History   Chart reviewed. Discussed with RN.  Increasing creatinine noted again.  Agree with surgery's mgmt - US done, unremarkable, no hydro. Substantial GI losses noted and low urine output also. Boluses of IVF per primary continued - matching losses, repeat BMP in AM.  If creat continues to rise, consider nephrology consultation.    No new concerns for hospitalist - not requested to see patient. Available if needed.     Data reviewed today: I reviewed all medications, new labs and imaging results over the last 24 hours. I personally reviewed no images or EKG's today.    Physical Exam   Vital Signs: Temp: 98.6  F (37  C) Temp src: Oral BP: 135/59 Pulse: 60   Resp: 18 SpO2: 98 % O2 Device: None (Room air)    Weight: 194 lbs 14.19 oz      Data   Recent Labs   Lab 05/04/22  0715 05/03/22  1113 05/02/22  0728 05/01/22  0656 04/30/22  0725 04/29/22  0157 04/28/22  1120   WBC  --  7.4 7.8  --   --   --  5.5   HGB  --  14.8 13.6  --  11.9   < > 14.1   MCV  --  104* 103*  --   --   --  104*    389 296   < >  --   --  319    133 135  --  139   < > 138   POTASSIUM 4.2 4.2 3.9  --  3.8   < > 4.1   CHLORIDE 102 102 104  --  111*   < > 110*   CO2 26 25 26  --  25   < > 25   BUN 24 18 11  --  9   < > 18   CR 2.68* 2.26* 1.03  --  0.90   < > 1.18*   ANIONGAP 5 6 5  --  3   < > 3   GISELLE 8.7 9.6 9.2  --  8.6   < > 9.8   * 114* 113*  --  94   < > 108*   ALBUMIN  --  3.2*  --   --   --   --   --    PROTTOTAL  --  7.1  --   --   --   --   --    BILITOTAL  --  0.8  --   --   --   --   --    ALKPHOS  --  95  --   --   --   --   --    ALT  --  57*  --   --   --   --   --    AST  --  30  --   --   --   --   --     < > = values in this interval not displayed.     Recent Results (from the past 24 hour(s))   XR Abdomen 1 View    Narrative    XR ABDOMEN 1 VIEW 5/3/2022 3:13 PM    HISTORY: NGT advanced, recheck placement    COMPARISON:  None.      Impression    IMPRESSION: NG tube in good position in the stomach.    TRANG CLARKE MD         SYSTEM ID:  [id number]   US Renal Complete    Narrative    US RENAL COMPLETE 5/3/2022 3:56 PM    CLINICAL HISTORY: KAMRON, Cr from 1.03 --> 2.26, r/o hydronephrosis  TECHNIQUE: Routine Bilateral Renal and Bladder Ultrasound.    COMPARISON: None.    FINDINGS:    RIGHT KIDNEY: 11 x 5.5 x 4 cm. Normal without hydronephrosis or  masses.     LEFT KIDNEY: 11 x 5 x 7 cm. Normal without hydronephrosis or masses.     BLADDER: Normal.      Impression    IMPRESSION:  1.  Normal kidney ultrasound. No hydronephrosis.    TRANG CLARKE MD         SYSTEM ID:  [id number]

## 2022-05-04 NOTE — PLAN OF CARE
Goal Outcome Evaluation:    A&Ox4, VSS on RA, up with 1 assist, gait belt, and walker. Pt on tele for bradycardia. Denies pain throughout shift. NPO due to NG tube in place. NG tube pre-placed to 69, good output, no nausea or vomiting. Gottlieb placed, low urine output. HENNY with serosanguinous output. Ileostomy with adequate liquid green/brown output.

## 2022-05-04 NOTE — PROGRESS NOTES
Reason for admission: Rectal cancer  Surgical Procedure/s: ROBOTIC ASSISTED LOW ANTERIOR RESECTION WITH DIVERTING LOOP ILEOSTOMY  POD#: 6  Mental Status: x4, forgetful  Activity: SBA  Diet: NPO xcept ice chips  Pain: Controlled w/ analgesics  Urination: Indwelling FC  Tele/Restraints/Iso: NSR  LDA: PIV infusing, PICC, Ileostomy w/ appliance, indw FC  Expected D/C Date: TBD clinical progress.  Other Info: On Nacl 0.9% infusion base on NG output. Please see orders. Currently at 200 ml/hr. Decrease UO noted. Cr 2.68 this AM, provider aware. TPN to start at 8pm please see orders. Strict I/O per provider. Total IVF intake from morning including the bolus is 1400 ml as per 1852pm.

## 2022-05-04 NOTE — PROGRESS NOTES
"Colon & Rectal Surgery Progress Note             Interval History:   Postop Day #: 6 Robotic LAR with DLI  NGT with 4+ L out put, low UOP but a bit better over night.    900 stoma, HENNY out 190   Up walking some, pain controlled.  crt elevated yesterday, ultrasound favorable, HENNY crt as expected(same as serum),                  Medications:   I have reviewed this patient's current medications               Physical Exam:   Blood pressure (!) 140/96, pulse 67, temperature 99.3  F (37.4  C), temperature source Oral, resp. rate 18, height 1.6 m (5' 3\"), weight 88.4 kg (194 lb 14.2 oz), SpO2 95 %.    Intake/Output Summary (Last 24 hours) at 5/4/2022 0730  Last data filed at 5/4/2022 0600  Gross per 24 hour   Intake 2210 ml   Output 3675 ml   Net -1465 ml     GEN:  alert  ABD:  Soft, round incisions cdi, stoma pink and productive.         Data:        Lab Results   Component Value Date     05/03/2022    Lab Results   Component Value Date    CHLORIDE 102 05/03/2022    Lab Results   Component Value Date    BUN 18 05/03/2022      Lab Results   Component Value Date    POTASSIUM 4.2 05/03/2022    Lab Results   Component Value Date    CO2 25 05/03/2022    Lab Results   Component Value Date    CR 2.26 05/03/2022    CR 1.03 05/02/2022        Lab Results   Component Value Date    HGB 14.8 05/03/2022    HGB 13.6 05/02/2022     Lab Results   Component Value Date     05/04/2022     05/03/2022     Lab Results   Component Value Date    WBC 7.4 05/03/2022    WBC 7.8 05/02/2022            Assessment and Plan:   POD 6 with ileus  High NGT output.  Will recheck labs, give additional fluid  PICC and TPN given likely mcmanus for several more days of NGT  Encourage ambulation  Stoma care and teaching    Reviewed pathology T1N0     Lay Connolly MD  [facility]  Office Phone # [phone]    "

## 2022-05-04 NOTE — PROGRESS NOTES
"[facility] Nurse Inpatient Ostomy Assessment     Assessment of new loop Ileostomy     Surgery date:  4/28/22  Surgeon:  Dr. Lay Connolly  Procedure:  Low anterior resection with diverting loop ileostomy  Related diagnosis:  Low rectal cancer    WO Assessment & Physical Exam:        Current status: Pt in bed with NG and feeling much better after getting cleaned up. Pt unsure about using lock and roll pouch today and concerned that her memory isn't as sharp following surgery.      Date of last photo: 4/29/22          Stoma location: RUQ    Stoma size: 1 1/8\"  Visualized through pouch 5/4    Stoma appearance: pink-red, round and edematous  Visualized through pouch 5/4    Mucocutaneous junction:  Intact  5/3    Peristomal complication(s): none, moderate bruising from 2 to 9 O'clock 5/3    Abdominal assessment: distended    Surgical site(s): open to air    NG still in place? No    Output: brown and liquid     Pain: Cramping    Is patient still on a PCA? 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 Pt emptying pouch independently. Pt practiced emptying pouch at bedside with WOC      Psychosocial: Patient is pleasant and interactive asking appropriate quesitons      WOC Plan:         Pouching product plan: Geena 2 piece 57mm flat with ring and lock and roll    Comments: monitor for abdominal changes that may require changes to pouching    Frequency of pouch changes: 2-3x weekly      Pt support system on discharge: spouse, however pt feels she should be able to manage.     WOC recommend home care?  yes      WO follow-up plan: daily M-F      Objective Data:       Patient history according to medical record: Janet Figueroa is " a 71 year old female admitted on 4/28/2022. She has a past medical history of hypertension, mild COPD, CAD s/p PCI, dyslipidemia, paroxysmal A. Fib on eliquis, CKD, stage IIIb, renal artery stenosis, hypothyroidism, depression/anxiety, prediabetes, GERD, renal artery stenosis, who is status post low anterior resection with diverting loop ileostomy due to rectal cancer.      Current Diet/Nutrition:   Orders Placed This Encounter      NPO for Medical/Clinical Reasons Except for: Meds       Output:  I/O last 3 completed shifts:  In: 2210 [I.V.:2210]  Out: 5300 [Urine:180; Emesis/NG output:4025; Drains:190; Stool:905]      Labs:   Recent Labs   Lab 05/03/22  1113   ALBUMIN 3.2*   HGB 14.8   WBC 7.4         Star Risk Assessment:   Sensory Perception: 4-->no impairment  Moisture: 4-->rarely moist  Activity: 3-->walks occasionally  Mobility: 3-->slightly limited  Nutrition: 2-->probably inadequate  Friction and Shear: 2-->potential problem  Star Score: 18      WOC Interventions:       Patient's chart evaluated    Focus of today's visit: verbal instruction , diet and hydration , pouch emptying, output measurement, odor/flatus management, lifestyle adjustments and discharge instructions     Pouch changed 4/29, 5/3    Participant(s) in teaching session today: patient  and nurse    Change made with ostomy management today: No    Supplies: at bedside    Educational materials: lakisha folder at bedside.    Preparation for discharge: No discharge preparations started    Registered for supply samples? TBD    Discussed plan of care with: Patient and Nurse    Gab Nye RN, CWOCN

## 2022-05-04 NOTE — PLAN OF CARE
POD 6.  VSS on RA, A/O x4 but forgetful.  HENNY with serosanguinous output, ileostomy with light liquid brown output. C/o of abdominal pain and intermittent nausea, managed with oxy and Zofran.  Gottlieb intact with low urine output.  NG on intermittent suction, large amount of green output. Tolerating NPO diet with ice chips.  PICC  line placed this shift, TPN scheduled to begin later tonight.  Up with A1, ambulating in room.

## 2022-05-04 NOTE — PROCEDURES
[facility]    Double Lumen PICC Placement    Date/Time: 5/4/2022 1:37 PM  Performed by: Tomas Saravia RN  Authorized by: Olga Rangel PA-C   Indications: vascular access      UNIVERSAL PROTOCOL   Site Marked: Yes  Prior Images Obtained and Reviewed:  Yes  Required items: Required blood products, implants, devices and special equipment available    Patient identity confirmed:  Verbally with patient, arm band and hospital-assigned identification number  NA - No sedation, light sedation, or local anesthesia  Confirmation Checklist:  Patient's identity using two indicators, relevant allergies, procedure was appropriate and matched the consent or emergent situation and correct equipment/implants were available  Time out: Immediately prior to the procedure a time out was called    Universal Protocol: the Joint Commission Universal Protocol was followed    Preparation: Patient was prepped and draped in usual sterile fashion       ANESTHESIA    Local Anesthetic: Lidocaine 1% without epinephrine  Anesthetic Total (mL):  1      SEDATION    Patient Sedated: No        Skin prep agent: skin prep agent completely dried prior to procedure  Sterile barriers: maximum sterile barriers were used: cap, mask, sterile gown, sterile gloves, and large sterile sheet  Hand hygiene: hand hygiene performed prior to central venous catheter insertion  Type of line used: Power PICC  Catheter type: double lumen  Lumen type: valved  Catheter size: 5 Fr  Brand: Bard  Lot number: [id number]  Placement method: venipuncture, MST and ultrasound  Number of attempts: 1  Difficulty threading catheter: yes  Successful placement: yes  Orientation: right    Location: basilic vein  Arm circumference: adults 10 cm  Extremity circumference: 38  Visible catheter length: 2  Internal length: 45 cm  Total catheter length: 47  Dressing and securement: transparent securement dressing, sterile dressing applied, chlorhexidine patch  applied and securement device  Post procedure assessment: placement verified by 3CG technology and blood return through all ports  PROCEDURE   Patient Tolerance:  Patient tolerated the procedure well with no immediate complicationsDescribe Procedure: Nursing note  Pt a/o x 4, but forgetful. The writer explained the risks/benefits of the procedure to the pt and addressed all pt concerns. Pt verbalized understanding. Found 3 mm right basilic vein and was successful on one attempt with good blood return.

## 2022-05-05 LAB
ALBUMIN SERPL-MCNC: 2.4 G/DL (ref 3.4–5)
ALP SERPL-CCNC: 75 U/L (ref 40–150)
ALT SERPL W P-5'-P-CCNC: 39 U/L (ref 0–50)
ANION GAP SERPL CALCULATED.3IONS-SCNC: 5 MMOL/L (ref 3–14)
AST SERPL W P-5'-P-CCNC: 23 U/L (ref 0–45)
BILIRUB DIRECT SERPL-MCNC: 0.1 MG/DL (ref 0–0.2)
BILIRUB SERPL-MCNC: 0.5 MG/DL (ref 0.2–1.3)
BUN SERPL-MCNC: 27 MG/DL (ref 7–30)
CALCIUM SERPL-MCNC: 8.3 MG/DL (ref 8.5–10.1)
CHLORIDE BLD-SCNC: 110 MMOL/L (ref 94–109)
CO2 SERPL-SCNC: 23 MMOL/L (ref 20–32)
CREAT SERPL-MCNC: 1.76 MG/DL (ref 0.52–1.04)
GFR SERPL CREATININE-BSD FRML MDRD: 30 ML/MIN/1.73M2
GLUCOSE BLD-MCNC: 122 MG/DL (ref 70–99)
GLUCOSE BLDC GLUCOMTR-MCNC: 105 MG/DL (ref 70–99)
GLUCOSE BLDC GLUCOMTR-MCNC: 109 MG/DL (ref 70–99)
GLUCOSE BLDC GLUCOMTR-MCNC: 115 MG/DL (ref 70–99)
GLUCOSE BLDC GLUCOMTR-MCNC: 120 MG/DL (ref 70–99)
GLUCOSE BLDC GLUCOMTR-MCNC: 120 MG/DL (ref 70–99)
GLUCOSE BLDC GLUCOMTR-MCNC: 92 MG/DL (ref 70–99)
INR PPP: 0.98 (ref 0.85–1.15)
MAGNESIUM SERPL-MCNC: 2.1 MG/DL (ref 1.6–2.3)
PHOSPHATE SERPL-MCNC: 2.5 MG/DL (ref 2.5–4.5)
POTASSIUM BLD-SCNC: 3.5 MMOL/L (ref 3.4–5.3)
PROT SERPL-MCNC: 5.7 G/DL (ref 6.8–8.8)
SARS-COV-2 RNA RESP QL NAA+PROBE: NEGATIVE
SODIUM SERPL-SCNC: 138 MMOL/L (ref 133–144)

## 2022-05-05 PROCEDURE — 250N000011 HC RX IP 250 OP 636

## 2022-05-05 PROCEDURE — U0005 INFEC AGEN DETEC AMPLI PROBE: HCPCS | Performed by: COLON & RECTAL SURGERY

## 2022-05-05 PROCEDURE — 80053 COMPREHEN METABOLIC PANEL: CPT | Performed by: PHYSICIAN ASSISTANT

## 2022-05-05 PROCEDURE — 250N000009 HC RX 250

## 2022-05-05 PROCEDURE — 84100 ASSAY OF PHOSPHORUS: CPT | Performed by: PHYSICIAN ASSISTANT

## 2022-05-05 PROCEDURE — 258N000003 HC RX IP 258 OP 636: Performed by: INTERNAL MEDICINE

## 2022-05-05 PROCEDURE — 250N000011 HC RX IP 250 OP 636: Performed by: COLON & RECTAL SURGERY

## 2022-05-05 PROCEDURE — G0463 HOSPITAL OUTPT CLINIC VISIT: HCPCS

## 2022-05-05 PROCEDURE — 250N000011 HC RX IP 250 OP 636: Performed by: HOSPITALIST

## 2022-05-05 PROCEDURE — 258N000003 HC RX IP 258 OP 636: Performed by: COLON & RECTAL SURGERY

## 2022-05-05 PROCEDURE — 36415 COLL VENOUS BLD VENIPUNCTURE: CPT | Performed by: PHYSICIAN ASSISTANT

## 2022-05-05 PROCEDURE — 120N000001 HC R&B MED SURG/OB

## 2022-05-05 PROCEDURE — 250N000009 HC RX 250: Performed by: HOSPITALIST

## 2022-05-05 PROCEDURE — 85610 PROTHROMBIN TIME: CPT | Performed by: PHYSICIAN ASSISTANT

## 2022-05-05 PROCEDURE — 83735 ASSAY OF MAGNESIUM: CPT | Performed by: PHYSICIAN ASSISTANT

## 2022-05-05 PROCEDURE — 999N000190 HC STATISTIC VAT ROUNDS

## 2022-05-05 PROCEDURE — 82248 BILIRUBIN DIRECT: CPT | Performed by: PHYSICIAN ASSISTANT

## 2022-05-05 RX ORDER — HYDRALAZINE HYDROCHLORIDE 20 MG/ML
5 INJECTION INTRAMUSCULAR; INTRAVENOUS EVERY 6 HOURS
Status: DISCONTINUED | OUTPATIENT
Start: 2022-05-05 | End: 2022-05-13

## 2022-05-05 RX ADMIN — ENOXAPARIN SODIUM 30 MG: 30 INJECTION SUBCUTANEOUS at 17:40

## 2022-05-05 RX ADMIN — METOPROLOL TARTRATE 5 MG: 5 INJECTION INTRAVENOUS at 17:40

## 2022-05-05 RX ADMIN — SODIUM CHLORIDE, PRESERVATIVE FREE 200 ML: 5 INJECTION INTRAVENOUS at 11:36

## 2022-05-05 RX ADMIN — METOPROLOL TARTRATE 5 MG: 5 INJECTION INTRAVENOUS at 10:30

## 2022-05-05 RX ADMIN — HYDROMORPHONE HYDROCHLORIDE 0.2 MG: 0.2 INJECTION, SOLUTION INTRAMUSCULAR; INTRAVENOUS; SUBCUTANEOUS at 01:53

## 2022-05-05 RX ADMIN — ONDANSETRON 4 MG: 2 INJECTION INTRAMUSCULAR; INTRAVENOUS at 14:38

## 2022-05-05 RX ADMIN — SODIUM CHLORIDE, PRESERVATIVE FREE: 5 INJECTION INTRAVENOUS at 04:18

## 2022-05-05 RX ADMIN — HYDROMORPHONE HYDROCHLORIDE 0.2 MG: 0.2 INJECTION, SOLUTION INTRAMUSCULAR; INTRAVENOUS; SUBCUTANEOUS at 22:11

## 2022-05-05 RX ADMIN — HYDROMORPHONE HYDROCHLORIDE 0.2 MG: 0.2 INJECTION, SOLUTION INTRAMUSCULAR; INTRAVENOUS; SUBCUTANEOUS at 14:39

## 2022-05-05 RX ADMIN — UMECLIDINIUM 1 PUFF: 62.5 AEROSOL, POWDER ORAL at 08:16

## 2022-05-05 RX ADMIN — SODIUM CHLORIDE, PRESERVATIVE FREE: 5 INJECTION INTRAVENOUS at 08:02

## 2022-05-05 RX ADMIN — HYDROMORPHONE HYDROCHLORIDE 0.2 MG: 0.2 INJECTION, SOLUTION INTRAMUSCULAR; INTRAVENOUS; SUBCUTANEOUS at 07:42

## 2022-05-05 RX ADMIN — SODIUM CHLORIDE, PRESERVATIVE FREE: 5 INJECTION INTRAVENOUS at 17:50

## 2022-05-05 RX ADMIN — ONDANSETRON 4 MG: 2 INJECTION INTRAMUSCULAR; INTRAVENOUS at 07:42

## 2022-05-05 RX ADMIN — METOPROLOL TARTRATE 5 MG: 5 INJECTION INTRAVENOUS at 22:12

## 2022-05-05 RX ADMIN — METOPROLOL TARTRATE 5 MG: 5 INJECTION INTRAVENOUS at 04:38

## 2022-05-05 RX ADMIN — LEVOTHYROXINE SODIUM 19 MCG: 20 INJECTION, SOLUTION INTRAVENOUS at 10:28

## 2022-05-05 RX ADMIN — HYDRALAZINE HYDROCHLORIDE 5 MG: 20 INJECTION INTRAMUSCULAR; INTRAVENOUS at 21:32

## 2022-05-05 RX ADMIN — SODIUM CHLORIDE, PRESERVATIVE FREE: 5 INJECTION INTRAVENOUS at 01:12

## 2022-05-05 RX ADMIN — HYDRALAZINE HYDROCHLORIDE 5 MG: 20 INJECTION INTRAMUSCULAR; INTRAVENOUS at 18:05

## 2022-05-05 RX ADMIN — SODIUM CHLORIDE, PRESERVATIVE FREE: 5 INJECTION INTRAVENOUS at 22:01

## 2022-05-05 RX ADMIN — HYDRALAZINE HYDROCHLORIDE 5 MG: 20 INJECTION INTRAMUSCULAR; INTRAVENOUS at 10:28

## 2022-05-05 RX ADMIN — SODIUM CHLORIDE 1000 ML: 9 INJECTION, SOLUTION INTRAVENOUS at 02:11

## 2022-05-05 RX ADMIN — MAGNESIUM SULFATE HEPTAHYDRATE: 500 INJECTION, SOLUTION INTRAMUSCULAR; INTRAVENOUS at 21:47

## 2022-05-05 ASSESSMENT — ACTIVITIES OF DAILY LIVING (ADL)
ADLS_ACUITY_SCORE: 8
ADLS_ACUITY_SCORE: 10
ADLS_ACUITY_SCORE: 8
ADLS_ACUITY_SCORE: 10
ADLS_ACUITY_SCORE: 8
ADLS_ACUITY_SCORE: 10
ADLS_ACUITY_SCORE: 10
ADLS_ACUITY_SCORE: 8
ADLS_ACUITY_SCORE: 10
ADLS_ACUITY_SCORE: 10
ADLS_ACUITY_SCORE: 8
ADLS_ACUITY_SCORE: 8
ADLS_ACUITY_SCORE: 10
ADLS_ACUITY_SCORE: 8
ADLS_ACUITY_SCORE: 10
ADLS_ACUITY_SCORE: 8

## 2022-05-05 NOTE — PROVIDER NOTIFICATION
Brief update:    Low UOP; note 500 ml bolus on evening, increasing creat, high NG output    Additional 1L NS bolus    Wisam Scales MD

## 2022-05-05 NOTE — PROGRESS NOTES
COLON & RECTAL SURGERY  PROGRESS NOTE    May 5, 2022  Post-op Day #7     SUBJECTIVE:  Feels memory is not as sharp as prior to surgery. Feels some suprapubic pressure. Low uop overnight, multiple boluses of NS. UOP over last hour 200cc. NGT output 3300cc/24hrs. Stoma 1075cc/24hrs.     OBJECTIVE:  Temp:  [98.5  F (36.9  C)-98.8  F (37.1  C)] 98.8  F (37.1  C)  Pulse:  [73-91] 73  Resp:  [16-20] 16  BP: (150-165)/(64-78) 165/74  SpO2:  [94 %-97 %] 94 %    Intake/Output Summary (Last 24 hours) at 5/5/2022 0808  Last data filed at 5/5/2022 0805  Gross per 24 hour   Intake 1576.67 ml   Output 5440 ml   Net -3863.33 ml       GENERAL:  Awake, alert, no acute distress  HEAD: Normocephalic atraumatic  SCLERA: Anicteric  EXTREMITIES: Warm and well perfused  ABDOMEN:  Soft, appropriately tender, non-distended. No guarding, rigidity, or peritoneal signs. Stoma pink and protruding with light green, thin serous fluid present in bag. HENNY drain with serosanguinous drainage. Gottlieb with light, clear yellow urine present.   INCISION:  C/d/i    LABS:  Lab Results   Component Value Date    WBC 7.4 05/03/2022     Lab Results   Component Value Date    HGB 14.8 05/03/2022     Lab Results   Component Value Date    HCT 44.4 05/03/2022     Lab Results   Component Value Date     05/04/2022     Last Basic Metabolic Panel:  Lab Results   Component Value Date     05/04/2022      Lab Results   Component Value Date    POTASSIUM 4.2 05/04/2022     Lab Results   Component Value Date    CHLORIDE 102 05/04/2022     Lab Results   Component Value Date    GISELLE 8.7 05/04/2022     Lab Results   Component Value Date    CO2 26 05/04/2022     Lab Results   Component Value Date    BUN 24 05/04/2022     Lab Results   Component Value Date    CR 2.68 05/04/2022     Lab Results   Component Value Date     05/05/2022     05/04/2022       ASSESSMENT/PLAN: 72 yo F s/p robotic low anterior resection with diverting loop ileostomy. AVSS. Now with  ileus and KAMRON. Cr starting to downtrend, 1.76 today.      1. TPN, NPO, continue NGT  2. PRN IV pain meds  3. Continue maintenance fluids and replacement fluids of NGT output 1:0.5 ratio every 4 hrs  4. Continue Hooker for close monitoring of UOP  5. OOB, ambulate  6. WOCN for stoma care and teaching  7. Lovenox for ppx    Discussed with Dr. Connolly.     For questions/paging, please contact the CRS office at [phone].    Olga Rangel PA-C  Colorectal Physician Assistant    [facility]  [address]  [place] [address]  T: [phone]  F: [phone]      Colon and Rectal Surgery Attending Note    Patient seen and examined independently.  Agree with above assessment and plan.  Feeling better. UOP up. NGT down trending. crt improving.   Pain controlled.   Abd with bruising.   Plan  Continue fluid replacement 1/2 NS for every 1cc of NGT output.   Continue hooker for now.    Lay Connolly MD  [facility]  [address], [address]  [place] [address]  T: [phone]  F: [phone]   [email or web address]

## 2022-05-05 NOTE — PLAN OF CARE
Pt VSS, on RA, A/O x4.  Pt c/o abdominal pain and nausea, managed with dilaudid and zofran with some relief.  HENNY drain with serosanguinous output, ileostomy with moderate light green output.  NG in place with green output. PICC line infusing with continuous TPN.  Pt tolerating NPO diet with ice chips. Ambulating with 1A in room.

## 2022-05-05 NOTE — PLAN OF CARE
Pt is A&O x4 but forgetful at times,on RA, VSS except for elevated BP, NG tube to low-intermittent suction, ileostomy bag intact with clear light green output, HENNY drain is patent with moderate serosanguineous output, pt received 1000 ml bolus per oncall due to low UOP  from hooker, pt had a 600ml output this shift, on continuous TPN, denies nausea, pain managed with dilaudid IV once, discharge pending. .

## 2022-05-05 NOTE — PROGRESS NOTES
[facility]    Medicine Progress Note - Hospitalist Service    Date of Admission:  4/28/2022    Assessment & Plan        Janet Figueroa is a 71 year old female with complex PMHx including rectal cancer, CAD s/p PCI, paroxysmal a-fib on chronic anticoagulation with apixaban, renal artery stenosis, CKD, prediabetes, and COPD who was admitted to the Colorectal Surgery service on 4/28/2022 for low anterior resection with diverting loop ileostomy. Hospitalist service was consulted for post-op medical management.        Rectal cancer s/p low anterior resection with diverting loop ileostomy on 4/28/2022  * Underwent procedure under GETA by Dr. Connolly, minimal EBL noted  - Post-op cares, pain control, and VTE prophylaxis as per CRS  - 5/2 N/V, NG with significant output noted, HENNY continued, IVF, lovenox - per surgery  - 5/3 NG repositioned per surgery, cont IVF - diet remains NPO per surgery  - 5/4 over 4 L out of NG - primary increasing fluids as below  - 5/5 Continue NPO, TPN, and NGT per CRS     Acute kidney injury - improving  CKD, stage IIIa  History of fibromuscular dysplasia of the right renal artery  - Creatinine stable ~1 range  - 5/3 - creat 2.26, likely due to decreased PO intake and vomiting  - 5/4 - creat 2.6, ongoing NG losses, BUN up, likely prerenal  - IVF boluses given per primary  - 5/5 - creatinine improving to 1.7, concur with primary service management with IV fluids      Pyuria  Hematuria, Resolved  * UA ordered on 4/30 for hematuria which has resolved (suspect traumatic Gottlieb catheter placement which has since been removed); also showed large LE and 29 wbcs. Patient reported urgency, which seems to be chronic. No fever/leukocytosis noted   - Defer antibiotics for now, 4/30 urine culture showed no growth     Paroxysmal atrial fibrillation   * PTA: carvedilol 12.5 mg BID, apixaban 5 mg BID  - Holding apixaban; resume once cleared by CRS  - On prophylactic enoxaparin per  CRS  - 5/2 - NPO per surgery, still with N/V - will have some coverage with metop 5 mg iv q6 for now     CAD, native heart, native vessels s/p PCI (RCA, 11/2019; LCx, 07/2008)  Chronic diastolic CHF  Essential hypertension  * PTA: carvedilol 12.5 mg BID, amlodipine 5 mg daily, irbesartan 300 mg daily, torsemide 20 mg BID, spironolactone 25 mg daily, atorvastatin 80 mg qhs  * Coronary angiogram (01/2020) showed no evidence for recurrent occlusive CAD  * TTE (11/2019) showed EF 55%, mild left atrial enlargement, mild MR, and trace TR  - PTA torsemide and spironolactone to resume when able to take PO  - 5/2 NPO and IVF per CRS  -- HOLD pta meds - prn hydralazine in place     COPD  * Chronic and stable on Incruse daily, albuterol prn  * Appears compensated without evidence of exacerbation     Prediabetes  Chronic and stable. No acute needs     Hypothyroidism  Chronic and stable on levothyroxine  - hold for now, ok to miss a day or two, can use IV if necessary in next couple days     Major recurrent depressive disorder with anxiety  Chronic and stable on escitalopram        Diet: NPO for Medical/Clinical Reasons Except for: Meds  parenteral nutrition - ADULT compounded formula  parenteral nutrition - ADULT compounded formula    DVT Prophylaxis: Defer to primary service  Gottlieb Catheter: PRESENT, indication: /GI/GYN Pelvic Procedure, /GI/GYN Pelvic Procedure  Central Lines: PRESENT  PICC Double Lumen 05/04/22 Right Basilic-Site Assessment: WDL  Cardiac Monitoring: ACTIVE order. Indication: Tachyarrhythmias, acute (48 hours)  Code Status: Full Code      Disposition Plan   Expected Discharge: 05/09/2022     Anticipated discharge location: home with family    Delays:     Placement - Homecare  Other (Add Comment)            The patient's care was discussed with the Bedside Nurse.    Trey Mtz MD  Hospitalist Service  [facility]  Securely message with the Vocera Web Console (learn more  here)  Text page via [facility] Paging/Directory         Clinically Significant Risk Factors Present on Admission                    ______________________________________________________________________    Interval History   Chart reviewed.  Discussed with RN.  No concerns for hospitalist service.  Creatinine improving nicely down to 1.7.  Concur with primary service fluid management and replacement.    Data reviewed today: I reviewed all medications, new labs and imaging results over the last 24 hours. I personally reviewed no images or EKG's today.    Physical Exam   Vital Signs: Temp: 98.8  F (37.1  C) Temp src: Oral BP: (!) 165/74 Pulse: 73   Resp: 16 SpO2: 94 % O2 Device: None (Room air)    Weight: 195 lbs 1.71 oz      Data   Recent Labs   Lab 05/05/22  1316 05/05/22  0740 05/05/22  0710 05/04/22  1659 05/04/22  0715 05/03/22  1113 05/02/22  0728 05/01/22  0656 04/30/22  0725   WBC  --   --   --   --   --  7.4 7.8  --   --    HGB  --   --   --   --   --  14.8 13.6  --  11.9   MCV  --   --   --   --   --  104* 103*  --   --    PLT  --   --   --   --  333 389 296   < >  --    INR  --   --  0.98  --   --   --   --   --   --    NA  --   --  138  --  133 133 135  --  139   POTASSIUM  --   --  3.5  --  4.2 4.2 3.9  --  3.8   CHLORIDE  --   --  110*  --  102 102 104  --  111*   CO2  --   --  23  --  26 25 26  --  25   BUN  --   --  27  --  24 18 11  --  9   CR  --   --  1.76*  --  2.68* 2.26* 1.03  --  0.90   ANIONGAP  --   --  5  --  5 6 5  --  3   GISELLE  --   --  8.3*  --  8.7 9.6 9.2  --  8.6   * 109* 122*   < > 116* 114* 113*  --  94   ALBUMIN  --   --  2.4*  --   --  3.2*  --   --   --    PROTTOTAL  --   --  5.7*  --   --  7.1  --   --   --    BILITOTAL  --   --  0.5  --   --  0.8  --   --   --    ALKPHOS  --   --  75  --   --  95  --   --   --    ALT  --   --  39  --   --  57*  --   --   --    AST  --   --  23  --   --  30  --   --   --     < > = values in this interval not displayed.     No results found for  this or any previous visit (from the past 24 hour(s)).

## 2022-05-05 NOTE — PROVIDER NOTIFICATION
Paged on call hosp DR. Barker to give FYI that since i received pt from 3 pm UO was only 100 ml.  MArs RN *[phone]    Dr. Barker Called back ordered 500cc bolus now. And then resume the current 1:1 NGT IVF ratio order after. Asked writer to paged her later re NGT and Urine output. Writer shift already ended and endorsed the order and care to the night nurse on duty. Night RN verbalized understanding of the care plan.

## 2022-05-05 NOTE — PLAN OF CARE
Patient alert & oriented x4. Patient can be forgetful at times. VSS on RA except for elevated BP of 155/78. NG tube had a total of 650 ml output. NG tube to low-intermittent suction. Patient's ileostomy had 500 ml of clear light green output. HENNY drain had moderate serosanguineous output. Patient received 500 ml bolus per Hospitalist orders because patient is having low urine output from Gottlieb. Patient started on TPN & Lipids. PIV is saline locked. Patient reports no nausea and moderate pain to lower abdomen. Patient up SBA. PICC line with double lumens have blood return noted. Both lumens are infusing. Bowel sounds hypoactive. Patient is very pleasant. Will pass on this information to the night shift nurse in report.

## 2022-05-05 NOTE — PROGRESS NOTES
"[facility]  WO Nurse Inpatient Ostomy Assessment     Assessment of new loop Ileostomy     Surgery date:  4/28/22  Surgeon:  Dr. Lay Connolly  Procedure:  Low anterior resection with diverting loop ileostomy  Related diagnosis:  Low rectal cancer    WO Assessment & Physical Exam:        Current status: Pt in bed with NG and feeling much better after getting cleaned up. Pt unsure about using lock and roll pouch today and concerned that her memory isn't as sharp following surgery.      Date of last photo: 4/29/22          Stoma location: RUQ    Stoma size: 1 1/8\"  Outside of barrier on 5/5    Stoma appearance: pink-red, round and edematous, removed pouch on 5/5     Mucocutaneous junction:  Intact  5/3    Peristomal complication(s): none, moderate bruising from 2 to 9 O'clock 5/3    Abdominal assessment: distended    Surgical site(s): open to air    NG still in place? Yes     Output: brown, yellow and liquid     Pain: Cramping    Is patient still on a PCA? No      Current pouching system: [place] 2 piece with lock and roll and barrier ring    Pouch last changed:  4/29, 5/3    Reason for pouch change today: changed from lock and roll to a high output pouch due to loose stools. 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 Pt emptying pouch independently. Pt practiced emptying pouch at bedside with WOC      Psychosocial: Patient is pleasant and interactive asking appropriate quesitons      WOC Plan:         Pouching product plan: [place] 2 piece 57mm flat with ring and high output pouch    Comments: monitor for abdominal changes that may require changes to pouching    Frequency of pouch changes: 2-3x weekly      Pt support system on discharge: spouse, however pt feels she should be able to manage.     WOC recommend home care?  yes      WOC follow-up plan: daily " M-F      Objective Data:       Patient history according to medical record: Janet Figueroa is a 71 year old female admitted on 4/28/2022. She has a past medical history of hypertension, mild COPD, CAD s/p PCI, dyslipidemia, paroxysmal A. Fib on eliquis, CKD, stage IIIb, renal artery stenosis, hypothyroidism, depression/anxiety, prediabetes, GERD, renal artery stenosis, who is status post low anterior resection with diverting loop ileostomy due to rectal cancer.      Current Diet/Nutrition:   Orders Placed This Encounter      NPO for Medical/Clinical Reasons Except for: Meds       Output:  I/O last 3 completed shifts:  In: 1576.67 [P.O.:620; I.V.:926.67; NG/GT:30]  Out: 5140 [Urine:625; Emesis/NG output:3300; Drains:140; Stool:1075]      Labs:   Recent Labs   Lab 05/05/22  0710 05/03/22  1113   ALBUMIN 2.4* 3.2*   HGB  --  14.8   INR 0.98  --    WBC  --  7.4         Star Risk Assessment:   Sensory Perception: 4-->no impairment  Moisture: 4-->rarely moist  Activity: 3-->walks occasionally  Mobility: 3-->slightly limited  Nutrition: 2-->probably inadequate  Friction and Shear: 2-->potential problem  Star Score: 18      WOC Interventions:       Patient's chart evaluated    Focus of today's visit: verbal instruction , diet and hydration , pouch emptying, output measurement, odor/flatus management, lifestyle adjustments and discharge instructions     Pouch changed 4/29, 5/3    Participant(s) in teaching session today: patient  and nurse    Change made with ostomy management today: No    Supplies: at bedside    Educational materials: lakisha folder at bedside.    Preparation for discharge: No discharge preparations started    Registered for supply samples? TBD    Discussed plan of care with: Patient and Nurse  Jeane Roach RN, CWOCN

## 2022-05-05 NOTE — PHARMACY-CONSULT NOTE
TPN formula evaluated based on today s labs results.    The following changes have been made:    Dextrose: increased to 270g/d.   Potassium: added .  Magnesium: added .  Phosphorus: added .  Chloride:Acetate ratio: changed to max acetate    Pharmacy will continue to follow and adjust as appropriate.    Thank you, Dea PEREZ, PharmD

## 2022-05-05 NOTE — PROVIDER NOTIFICATION
Paged regarding low urine output.     Pt reportedly had only 175cc urine output since this morning. Gottlieb is in place and apparently draining normally.     Pt had worsening KAMRON today     Will give 500cc bolus now. Also having high NG output and getting fluid replacement for that.     Maria E Barker

## 2022-05-05 NOTE — PROGRESS NOTES
.MD Notification    Notified Person: MD    Notified Person Name:Wisam Scales    Notification Date/Time:0125    Notification Interaction:Paged    Purpose of Notification: Low UOP from hooker    Orders Received:Yes    Comments:1L NS bolus

## 2022-05-06 LAB
ANION GAP SERPL CALCULATED.3IONS-SCNC: 7 MMOL/L (ref 3–14)
BUN SERPL-MCNC: 21 MG/DL (ref 7–30)
CALCIUM SERPL-MCNC: 8 MG/DL (ref 8.5–10.1)
CHLORIDE BLD-SCNC: 115 MMOL/L (ref 94–109)
CO2 SERPL-SCNC: 19 MMOL/L (ref 20–32)
CREAT SERPL-MCNC: 0.99 MG/DL (ref 0.52–1.04)
GFR SERPL CREATININE-BSD FRML MDRD: 60 ML/MIN/1.73M2
GLUCOSE BLD-MCNC: 124 MG/DL (ref 70–99)
GLUCOSE BLDC GLUCOMTR-MCNC: 107 MG/DL (ref 70–99)
GLUCOSE BLDC GLUCOMTR-MCNC: 111 MG/DL (ref 70–99)
GLUCOSE BLDC GLUCOMTR-MCNC: 127 MG/DL (ref 70–99)
GLUCOSE BLDC GLUCOMTR-MCNC: 130 MG/DL (ref 70–99)
GLUCOSE BLDC GLUCOMTR-MCNC: 134 MG/DL (ref 70–99)
GLUCOSE BLDC GLUCOMTR-MCNC: 136 MG/DL (ref 70–99)
HOLD SPECIMEN: NORMAL
MAGNESIUM SERPL-MCNC: 1.7 MG/DL (ref 1.6–2.3)
PHOSPHATE SERPL-MCNC: 2.4 MG/DL (ref 2.5–4.5)
POTASSIUM BLD-SCNC: 3.4 MMOL/L (ref 3.4–5.3)
SODIUM SERPL-SCNC: 141 MMOL/L (ref 133–144)

## 2022-05-06 PROCEDURE — 83735 ASSAY OF MAGNESIUM: CPT | Performed by: PHYSICIAN ASSISTANT

## 2022-05-06 PROCEDURE — 999N000190 HC STATISTIC VAT ROUNDS

## 2022-05-06 PROCEDURE — 36415 COLL VENOUS BLD VENIPUNCTURE: CPT | Performed by: PHYSICIAN ASSISTANT

## 2022-05-06 PROCEDURE — 120N000001 HC R&B MED SURG/OB

## 2022-05-06 PROCEDURE — 250N000011 HC RX IP 250 OP 636: Performed by: HOSPITALIST

## 2022-05-06 PROCEDURE — 250N000009 HC RX 250: Performed by: COLON & RECTAL SURGERY

## 2022-05-06 PROCEDURE — 258N000003 HC RX IP 258 OP 636: Performed by: COLON & RECTAL SURGERY

## 2022-05-06 PROCEDURE — 250N000011 HC RX IP 250 OP 636: Performed by: INTERNAL MEDICINE

## 2022-05-06 PROCEDURE — 250N000009 HC RX 250: Performed by: HOSPITALIST

## 2022-05-06 PROCEDURE — 250N000011 HC RX IP 250 OP 636: Performed by: COLON & RECTAL SURGERY

## 2022-05-06 PROCEDURE — 99232 SBSQ HOSP IP/OBS MODERATE 35: CPT | Performed by: HOSPITALIST

## 2022-05-06 PROCEDURE — G0463 HOSPITAL OUTPT CLINIC VISIT: HCPCS

## 2022-05-06 PROCEDURE — 80048 BASIC METABOLIC PNL TOTAL CA: CPT | Performed by: PHYSICIAN ASSISTANT

## 2022-05-06 PROCEDURE — 84100 ASSAY OF PHOSPHORUS: CPT | Performed by: PHYSICIAN ASSISTANT

## 2022-05-06 RX ORDER — ENOXAPARIN SODIUM 100 MG/ML
40 INJECTION SUBCUTANEOUS EVERY 24 HOURS
Status: DISCONTINUED | OUTPATIENT
Start: 2022-05-06 | End: 2022-05-17 | Stop reason: HOSPADM

## 2022-05-06 RX ADMIN — SODIUM CHLORIDE, PRESERVATIVE FREE: 5 INJECTION INTRAVENOUS at 07:59

## 2022-05-06 RX ADMIN — HYDROMORPHONE HYDROCHLORIDE 0.2 MG: 0.2 INJECTION, SOLUTION INTRAMUSCULAR; INTRAVENOUS; SUBCUTANEOUS at 16:57

## 2022-05-06 RX ADMIN — SODIUM CHLORIDE, PRESERVATIVE FREE: 5 INJECTION INTRAVENOUS at 20:59

## 2022-05-06 RX ADMIN — METOPROLOL TARTRATE 5 MG: 5 INJECTION INTRAVENOUS at 10:31

## 2022-05-06 RX ADMIN — HYDROMORPHONE HYDROCHLORIDE 0.2 MG: 0.2 INJECTION, SOLUTION INTRAMUSCULAR; INTRAVENOUS; SUBCUTANEOUS at 20:56

## 2022-05-06 RX ADMIN — HYDRALAZINE HYDROCHLORIDE 5 MG: 20 INJECTION INTRAMUSCULAR; INTRAVENOUS at 04:52

## 2022-05-06 RX ADMIN — METOPROLOL TARTRATE 5 MG: 5 INJECTION INTRAVENOUS at 16:28

## 2022-05-06 RX ADMIN — HYDROMORPHONE HYDROCHLORIDE 0.2 MG: 0.2 INJECTION, SOLUTION INTRAMUSCULAR; INTRAVENOUS; SUBCUTANEOUS at 08:28

## 2022-05-06 RX ADMIN — SODIUM CHLORIDE, PRESERVATIVE FREE: 5 INJECTION INTRAVENOUS at 16:40

## 2022-05-06 RX ADMIN — METOPROLOL TARTRATE 5 MG: 5 INJECTION INTRAVENOUS at 21:46

## 2022-05-06 RX ADMIN — ONDANSETRON 4 MG: 2 INJECTION INTRAMUSCULAR; INTRAVENOUS at 05:09

## 2022-05-06 RX ADMIN — SODIUM CHLORIDE, PRESERVATIVE FREE: 5 INJECTION INTRAVENOUS at 04:48

## 2022-05-06 RX ADMIN — ONDANSETRON 4 MG: 2 INJECTION INTRAMUSCULAR; INTRAVENOUS at 16:57

## 2022-05-06 RX ADMIN — METOPROLOL TARTRATE 5 MG: 5 INJECTION INTRAVENOUS at 04:48

## 2022-05-06 RX ADMIN — HYDROMORPHONE HYDROCHLORIDE 0.2 MG: 0.2 INJECTION, SOLUTION INTRAMUSCULAR; INTRAVENOUS; SUBCUTANEOUS at 05:04

## 2022-05-06 RX ADMIN — UMECLIDINIUM 1 PUFF: 62.5 AEROSOL, POWDER ORAL at 08:00

## 2022-05-06 RX ADMIN — MAGNESIUM SULFATE HEPTAHYDRATE: 500 INJECTION, SOLUTION INTRAMUSCULAR; INTRAVENOUS at 21:01

## 2022-05-06 RX ADMIN — HYDRALAZINE HYDROCHLORIDE 5 MG: 20 INJECTION INTRAMUSCULAR; INTRAVENOUS at 21:49

## 2022-05-06 RX ADMIN — PROCHLORPERAZINE EDISYLATE 5 MG: 5 INJECTION INTRAMUSCULAR; INTRAVENOUS at 05:50

## 2022-05-06 RX ADMIN — LEVOTHYROXINE SODIUM 19 MCG: 20 INJECTION, SOLUTION INTRAVENOUS at 08:00

## 2022-05-06 RX ADMIN — HYDRALAZINE HYDROCHLORIDE 5 MG: 20 INJECTION INTRAMUSCULAR; INTRAVENOUS at 10:32

## 2022-05-06 RX ADMIN — SODIUM CHLORIDE, PRESERVATIVE FREE: 5 INJECTION INTRAVENOUS at 00:15

## 2022-05-06 RX ADMIN — HYDROMORPHONE HYDROCHLORIDE 0.2 MG: 0.2 INJECTION, SOLUTION INTRAMUSCULAR; INTRAVENOUS; SUBCUTANEOUS at 11:54

## 2022-05-06 RX ADMIN — PROCHLORPERAZINE EDISYLATE 5 MG: 5 INJECTION INTRAMUSCULAR; INTRAVENOUS at 21:43

## 2022-05-06 RX ADMIN — ENOXAPARIN SODIUM 40 MG: 40 INJECTION SUBCUTANEOUS at 16:28

## 2022-05-06 ASSESSMENT — ACTIVITIES OF DAILY LIVING (ADL)
ADLS_ACUITY_SCORE: 8
ADLS_ACUITY_SCORE: 10
ADLS_ACUITY_SCORE: 8
ADLS_ACUITY_SCORE: 10
ADLS_ACUITY_SCORE: 10
ADLS_ACUITY_SCORE: 8
ADLS_ACUITY_SCORE: 8
ADLS_ACUITY_SCORE: 10
ADLS_ACUITY_SCORE: 8
ADLS_ACUITY_SCORE: 10
ADLS_ACUITY_SCORE: 8
ADLS_ACUITY_SCORE: 10
ADLS_ACUITY_SCORE: 8
ADLS_ACUITY_SCORE: 10
ADLS_ACUITY_SCORE: 10
ADLS_ACUITY_SCORE: 8
ADLS_ACUITY_SCORE: 10
ADLS_ACUITY_SCORE: 10

## 2022-05-06 NOTE — PROGRESS NOTES
"[facility] Nurse Inpatient Ostomy Assessment     Assessment of new loop Ileostomy     Surgery date:  4/28/22  Surgeon:  Dr. Lay Connolly  Procedure:  Low anterior resection with diverting loop ileostomy  Related diagnosis:  Low rectal cancer    WO Assessment & Physical Exam:      Current status: Pt in bed with NG clamped. Still with forgetfulness    Date of last photo: 5/6/22        Stoma location: RUQ    Stoma size: 1 1/4\"    Stoma appearance: pink-red, round and edematous    Mucocutaneous junction:  Intact      Peristomal complication(s): none, moderate bruising from 2 to 6 o'clock    Abdominal assessment: distended    Surgical site(s): open to air    NG still in place? Yes     Output: brown, green and liquid     Pain: Cramping    Is patient still on a PCA? 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 Pt emptying pouch independently. Pt practiced emptying pouch at bedside with WOC. 5/6 Patient assisted with pouch change and performed 80% independently but required significant verbal cuing.       Psychosocial: Patient is pleasant and interactive asking appropriate quesitons      WO Plan:         Pouching product plan: [place] 2 piece 57mm flat with ring and high output pouch    Comments: monitor for abdominal changes that may require changes to pouching    Frequency of pouch changes: 2-3x weekly      Pt support system on discharge: spouse, however pt feels she should be able to manage.     WOC recommend home care?  yes      WO follow-up plan: daily M-F      Objective Data:       Patient history according to medical record: Janet Figueroa is a 71 year old female admitted on 4/28/2022. She has a " past medical history of hypertension, mild COPD, CAD s/p PCI, dyslipidemia, paroxysmal A. Fib on eliquis, CKD, stage IIIb, renal artery stenosis, hypothyroidism, depression/anxiety, prediabetes, GERD, renal artery stenosis, who is status post low anterior resection with diverting loop ileostomy due to rectal cancer.      Current Diet/Nutrition:   Orders Placed This Encounter      NPO for Medical/Clinical Reasons Except for: Meds       Output:  I/O last 3 completed shifts:  In: 4578 [I.V.:2824; NG/GT:30]  Out: 3875 [Urine:1950; Emesis/NG output:650; Drains:200; Stool:1075]      Labs:   Recent Labs   Lab 05/05/22  0710 05/03/22  1113   ALBUMIN 2.4* 3.2*   HGB  --  14.8   INR 0.98  --    WBC  --  7.4         Star Risk Assessment:   Sensory Perception: 4-->no impairment  Moisture: 4-->rarely moist  Activity: 3-->walks occasionally  Mobility: 3-->slightly limited  Nutrition: 2-->probably inadequate  Friction and Shear: 2-->potential problem  Star Score: 18      WOC Interventions:       Patient's chart evaluated    Focus of today's visit: pouch change return demonstration, verbal instruction , diet and hydration , pouch emptying, output measurement, odor/flatus management, lifestyle adjustments and discharge instructions     Pouch changed 4/29, 5/3, 5/6    Participant(s) in teaching session today: patient  and nurse    Change made with ostomy management today: No    Supplies: at bedside    Educational materials: lakisha folder at bedside.    Preparation for discharge: No discharge preparations started    Registered for supply samples? TBD    Discussed plan of care with: Patient and Nurse  Jeane Roach RN, CWOCN

## 2022-05-06 NOTE — PLAN OF CARE
Patient is alert and oriented to self, place, time, and situation but can be forgetful at times; easily redirected and easily tearful and anxious. Elevated BP, otherwise VSS on room air. Reports 4/10 abd pain at this time; prn IV Dilaudid given prn.  NG to intermittent suction and flushed  with 30 ml of water due to slight occlusion of dark green solid output. There are 4 lap sites FUAD with skin glue to abd. There is scattered bruising to abd as well. Ileostomy RMQ is patent with loose stool (green flecks of solid stool); color is dark green. HENNY drain to LLQ and patent; saturated dressing changed at this time and output is serosanguinous. Bowel sounds Hypoactive. Ileostomy intact. Gottlieb intact and cares performed. BG q4h. Strict I/Os. Patient remains NPO with unnecessary medications held.  Will continue to monitor.

## 2022-05-06 NOTE — PHARMACY
TPN formula evaluated based on today s labs results.    The following changes have been made:  Potassium: increased to 30 mEq .  Calcium:  increased to 10 mEq .  Magnesium: increased to 6 mEq .  Phosphorus: increased to 15 mMol .  Renal function much improved - electrolytes increased closer to standard doses     Pharmacy will continue to follow and adjust as appropriate.

## 2022-05-06 NOTE — PROGRESS NOTES
"SPIRITUAL HEALTH SERVICES Progress Note  [facility]    Request - Length-of-Stay visit.      Illness Narrative - Janet shared a wide-ranging narrative incorporating elements of her cancer story, professional nursing career, life experiences as a  and adoptive mother, and complex family dynamics.      Distress - Janet explored her desire for more support from her spouse and wondered about his own coping strategies. She was tearful at times during the visit, and she considered her \"brain fog\" and named frustration and impatience about \"getting her head straight.\" She also told several stories about being concerned she'd hurt staff's feelings with things she'd said in her confusion and needing to apologize frequently. She spoke of medication changes and awareness of having to continue to \"re-balance them now that her body is different.\"      Coping - Janet spoke of her two sons and her pride in the adults they've become; she expressed gratitude for them and their support and presence through her illness.      Meaning-Making - Janet spoke of current events and societal concerns, and that she sometimes converses with individual staff; she wondered if that was \"why she's here.\" We explored self-compassion and how it relates to being a caregiver.      Plan - I offered reflective conversation and reassurance, affirmed experiences, normalized emotions, invited discernment, and said a blessing.    SH will follow.    Rev Sherri Sauceda  Associate   Spiritual Health Phone Line [phone]  Spiritual Health Pager [phone]  "

## 2022-05-06 NOTE — PLAN OF CARE
Pt VSS, ex elevated BP, on RA, A/O x4 but forgetful.  Pt c/o of lft hand and foot pain, and abd pain, managed with dilaudid.  Notified MD of new hand and foot pain, xray ordered.  Denies nausea this shift.  NG removed after successful clamping trial, pt tolerating clears.  Lap sites CDI.  Pt on continuous TPN infusing through rt PICC. Ileostomy with drk green output. Gottlieb patent with adequate output. Pt up with A1, ambulating in room.

## 2022-05-06 NOTE — PLAN OF CARE
Pharmacy messaged regarding missing 7am medication. Awaiting recommendations.     Patient has been increasingly upset, anxious, and  restless this shift. Patient currently reports pain of 8/10 and nausea/vomiting with dry heaves. Prn medications given. Patient is also constantly apologizing for feeling anxious and sad and can be inconsolable at times. Mentions she takes lexapro for anxiety but writer mentioned concerns for patient's mental  wellbeing. Patient does not intend to harm herself nor does she have a plan to harm herself. Will forward information to day nurse for provider to address concerns.

## 2022-05-06 NOTE — PROGRESS NOTES
Date/Time: 2022 7049-5500  Summary: POD6 bowel resection. Hx of rectal cancer  Cognitive Concerns/Orientation: A&Ox4 forgetful  Behavior and Aggression Color: green  ABNL VS/O2: VSS on RA ex HTN  Mobility: Ax1 GB  Pain Management: managed with IV dilaudid. Pt complains of nausea. zofran given x1. Compazine given x1. Pt threw up x1.   Diet: clear liquids. NG removed earlier today. Pt on continuous TPN running at 75mL/hr  Bowel/Bladder: hooker. Ileostomy.  ABNL Labs/BG: B, 111  Drain/Devices: L PIV SL. R PICC SL. HENNY drain. HENNY drain leaks at site. MD aware- told to strip tube more frequently and change dressing as needed. Last dressing changed at 2300.  Telemetry Rhythm: NA  Skin: LAP sites FUAD. Transverse pelvic incision FUAD  Tests/Procedures: XR hand  Discharge Date: expected discharge on  to home with family.   Other Info:

## 2022-05-06 NOTE — PLAN OF CARE
Goal Outcome Evaluation:    A&Ox4, forgetful at times. VSS on RA. Abdominal pain manage with dilaudid x1.  HENNY drain with serosanguinous output, ileostomy with moderate light green output.  NG in place with green output, bolus of NS given per order. PICC line infusing with continuous TPN, new bag started this evening.  NPO except ice chips. Assist of one but not out of bed for this shift.

## 2022-05-06 NOTE — PROGRESS NOTES
"Colon & Rectal Surgery Progress Note             Interval History:   Postop Day #: 8 robotic LAR with DI now with resolving ileus  NGT output 850 for the day. UOP 2 L, stoma 1L getting TPN at 75 of 1/2 ml NS for every ml of NGT output over 4 hours.   Anxiety this am, labs just being drawn.                Medications:   I have reviewed this patient's current medications               Physical Exam:   Blood pressure (!) 137/95, pulse 61, temperature 98.5  F (36.9  C), temperature source Oral, resp. rate 16, height 1.6 m (5' 3\"), weight 88.5 kg (195 lb 1.7 oz), SpO2 97 %.    Intake/Output Summary (Last 24 hours) at 5/6/2022 0812  Last data filed at 5/6/2022 0807  Gross per 24 hour   Intake 5743 ml   Output 3575 ml   Net 2168 ml     GEN:  alert  ABD:  Soft with bruising, stoma output green liquid, HENNY serous         Data:        Lab Results   Component Value Date     05/05/2022    Lab Results   Component Value Date    CHLORIDE 110 05/05/2022    Lab Results   Component Value Date    BUN 27 05/05/2022      Lab Results   Component Value Date    POTASSIUM 3.5 05/05/2022    Lab Results   Component Value Date    CO2 23 05/05/2022    Lab Results   Component Value Date    CR 1.76 05/05/2022    CR 2.68 05/04/2022        Lab Results   Component Value Date    HGB 14.8 05/03/2022    HGB 13.6 05/02/2022     Lab Results   Component Value Date     05/04/2022     05/03/2022     Lab Results   Component Value Date    WBC 7.4 05/03/2022    WBC 7.8 05/02/2022            Assessment and Plan:   POD 8 with much less NGT out put. Will try a clapping trial.   1) Clamp NGT for 4 hours, replace on LIWS for 30 min, if output <200 may remove ngt, if >200 or nausea, return to LIWS.  2) continue TPN, NPO, glucose control  3) encourage ambulation  4) continue lovenox, will need to restart Eliquis at some point once ileus resolves.   5) stoma care and teaching.  6) strict I and O with documentation       Lay Connolly MD  [facility]  Office Phone # [phone]

## 2022-05-06 NOTE — PROGRESS NOTES
[facility]    Medicine Progress Note - Hospitalist Service       Date of Admission:  4/28/2022    Assessment & Plan   Janet Figueroa is a 71 year old female with complex PMHx including rectal cancer, CAD s/p PCI, paroxysmal a-fib on chronic anticoagulation with apixaban, renal artery stenosis, CKD, prediabetes, and COPD who was admitted to the Colorectal Surgery service on 4/28/2022 for low anterior resection with diverting loop ileostomy. Hospitalist service was consulted for post-op medical management. Hospital course has been complicated by prolonged post-op ileus      Rectal cancer s/p low anterior resection with diverting loop ileostomy on 4/28/2022  Post-operative ileus  * Underwent procedure under GETA by Dr. Connolly, minimal EBL noted  * Developed acute abdominal pain with nausea/vomiting on 5/2. NG tube placed.    - Management as per CRS  - On clear liquids as per CRS  - On TPN for supplemental nutrition as per CRS  - Currently on enoxaparin for VTE ppx, plan to resume home apixaban per CRS once ileus resolves     Left thumb pain and swelling, Improved  Developed persistent left thumb pain and swelling during this hospitalization after trying to push herself up. Suspect sprain, although patient believes she is having a gout flare. Left hand XR (5/6) showed no acute fractures or dislocations. Believes she is having a gout flare  - Colchicine was initially ordered, but can cause severe toxicity while on carvedilol and amio. Would try to avoid steroids for now in setting of recent surgery. Given low suspicion for gout flare, will defer treatment and continue supportive cares with ice and pain meds prn    Non-anion gap metabolic acidosis  HCO3 19, AG 7. Suspect GI losses  - Monitor for now, repeat BMP in AM     KAMRON on CKD, stage IIIa, Resolved  History of fibromuscular dysplasia of the right renal artery  * Creatinine increased up to 2.68 from baseline ~1 in the setting of ileus with  nausea/vomiting and decreased PO intake; corrected with IV fluids and TPN  - Creatinine now back to baseline ~1      Pyuria  Hematuria, Resolved  * UA ordered on 4/30 for hematuria which has resolved (suspect traumatic Gottlieb catheter placement which has since been removed); also showed large LE and 29 wbcs. Patient reported urgency, which seems to be chronic. No fever/leukocytosis noted, and urine culture returned negative.  - Antibiotics were deferred     Paroxysmal atrial fibrillation   * PTA: carvedilol 12.5 mg BID, apixaban 5 mg BID  - Holding home meds in the setting of ileus  - On metoprolol 5 mg IV q6h     CAD, native heart, native vessels s/p PCI (RCA, 11/2019; LCx, 07/2008)  Chronic diastolic CHF  Essential hypertension  * PTA: carvedilol 12.5 mg BID, amlodipine 5 mg daily, irbesartan 300 mg daily, torsemide 20 mg BID, spironolactone 25 mg daily, atorvastatin 80 mg qhs  * Coronary angiogram (01/2020) showed no evidence for recurrent occlusive CAD  * TTE (11/2019) showed EF 55%, mild left atrial enlargement, mild MR, and trace TR  - Resume PTA carvedilol and amlodipine today, 5/7  - Continue holding irbesartan and home diuretics until taking more PO  - Continue holding atorvastatin for now  - Hydralazine prn      COPD  * Chronic and stable on Incruse daily, albuterol prn  * Appears compensated without evidence of exacerbation     Prediabetes  Chronic and stable  - High SSI available, but blood sugars have been acceptable     Hypothyroidism  Chronic and stable on levothyroxine     Major recurrent depressive disorder with anxiety  Chronic and stable on escitalopram     Diet: parenteral nutrition - ADULT compounded formula  parenteral nutrition - ADULT compounded formula  Clear Liquid Diet    DVT Prophylaxis: Enoxaparin (Lovenox) SQ  Gottlieb Catheter: PRESENT, indication: /GI/GYN Pelvic Procedure, /GI/GYN Pelvic Procedure  Code Status: Full Code         Disposition: Expected discharge as per CRS    The  patient's care was discussed with the Patient.    Eda Andrews MD  Hospitalist Service  [facility]  Contact information available via [facility] Paging/Directory    ______________________________________________________________________    Interval History   Increased HENNY output last night noted. Nausea with 1 episode of emesis overnight. Otherwise reports left hand pain and swelling has improved. Discussed ongoing supportive care, but patient strongly believes she is having a gout flare, and is requesting treatment for this. Could consider steroids, but would avoid for now given recent surgery    Data reviewed today: I reviewed all medications, new labs and imaging results over the last 24 hours. I personally reviewed the left hand XR image(s) showing no acute fractures or dislocations.    Physical Exam   Vital Signs: Temp: 99.1  F (37.3  C) Temp src: Oral BP: 136/53 Pulse: 80   Resp: 16 SpO2: 96 % O2 Device: None (Room air)    Weight: 195 lbs 1.71 oz  Constitutional: Resting in bed, NAD  HEENT: Sclera white, MMM  Respiratory: Breathing non-labored. Lungs CTAB - no wheezes, crackles, or rhonchi  Cardiovascular: Heart RRR, no m/r/g. No pedal edema  GI: +BS, abd soft. Ostomy with green liquid output. HENNY drain with serous fluid.   Skin/Integument: No rash  Musculoskeletal: Normal muscle bulk and tone  Neuro: Alert and appropriate, SANCHEZ  Psych: Calm and cooperative    Data   Recent Labs   Lab 05/06/22  1633 05/06/22  1200 05/06/22  0819 05/06/22  0807 05/05/22  0740 05/05/22  0710 05/04/22  1659 05/04/22  0715 05/03/22  1113 05/02/22  0728 05/01/22  0656 04/30/22  0725   WBC  --   --   --   --   --   --   --   --  7.4 7.8  --   --    HGB  --   --   --   --   --   --   --   --  14.8 13.6  --  11.9   MCV  --   --   --   --   --   --   --   --  104* 103*  --   --    PLT  --   --   --   --   --   --   --  333 389 296   < >  --    INR  --   --   --   --   --  0.98  --   --   --   --   --   --    NA   --   --   --  141  --  138  --  133 133 135  --  139   POTASSIUM  --   --   --  3.4  --  3.5  --  4.2 4.2 3.9  --  3.8   CHLORIDE  --   --   --  115*  --  110*  --  102 102 104  --  111*   CO2  --   --   --  19*  --  23  --  26 25 26  --  25   BUN  --   --   --  21  --  27  --  24 18 11  --  9   CR  --   --   --  0.99  --  1.76*  --  2.68* 2.26* 1.03  --  0.90   ANIONGAP  --   --   --  7  --  5  --  5 6 5  --  3   GISELLE  --   --   --  8.0*  --  8.3*  --  8.7 9.6 9.2  --  8.6   * 107* 130* 124*   < > 122*   < > 116* 114* 113*  --  94   ALBUMIN  --   --   --   --   --  2.4*  --   --  3.2*  --   --   --    PROTTOTAL  --   --   --   --   --  5.7*  --   --  7.1  --   --   --    BILITOTAL  --   --   --   --   --  0.5  --   --  0.8  --   --   --    ALKPHOS  --   --   --   --   --  75  --   --  95  --   --   --    ALT  --   --   --   --   --  39  --   --  57*  --   --   --    AST  --   --   --   --   --  23  --   --  30  --   --   --     < > = values in this interval not displayed.         No results found for this or any previous visit (from the past 24 hour(s)).    Medications     dextrose       parenteral nutrition - ADULT compounded formula       parenteral nutrition - ADULT compounded formula 75 mL/hr at 05/05/22 2148       [Held by provider] amiodarone  200 mg Oral Daily     [Held by provider] amLODIPine  5 mg Oral Daily     [Held by provider] atorvastatin  80 mg Oral At Bedtime     [Held by provider] carvedilol  12.5 mg Oral BID w/meals     enoxaparin ANTICOAGULANT  40 mg Subcutaneous Q24H     [Held by provider] escitalopram  10 mg Oral Daily     hydrALAZINE  5 mg Intravenous Q6H     insulin aspart  1-12 Units Subcutaneous Q4H     [Held by provider] irbesartan  300 mg Oral At Bedtime     levothyroxine  19 mcg Intravenous Daily     [Held by provider] levothyroxine  25 mcg Oral QAM AC     lipids  250 mL Intravenous Once per day on Wed Sat     metoprolol  5 mg Intravenous Q6H     sodium chloride (PF)  10-40 mL  Intracatheter Q7 Days     sodium chloride (PF)  3 mL Intracatheter Q8H     sodium chloride   Intravenous Q4H     [Held by provider] spironolactone  25 mg Oral Daily     [Held by provider] torsemide  20 mg Oral BID     umeclidinium  1 puff Inhalation Daily

## 2022-05-06 NOTE — PROGRESS NOTES
[facility]    Medicine Progress Note - Hospitalist Service    Date of Admission:  4/28/2022    Assessment & Plan        Janet Figueroa is a 71 year old female with complex PMHx including rectal cancer, CAD s/p PCI, paroxysmal a-fib on chronic anticoagulation with apixaban, renal artery stenosis, CKD, prediabetes, and COPD who was admitted to the Colorectal Surgery service on 4/28/2022 for low anterior resection with diverting loop ileostomy. Hospitalist service was consulted for post-op medical management.        Rectal cancer s/p low anterior resection with diverting loop ileostomy on 4/28/2022  * Underwent procedure under GETA by Dr. Connolly, minimal EBL noted  - Post-op cares, pain control, and VTE prophylaxis as per CRS  - 5/2 N/V, NG with significant output noted, HENNY continued, IVF, lovenox - per surgery  - 5/4 over 4 L out of NG - primary increasing fluids as below  - 5/5 Continue NPO, TPN, and NGT per CRS  - 5/6 primary team managing diet, TPN, and NGT - clamping trial today     Left thumb pain and swelling  Patient reports this started couple days ago after trying to push herself up and using that left hand on the hospital bed rail.  No other recent traumas.  She is able to move it with some pain and also says it is hurting at rest.  Does appear swollen.  It is well-perfused.  Of note she is currently not on her PTA DOAC.  Does report a history of gout in that joint but cannot remember many details of treatment etc.  - We will start with x-ray of the area  - As needed ice and analgesia  - Pending x-ray result may warrant orthopedic surgery evaluation    Acute kidney injury - improving/resolved  CKD, stage IIIa  History of fibromuscular dysplasia of the right renal artery  - Creatinine stable ~1 range  - 5/3 - creat 2.26, likely due to decreased PO intake and vomiting  - 5/4 - creat 2.6, ongoing NG losses, BUN up, likely prerenal  - 5/5 - creatinine improving to 1.7, concur with  primary service management with IV fluids  - 5/6 - creat normalized, 0.9. Can likely de-escalate IVF regimen - defer to primary team       Pyuria  Hematuria, Resolved  * UA ordered on 4/30 for hematuria which has resolved (suspect traumatic Gottlieb catheter placement which has since been removed); also showed large LE and 29 wbcs. Patient reported urgency, which seems to be chronic. No fever/leukocytosis noted   - Defer antibiotics for now, 4/30 urine culture showed no growth     Paroxysmal atrial fibrillation   * PTA: carvedilol 12.5 mg BID, apixaban 5 mg BID  - Holding apixaban; resume once cleared by CRS  - On prophylactic enoxaparin per CRS  - 5/2 - NPO per surgery, still with N/V - will have some coverage with metop 5 mg iv q6 for now     CAD, native heart, native vessels s/p PCI (RCA, 11/2019; LCx, 07/2008)  Chronic diastolic CHF  Essential hypertension  * PTA: carvedilol 12.5 mg BID, amlodipine 5 mg daily, irbesartan 300 mg daily, torsemide 20 mg BID, spironolactone 25 mg daily, atorvastatin 80 mg qhs  * Coronary angiogram (01/2020) showed no evidence for recurrent occlusive CAD  * TTE (11/2019) showed EF 55%, mild left atrial enlargement, mild MR, and trace TR  - PTA torsemide and spironolactone to resume when able to take PO  - 5/2 NPO and IVF per CRS  -- HOLD pta meds - prn hydralazine in place  - 5/7 if able to take PO, resume PTA?     COPD  * Chronic and stable on Incruse daily, albuterol prn  * Appears compensated without evidence of exacerbation     Prediabetes  Chronic and stable. No acute needs     Hypothyroidism  Chronic and stable on levothyroxine  - hold for now, ok to miss a day or two, can use IV if necessary in next couple days     Major recurrent depressive disorder with anxiety  Chronic and stable on escitalopram        Diet: parenteral nutrition - ADULT compounded formula  parenteral nutrition - ADULT compounded formula  Clear Liquid Diet    DVT Prophylaxis: Defer to primary service  Gottlieb  Catheter: PRESENT, indication: /GI/GYN Pelvic Procedure, /GI/GYN Pelvic Procedure  Central Lines: PRESENT  PICC Double Lumen 05/04/22 Right Basilic-Site Assessment: WDL  Cardiac Monitoring: None  Code Status: Full Code      Disposition Plan   Expected Discharge: 05/09/2022     Anticipated discharge location: home with family    Delays:     Placement - Homecare  Other (Add Comment)            The patient's care was discussed with the Bedside Nurse and Patient.    Trey Mtz MD  Hospitalist Service  [facility]  Securely message with the Vocera Web Console (learn more here)  Text page via [employer] Paging/Directory         Clinically Significant Risk Factors Present on Admission                    ______________________________________________________________________    Interval History   Patient seen and examined this afternoon.  Doing okay overall however she does have some pain and swelling in her left thumb base.  She reports this started a couple days ago when she was trying to lift her self up and pushing on the bed rail.  No of note she does report a history of gout in that joint as well.  Otherwise she denies fevers, other new pain, or shortness of breath.    Data reviewed today: I reviewed all medications, new labs and imaging results over the last 24 hours. I personally reviewed no images or EKG's today.    Physical Exam   Vital Signs: Temp: 99.3  F (37.4  C) Temp src: Oral BP: (!) 171/73 Pulse: 77   Resp: 16 SpO2: 98 % O2 Device: None (Room air)    Weight: 195 lbs 1.71 oz    Gen: NAD, pleasant  HEENT: EOMI, MMM  Resp: no crackles,  no wheezes, no increased work of resp  CV: S1S2 heard, reg rhythm, reg rate  Abdo: soft, stoma with green output   Ext: calves nontender, well perfused  Neuro: aaox3, CN grossly intact, no facial asymmetry      Data   Recent Labs   Lab 05/06/22  1200 05/06/22  0819 05/06/22  0807 05/05/22  0740 05/05/22  0710 05/04/22  1659 05/04/22  0715  05/03/22  1113 05/02/22  0728 05/01/22  0656 04/30/22  0725   WBC  --   --   --   --   --   --   --  7.4 7.8  --   --    HGB  --   --   --   --   --   --   --  14.8 13.6  --  11.9   MCV  --   --   --   --   --   --   --  104* 103*  --   --    PLT  --   --   --   --   --   --  333 389 296   < >  --    INR  --   --   --   --  0.98  --   --   --   --   --   --    NA  --   --  141  --  138  --  133 133 135  --  139   POTASSIUM  --   --  3.4  --  3.5  --  4.2 4.2 3.9  --  3.8   CHLORIDE  --   --  115*  --  110*  --  102 102 104  --  111*   CO2  --   --  19*  --  23  --  26 25 26  --  25   BUN  --   --  21  --  27  --  24 18 11  --  9   CR  --   --  0.99  --  1.76*  --  2.68* 2.26* 1.03  --  0.90   ANIONGAP  --   --  7  --  5  --  5 6 5  --  3   GISELLE  --   --  8.0*  --  8.3*  --  8.7 9.6 9.2  --  8.6   * 130* 124*   < > 122*   < > 116* 114* 113*  --  94   ALBUMIN  --   --   --   --  2.4*  --   --  3.2*  --   --   --    PROTTOTAL  --   --   --   --  5.7*  --   --  7.1  --   --   --    BILITOTAL  --   --   --   --  0.5  --   --  0.8  --   --   --    ALKPHOS  --   --   --   --  75  --   --  95  --   --   --    ALT  --   --   --   --  39  --   --  57*  --   --   --    AST  --   --   --   --  23  --   --  30  --   --   --     < > = values in this interval not displayed.     No results found for this or any previous visit (from the past 24 hour(s)).

## 2022-05-07 ENCOUNTER — APPOINTMENT (OUTPATIENT)
Dept: GENERAL RADIOLOGY | Facility: CLINIC | Age: 72
DRG: 330 | End: 2022-05-07
Attending: HOSPITALIST
Payer: MEDICARE

## 2022-05-07 ENCOUNTER — APPOINTMENT (OUTPATIENT)
Dept: CT IMAGING | Facility: CLINIC | Age: 72
DRG: 330 | End: 2022-05-07
Attending: COLON & RECTAL SURGERY
Payer: MEDICARE

## 2022-05-07 LAB
ANION GAP SERPL CALCULATED.3IONS-SCNC: 8 MMOL/L (ref 3–14)
BUN SERPL-MCNC: 16 MG/DL (ref 7–30)
CALCIUM SERPL-MCNC: 8.6 MG/DL (ref 8.5–10.1)
CHLORIDE BLD-SCNC: 115 MMOL/L (ref 94–109)
CO2 SERPL-SCNC: 16 MMOL/L (ref 20–32)
CREAT SERPL-MCNC: 0.65 MG/DL (ref 0.52–1.04)
ERYTHROCYTE [DISTWIDTH] IN BLOOD BY AUTOMATED COUNT: 12.9 % (ref 10–15)
GFR SERPL CREATININE-BSD FRML MDRD: >90 ML/MIN/1.73M2
GLUCOSE BLD-MCNC: 125 MG/DL (ref 70–99)
GLUCOSE BLDC GLUCOMTR-MCNC: 111 MG/DL (ref 70–99)
GLUCOSE BLDC GLUCOMTR-MCNC: 122 MG/DL (ref 70–99)
GLUCOSE BLDC GLUCOMTR-MCNC: 123 MG/DL (ref 70–99)
GLUCOSE BLDC GLUCOMTR-MCNC: 124 MG/DL (ref 70–99)
GLUCOSE BLDC GLUCOMTR-MCNC: 124 MG/DL (ref 70–99)
GLUCOSE BLDC GLUCOMTR-MCNC: 125 MG/DL (ref 70–99)
HCT VFR BLD AUTO: 39.4 % (ref 35–47)
HGB BLD-MCNC: 13 G/DL (ref 11.7–15.7)
MAGNESIUM SERPL-MCNC: 1.8 MG/DL (ref 1.6–2.3)
MCH RBC QN AUTO: 33.6 PG (ref 26.5–33)
MCHC RBC AUTO-ENTMCNC: 33 G/DL (ref 31.5–36.5)
MCV RBC AUTO: 102 FL (ref 78–100)
PHOSPHATE SERPL-MCNC: 2.4 MG/DL (ref 2.5–4.5)
PLATELET # BLD AUTO: 332 10E3/UL (ref 150–450)
PLATELET # BLD AUTO: 332 10E3/UL (ref 150–450)
POTASSIUM BLD-SCNC: 3.9 MMOL/L (ref 3.4–5.3)
RBC # BLD AUTO: 3.87 10E6/UL (ref 3.8–5.2)
SODIUM SERPL-SCNC: 139 MMOL/L (ref 133–144)
WBC # BLD AUTO: 9.9 10E3/UL (ref 4–11)

## 2022-05-07 PROCEDURE — 250N000009 HC RX 250: Performed by: HOSPITALIST

## 2022-05-07 PROCEDURE — 80048 BASIC METABOLIC PNL TOTAL CA: CPT | Performed by: PHYSICIAN ASSISTANT

## 2022-05-07 PROCEDURE — 250N000011 HC RX IP 250 OP 636: Performed by: COLON & RECTAL SURGERY

## 2022-05-07 PROCEDURE — 250N000009 HC RX 250: Performed by: PHYSICIAN ASSISTANT

## 2022-05-07 PROCEDURE — 250N000011 HC RX IP 250 OP 636: Performed by: INTERNAL MEDICINE

## 2022-05-07 PROCEDURE — 250N000013 HC RX MED GY IP 250 OP 250 PS 637: Performed by: COLON & RECTAL SURGERY

## 2022-05-07 PROCEDURE — 85049 AUTOMATED PLATELET COUNT: CPT | Performed by: COLON & RECTAL SURGERY

## 2022-05-07 PROCEDURE — 120N000001 HC R&B MED SURG/OB

## 2022-05-07 PROCEDURE — 999N000190 HC STATISTIC VAT ROUNDS

## 2022-05-07 PROCEDURE — 74177 CT ABD & PELVIS W/CONTRAST: CPT | Mod: MG

## 2022-05-07 PROCEDURE — 83735 ASSAY OF MAGNESIUM: CPT | Performed by: PHYSICIAN ASSISTANT

## 2022-05-07 PROCEDURE — 36416 COLLJ CAPILLARY BLOOD SPEC: CPT | Performed by: COLON & RECTAL SURGERY

## 2022-05-07 PROCEDURE — 99233 SBSQ HOSP IP/OBS HIGH 50: CPT | Performed by: INTERNAL MEDICINE

## 2022-05-07 PROCEDURE — 258N000003 HC RX IP 258 OP 636: Performed by: COLON & RECTAL SURGERY

## 2022-05-07 PROCEDURE — 250N000009 HC RX 250: Performed by: COLON & RECTAL SURGERY

## 2022-05-07 PROCEDURE — 73140 X-RAY EXAM OF FINGER(S): CPT | Mod: LT

## 2022-05-07 PROCEDURE — 250N000013 HC RX MED GY IP 250 OP 250 PS 637: Performed by: INTERNAL MEDICINE

## 2022-05-07 PROCEDURE — 85027 COMPLETE CBC AUTOMATED: CPT | Performed by: STUDENT IN AN ORGANIZED HEALTH CARE EDUCATION/TRAINING PROGRAM

## 2022-05-07 PROCEDURE — 84100 ASSAY OF PHOSPHORUS: CPT | Performed by: PHYSICIAN ASSISTANT

## 2022-05-07 PROCEDURE — 250N000011 HC RX IP 250 OP 636: Performed by: HOSPITALIST

## 2022-05-07 PROCEDURE — 250N000013 HC RX MED GY IP 250 OP 250 PS 637: Performed by: NURSE PRACTITIONER

## 2022-05-07 PROCEDURE — 250N000009 HC RX 250

## 2022-05-07 RX ORDER — ONDANSETRON 2 MG/ML
4 INJECTION INTRAMUSCULAR; INTRAVENOUS ONCE
Status: DISCONTINUED | OUTPATIENT
Start: 2022-05-07 | End: 2022-05-17 | Stop reason: HOSPADM

## 2022-05-07 RX ORDER — IOPAMIDOL 755 MG/ML
103 INJECTION, SOLUTION INTRAVASCULAR ONCE
Status: COMPLETED | OUTPATIENT
Start: 2022-05-07 | End: 2022-05-07

## 2022-05-07 RX ORDER — COLCHICINE 0.6 MG/1
0.6 TABLET ORAL 2 TIMES DAILY
Status: DISCONTINUED | OUTPATIENT
Start: 2022-05-08 | End: 2022-05-07

## 2022-05-07 RX ORDER — COLCHICINE 0.6 MG/1
0.6 TABLET ORAL DAILY
Status: DISCONTINUED | OUTPATIENT
Start: 2022-05-07 | End: 2022-05-07

## 2022-05-07 RX ORDER — COLCHICINE 0.6 MG/1
0.6 TABLET ORAL ONCE
Status: COMPLETED | OUTPATIENT
Start: 2022-05-07 | End: 2022-05-07

## 2022-05-07 RX ADMIN — HYDROMORPHONE HYDROCHLORIDE 0.2 MG: 0.2 INJECTION, SOLUTION INTRAMUSCULAR; INTRAVENOUS; SUBCUTANEOUS at 09:20

## 2022-05-07 RX ADMIN — HYDRALAZINE HYDROCHLORIDE 5 MG: 20 INJECTION INTRAMUSCULAR; INTRAVENOUS at 04:34

## 2022-05-07 RX ADMIN — ENOXAPARIN SODIUM 40 MG: 40 INJECTION SUBCUTANEOUS at 17:11

## 2022-05-07 RX ADMIN — METOPROLOL TARTRATE 5 MG: 5 INJECTION INTRAVENOUS at 11:04

## 2022-05-07 RX ADMIN — HYDRALAZINE HYDROCHLORIDE 5 MG: 20 INJECTION INTRAMUSCULAR; INTRAVENOUS at 17:11

## 2022-05-07 RX ADMIN — HYDRALAZINE HYDROCHLORIDE 5 MG: 20 INJECTION INTRAMUSCULAR; INTRAVENOUS at 21:54

## 2022-05-07 RX ADMIN — METOPROLOL TARTRATE 5 MG: 5 INJECTION INTRAVENOUS at 17:11

## 2022-05-07 RX ADMIN — SODIUM CHLORIDE 79 ML: 900 INJECTION INTRAVENOUS at 15:28

## 2022-05-07 RX ADMIN — PROCHLORPERAZINE EDISYLATE 5 MG: 5 INJECTION INTRAMUSCULAR; INTRAVENOUS at 12:35

## 2022-05-07 RX ADMIN — UMECLIDINIUM 1 PUFF: 62.5 AEROSOL, POWDER ORAL at 09:25

## 2022-05-07 RX ADMIN — IOPAMIDOL 103 ML: 755 INJECTION, SOLUTION INTRAVENOUS at 15:21

## 2022-05-07 RX ADMIN — METOPROLOL TARTRATE 5 MG: 5 INJECTION INTRAVENOUS at 21:54

## 2022-05-07 RX ADMIN — ONDANSETRON 4 MG: 2 INJECTION INTRAMUSCULAR; INTRAVENOUS at 13:39

## 2022-05-07 RX ADMIN — LEVOTHYROXINE SODIUM 19 MCG: 20 INJECTION, SOLUTION INTRAVENOUS at 06:16

## 2022-05-07 RX ADMIN — ONDANSETRON 4 MG: 2 INJECTION INTRAMUSCULAR; INTRAVENOUS at 02:13

## 2022-05-07 RX ADMIN — HYDRALAZINE HYDROCHLORIDE 5 MG: 20 INJECTION INTRAMUSCULAR; INTRAVENOUS at 11:12

## 2022-05-07 RX ADMIN — ONDANSETRON 4 MG: 2 INJECTION INTRAMUSCULAR; INTRAVENOUS at 09:14

## 2022-05-07 RX ADMIN — PROCHLORPERAZINE EDISYLATE 5 MG: 5 INJECTION INTRAMUSCULAR; INTRAVENOUS at 18:43

## 2022-05-07 RX ADMIN — HYDROMORPHONE HYDROCHLORIDE 0.2 MG: 0.2 INJECTION, SOLUTION INTRAMUSCULAR; INTRAVENOUS; SUBCUTANEOUS at 18:43

## 2022-05-07 RX ADMIN — METOPROLOL TARTRATE 5 MG: 5 INJECTION INTRAVENOUS at 04:40

## 2022-05-07 RX ADMIN — COLCHICINE 0.6 MG: 0.6 TABLET, FILM COATED ORAL at 09:31

## 2022-05-07 RX ADMIN — SODIUM CHLORIDE, PRESERVATIVE FREE: 5 INJECTION INTRAVENOUS at 05:42

## 2022-05-07 RX ADMIN — AMLODIPINE BESYLATE 5 MG: 5 TABLET ORAL at 09:08

## 2022-05-07 RX ADMIN — ONDANSETRON 4 MG: 2 INJECTION INTRAMUSCULAR; INTRAVENOUS at 22:12

## 2022-05-07 RX ADMIN — HYDROMORPHONE HYDROCHLORIDE 0.2 MG: 0.2 INJECTION, SOLUTION INTRAMUSCULAR; INTRAVENOUS; SUBCUTANEOUS at 02:20

## 2022-05-07 RX ADMIN — CARVEDILOL 12.5 MG: 12.5 TABLET, FILM COATED ORAL at 09:08

## 2022-05-07 RX ADMIN — AMIODARONE HYDROCHLORIDE 200 MG: 200 TABLET ORAL at 09:08

## 2022-05-07 RX ADMIN — ESCITALOPRAM OXALATE 10 MG: 10 TABLET ORAL at 09:08

## 2022-05-07 RX ADMIN — SODIUM CHLORIDE, PRESERVATIVE FREE: 5 INJECTION INTRAVENOUS at 00:30

## 2022-05-07 RX ADMIN — I.V. FAT EMULSION 250 ML: 20 EMULSION INTRAVENOUS at 21:33

## 2022-05-07 RX ADMIN — MAGNESIUM SULFATE HEPTAHYDRATE: 500 INJECTION, SOLUTION INTRAMUSCULAR; INTRAVENOUS at 21:33

## 2022-05-07 ASSESSMENT — ACTIVITIES OF DAILY LIVING (ADL)
ADLS_ACUITY_SCORE: 8
ADLS_ACUITY_SCORE: 10
ADLS_ACUITY_SCORE: 8
ADLS_ACUITY_SCORE: 10
ADLS_ACUITY_SCORE: 8
ADLS_ACUITY_SCORE: 10
ADLS_ACUITY_SCORE: 10
ADLS_ACUITY_SCORE: 8
ADLS_ACUITY_SCORE: 10
ADLS_ACUITY_SCORE: 8
ADLS_ACUITY_SCORE: 8
ADLS_ACUITY_SCORE: 10
ADLS_ACUITY_SCORE: 10
ADLS_ACUITY_SCORE: 8

## 2022-05-07 NOTE — PLAN OF CARE
Goal Outcome Evaluation: VSS on RA ex elevated BP. A&Ox4. Gottlieb patent with clear yellow output. Ostomy WNL with liquid brown output. Abdomen distended with increased belching when up in chair. BS hypoactive. C/O nausea with Zofran given x1, effective. Pain managed with PRN dilaudid x1. Picc with blood return noted. TPN & NS infusing. Continue to monitor.    Plan of Care Reviewed With: patient     Overall Patient Progress: improving

## 2022-05-07 NOTE — PROGRESS NOTES
"Colon & Rectal Surgery Progress Note             Interval History:   Postop Day #: 9 robotic LAR with DI now with resolving ileus  NGT removed yesterday. Had 1 episode of emesis last night and repeated small volume episodes of bilious nonbloody emesis this morning. She has pain throughout her abdomen which is manageable. She continues to pass liquid stool and gas per stoma. HENNY drain leaking around site.                Medications:   I have reviewed this patient's current medications               Physical Exam:   Blood pressure (!) 155/68, pulse 75, temperature 98.6  F (37  C), temperature source Oral, resp. rate 18, height 1.6 m (5' 3\"), weight 93.3 kg (205 lb 11 oz), SpO2 97 %.      Intake/Output Summary (Last 24 hours) at 5/7/2022 1357  Last data filed at 5/7/2022 1221  Gross per 24 hour   Intake 2736 ml   Output 2970 ml   Net -234 ml         GEN:  alert  ABD:  Soft with bruising, stoma output green liquid, HENNY serous         Data:        Recent Labs   Lab 05/07/22  1243 05/04/22  0715 05/03/22  1113 05/02/22  0728   WBC  --   --  7.4 7.8   HGB  --   --  14.8 13.6   HCT  --   --  44.4 41.1   MCV  --   --  104* 103*    333 389 296     Recent Labs   Lab 05/07/22  1243 05/07/22  1213 05/07/22  0826 05/06/22  0819 05/06/22  0807 05/05/22  0740 05/05/22  0710     --   --   --  141  --  138   POTASSIUM 3.9  --   --   --  3.4  --  3.5   CHLORIDE 115*  --   --   --  115*  --  110*   CO2 16*  --   --   --  19*  --  23   ANIONGAP 8  --   --   --  7  --  5   * 124* 123*   < > 124*   < > 122*   BUN 16  --   --   --  21  --  27   CR 0.65  --   --   --  0.99  --  1.76*   GFRESTIMATED >90  --   --   --  60*  --  30*   GISELLE 8.6  --   --   --  8.0*  --  8.3*    < > = values in this interval not displayed.              Assessment and Plan:   POD 9 robot LAR with DLI. Course complicated by ileus. NGT removed yesterday with recurrent vomiting.  1) Ordered CBC in addition to BMP to look for leukyctosis and CT " Abdomen/Pelvis with IV contrast to rule out intra-abdominal infection as source of ongoing ileus.  2) continue TPN, NPO, glucose control. Will hold off on NGT currently. If persistent emesis, would have to consider replacing it.  3) encourage ambulation  4) continue lovenox, will need to restart Eliquis at some point once ileus resolves.   5) stoma care and teaching.  6) strict I and O with documentation     Discussed with Dr. Taveras.    Shannan Heredia MD   Colon & Rectal Surgery Fellow  [facility]  Office Phone # [phone]

## 2022-05-07 NOTE — PHARMACY
TPN formula evaluated based on today s labs results.      The following changes have been made:  Phosphorus: increased to 20 mEq .  Calcium slightly elevated (corercted to 9.9 mg/dL), will keep current dose and monitor lab in am.    Pharmacy will continue to follow and adjust as appropriate.

## 2022-05-07 NOTE — PLAN OF CARE
Pt is A&O x4. VSS on RA. PICC without blood return- lab completed blood draws. Pt reports nausea unrelieved with zofran and compazine. Pt vomited small amounts of bile x5 this shift. Abdomen distended. MD aware. HENNY leaking and dressing changed x2 this shift. Ostomy red and protruding- passing gas and some brown liquid output. Dilaudid given x1. Plan to complete CT. Continue to monitor.

## 2022-05-08 LAB
ANION GAP SERPL CALCULATED.3IONS-SCNC: 8 MMOL/L (ref 3–14)
BUN SERPL-MCNC: 15 MG/DL (ref 7–30)
CALCIUM SERPL-MCNC: 9 MG/DL (ref 8.5–10.1)
CHLORIDE BLD-SCNC: 113 MMOL/L (ref 94–109)
CO2 SERPL-SCNC: 19 MMOL/L (ref 20–32)
CREAT SERPL-MCNC: 0.64 MG/DL (ref 0.52–1.04)
GFR SERPL CREATININE-BSD FRML MDRD: >90 ML/MIN/1.73M2
GLUCOSE BLD-MCNC: 126 MG/DL (ref 70–99)
GLUCOSE BLDC GLUCOMTR-MCNC: 115 MG/DL (ref 70–99)
GLUCOSE BLDC GLUCOMTR-MCNC: 119 MG/DL (ref 70–99)
GLUCOSE BLDC GLUCOMTR-MCNC: 122 MG/DL (ref 70–99)
GLUCOSE BLDC GLUCOMTR-MCNC: 125 MG/DL (ref 70–99)
GLUCOSE BLDC GLUCOMTR-MCNC: 126 MG/DL (ref 70–99)
GLUCOSE BLDC GLUCOMTR-MCNC: 134 MG/DL (ref 70–99)
MAGNESIUM SERPL-MCNC: 1.8 MG/DL (ref 1.6–2.3)
MAGNESIUM SERPL-MCNC: 1.9 MG/DL (ref 1.6–2.3)
PHOSPHATE SERPL-MCNC: 2.7 MG/DL (ref 2.5–4.5)
PHOSPHATE SERPL-MCNC: 2.7 MG/DL (ref 2.5–4.5)
POTASSIUM BLD-SCNC: 3.1 MMOL/L (ref 3.4–5.3)
POTASSIUM BLD-SCNC: 3.3 MMOL/L (ref 3.4–5.3)
POTASSIUM BLD-SCNC: 3.6 MMOL/L (ref 3.4–5.3)
SODIUM SERPL-SCNC: 140 MMOL/L (ref 133–144)

## 2022-05-08 PROCEDURE — 84100 ASSAY OF PHOSPHORUS: CPT | Performed by: PHYSICIAN ASSISTANT

## 2022-05-08 PROCEDURE — 120N000001 HC R&B MED SURG/OB

## 2022-05-08 PROCEDURE — 250N000013 HC RX MED GY IP 250 OP 250 PS 637: Performed by: NURSE PRACTITIONER

## 2022-05-08 PROCEDURE — 250N000009 HC RX 250: Performed by: PHYSICIAN ASSISTANT

## 2022-05-08 PROCEDURE — 250N000011 HC RX IP 250 OP 636: Performed by: HOSPITALIST

## 2022-05-08 PROCEDURE — 250N000011 HC RX IP 250 OP 636: Performed by: INTERNAL MEDICINE

## 2022-05-08 PROCEDURE — 999N000190 HC STATISTIC VAT ROUNDS

## 2022-05-08 PROCEDURE — 84132 ASSAY OF SERUM POTASSIUM: CPT | Performed by: INTERNAL MEDICINE

## 2022-05-08 PROCEDURE — 80048 BASIC METABOLIC PNL TOTAL CA: CPT | Performed by: PHYSICIAN ASSISTANT

## 2022-05-08 PROCEDURE — 36415 COLL VENOUS BLD VENIPUNCTURE: CPT | Performed by: PHYSICIAN ASSISTANT

## 2022-05-08 PROCEDURE — 99232 SBSQ HOSP IP/OBS MODERATE 35: CPT | Performed by: INTERNAL MEDICINE

## 2022-05-08 PROCEDURE — 84100 ASSAY OF PHOSPHORUS: CPT | Performed by: INTERNAL MEDICINE

## 2022-05-08 PROCEDURE — 36415 COLL VENOUS BLD VENIPUNCTURE: CPT | Performed by: INTERNAL MEDICINE

## 2022-05-08 PROCEDURE — 83735 ASSAY OF MAGNESIUM: CPT | Performed by: PHYSICIAN ASSISTANT

## 2022-05-08 PROCEDURE — 250N000011 HC RX IP 250 OP 636: Performed by: COLON & RECTAL SURGERY

## 2022-05-08 PROCEDURE — 83735 ASSAY OF MAGNESIUM: CPT | Performed by: INTERNAL MEDICINE

## 2022-05-08 PROCEDURE — 250N000013 HC RX MED GY IP 250 OP 250 PS 637: Performed by: COLON & RECTAL SURGERY

## 2022-05-08 PROCEDURE — 250N000009 HC RX 250: Performed by: HOSPITALIST

## 2022-05-08 PROCEDURE — 250N000013 HC RX MED GY IP 250 OP 250 PS 637: Performed by: INTERNAL MEDICINE

## 2022-05-08 RX ORDER — POTASSIUM CHLORIDE 1500 MG/1
40 TABLET, EXTENDED RELEASE ORAL ONCE
Status: COMPLETED | OUTPATIENT
Start: 2022-05-08 | End: 2022-05-08

## 2022-05-08 RX ORDER — METOCLOPRAMIDE HYDROCHLORIDE 5 MG/ML
5 INJECTION INTRAMUSCULAR; INTRAVENOUS EVERY 8 HOURS PRN
Status: DISCONTINUED | OUTPATIENT
Start: 2022-05-08 | End: 2022-05-17 | Stop reason: HOSPADM

## 2022-05-08 RX ORDER — HYDROMORPHONE HCL IN WATER/PF 6 MG/30 ML
0.2 PATIENT CONTROLLED ANALGESIA SYRINGE INTRAVENOUS EVERY 4 HOURS PRN
Status: DISCONTINUED | OUTPATIENT
Start: 2022-05-08 | End: 2022-05-12

## 2022-05-08 RX ADMIN — HYDRALAZINE HYDROCHLORIDE 5 MG: 20 INJECTION INTRAMUSCULAR; INTRAVENOUS at 03:30

## 2022-05-08 RX ADMIN — AMLODIPINE BESYLATE 5 MG: 5 TABLET ORAL at 09:14

## 2022-05-08 RX ADMIN — CARVEDILOL 12.5 MG: 12.5 TABLET, FILM COATED ORAL at 17:53

## 2022-05-08 RX ADMIN — ESCITALOPRAM OXALATE 10 MG: 10 TABLET ORAL at 09:14

## 2022-05-08 RX ADMIN — PROCHLORPERAZINE EDISYLATE 5 MG: 5 INJECTION INTRAMUSCULAR; INTRAVENOUS at 03:23

## 2022-05-08 RX ADMIN — POTASSIUM CHLORIDE 40 MEQ: 1500 TABLET, EXTENDED RELEASE ORAL at 18:51

## 2022-05-08 RX ADMIN — MAGNESIUM SULFATE HEPTAHYDRATE: 500 INJECTION, SOLUTION INTRAMUSCULAR; INTRAVENOUS at 20:53

## 2022-05-08 RX ADMIN — METOPROLOL TARTRATE 5 MG: 5 INJECTION INTRAVENOUS at 04:25

## 2022-05-08 RX ADMIN — HYDRALAZINE HYDROCHLORIDE 5 MG: 20 INJECTION INTRAMUSCULAR; INTRAVENOUS at 21:45

## 2022-05-08 RX ADMIN — HYDROMORPHONE HYDROCHLORIDE 0.2 MG: 0.2 INJECTION, SOLUTION INTRAMUSCULAR; INTRAVENOUS; SUBCUTANEOUS at 09:35

## 2022-05-08 RX ADMIN — UMECLIDINIUM 1 PUFF: 62.5 AEROSOL, POWDER ORAL at 09:17

## 2022-05-08 RX ADMIN — HYDRALAZINE HYDROCHLORIDE 5 MG: 20 INJECTION INTRAMUSCULAR; INTRAVENOUS at 10:21

## 2022-05-08 RX ADMIN — ENOXAPARIN SODIUM 40 MG: 40 INJECTION SUBCUTANEOUS at 17:53

## 2022-05-08 RX ADMIN — METOPROLOL TARTRATE 5 MG: 5 INJECTION INTRAVENOUS at 10:25

## 2022-05-08 RX ADMIN — ONDANSETRON 4 MG: 2 INJECTION INTRAMUSCULAR; INTRAVENOUS at 06:35

## 2022-05-08 RX ADMIN — CARVEDILOL 12.5 MG: 12.5 TABLET, FILM COATED ORAL at 09:14

## 2022-05-08 RX ADMIN — AMIODARONE HYDROCHLORIDE 200 MG: 200 TABLET ORAL at 09:14

## 2022-05-08 RX ADMIN — HYDROMORPHONE HYDROCHLORIDE 0.2 MG: 0.2 INJECTION, SOLUTION INTRAMUSCULAR; INTRAVENOUS; SUBCUTANEOUS at 00:29

## 2022-05-08 RX ADMIN — HYDROMORPHONE HYDROCHLORIDE 0.2 MG: 0.2 INJECTION, SOLUTION INTRAMUSCULAR; INTRAVENOUS; SUBCUTANEOUS at 06:41

## 2022-05-08 RX ADMIN — POTASSIUM CHLORIDE 40 MEQ: 1500 TABLET, EXTENDED RELEASE ORAL at 13:25

## 2022-05-08 RX ADMIN — LEVOTHYROXINE SODIUM 25 MCG: 25 TABLET ORAL at 09:14

## 2022-05-08 ASSESSMENT — ACTIVITIES OF DAILY LIVING (ADL)
ADLS_ACUITY_SCORE: 10

## 2022-05-08 NOTE — PROGRESS NOTES
[facility]    Medicine Progress Note - Hospitalist Service       Date of Admission:  4/28/2022    Assessment & Plan   Janet Figueroa is a 71 year old female with complex PMHx including rectal cancer, CAD s/p PCI, paroxysmal a-fib on chronic anticoagulation with apixaban, renal artery stenosis, CKD, prediabetes, and COPD who was admitted to the Colorectal Surgery service on 4/28/2022 for low anterior resection with diverting loop ileostomy. Hospitalist service was consulted for post-op medical management. Hospital course has been complicated by prolonged post-op ileus      Rectal cancer s/p low anterior resection with diverting loop ileostomy on 4/28/2022  Post-operative ileus  * Underwent procedure under GETA by Dr. Connolly, minimal EBL noted  * Developed acute abdominal pain with nausea/vomiting on 5/2. NG tube placed.    * 5/6: NG tube removed with recurrence of nausea/vomiting  * 5/7: CT abd/pelvis showed small bowel obstruction  - Currently NPO as per CRS  - On TPN for supplemental nutrition as per CRS  - Currently on enoxaparin for VTE ppx, plan to resume home apixaban per CRS once ileus resolves     Left thumb pain and swelling, suspect sprain, Improved  Developed persistent left thumb pain and swelling during this hospitalization after trying to push herself up. Left hand XR (5/6) showed no acute fractures or dislocations. Suspect sprain - pain and swelling has improved with supportive cares  - Continue supportive cares: RICE, pain meds prn    Non-anion gap metabolic acidosis  Due to GI losses  - Monitor for now, repeat BMP in AM    Hypokalemia  Due to GI losses  - Potassium replacement protocol ordered  - Check Mg, Phos; replace per protocol     KAMRON on CKD, stage IIIa, Resolved  History of fibromuscular dysplasia of the right renal artery  * Creatinine increased up to 2.68 from baseline ~1 in the setting of ileus with nausea/vomiting and decreased PO intake; corrected with IV  fluids and TPN  - Creatinine now stable ~0.6 range      Pyuria  Hematuria, Resolved  * UA ordered on 4/30 for hematuria which has resolved (suspect traumatic Gottlieb catheter placement which has since been removed); also showed large LE and 29 wbcs. Patient reported urgency, which seems to be chronic. No fever/leukocytosis noted, and urine culture returned negative. Antibiotics were deferred     Paroxysmal atrial fibrillation   * PTA: carvedilol 12.5 mg BID, apixaban 5 mg BID  - Continues on PTA carvedilol  - Holding apixaban in setting of ileus     CAD, native heart, native vessels s/p PCI (RCA, 11/2019; LCx, 07/2008)  Chronic diastolic CHF  Essential hypertension  * PTA: carvedilol 12.5 mg BID, amlodipine 5 mg daily, irbesartan 300 mg daily, torsemide 20 mg BID, spironolactone 25 mg daily, atorvastatin 80 mg qhs  * Coronary angiogram (01/2020) showed no evidence for recurrent occlusive CAD  * TTE (11/2019) showed EF 55%, mild left atrial enlargement, mild MR, and trace TR  - Continues on PTA carvedilol and amlodipine  - Continue holding irbesartan and home diuretics until taking more PO  - Continue holding atorvastatin for now  - Hydralazine prn      COPD  * Chronic and stable on Incruse daily, albuterol prn  * Appears compensated without evidence of exacerbation     Prediabetes  Chronic and stable  - High SSI available, but blood sugars have been acceptable     Hypothyroidism  Chronic and stable on levothyroxine     Major recurrent depressive disorder with anxiety  Chronic and stable on escitalopram     Diet: NPO for Medical/Clinical Reasons Except for: Meds, Ice Chips, NPO but receiving PN  parenteral nutrition - ADULT compounded formula    DVT Prophylaxis: Enoxaparin (Lovenox) SQ  Gottlieb Catheter: PRESENT, indication: /GI/GYN Pelvic Procedure, /GI/GYN Pelvic Procedure  Code Status: Full Code         Disposition: Expected discharge as per CRS    The patient's care was discussed with the Patient.    Eda Robb  MD Darryl  Hospitalist Service  [facility]  Contact information available via [facility] Paging/Directory    ______________________________________________________________________    Interval History   Persistent nausea/vomiting overnight; made NPO by CRS. Left hand pain, swelling, and mobility improving. Reports minimal nausea this morning, no vomiting.    Data reviewed today: I reviewed all medications, new labs and imaging results over the last 24 hours. I personally reviewed the left hand XR image(s) showing no acute fractures or dislocations.    Physical Exam   Vital Signs: Temp: 98.6  F (37  C) Temp src: Axillary BP: (!) 169/73 Pulse: 75   Resp: 18 SpO2: 97 % O2 Device: None (Room air)    Weight: 205 lbs 11.03 oz  Constitutional: Resting in bed, NAD  HEENT: Sclera white, MMM  Respiratory: Breathing non-labored. Lungs CTAB - no wheezes, crackles, or rhonchi  Cardiovascular: Heart RRR, no m/r/g. No pedal edema  GI: +BS, abd soft. Ostomy with green liquid output. HENNY drain with serous fluid.   Skin/Integument: No rash   Musculoskeletal: Normal muscle bulk and tone  Neuro: Alert and appropriate, SANCHEZ  Psych: Calm and cooperative    Data   Recent Labs   Lab 05/08/22  0909 05/08/22  0628 05/08/22  0336 05/07/22  1608 05/07/22  1243 05/06/22  0819 05/06/22  0807 05/05/22  0740 05/05/22  0710 05/04/22  1659 05/04/22  0715 05/03/22  1113 05/02/22  0728   WBC  --   --   --   --  9.9  --   --   --   --   --   --  7.4 7.8   HGB  --   --   --   --  13.0  --   --   --   --   --   --  14.8 13.6   MCV  --   --   --   --  102*  --   --   --   --   --   --  104* 103*   PLT  --   --   --   --  332  332  --   --   --   --   --  333 389 296   INR  --   --   --   --   --   --   --   --  0.98  --   --   --   --    NA  --  140  --   --  139  --  141  --  138  --  133 133 135   POTASSIUM  --  3.1*  --   --  3.9  --  3.4  --  3.5  --  4.2 4.2 3.9   CHLORIDE  --  113*  --   --  115*  --  115*  --  110*  --  102  102 104   CO2  --  19*  --   --  16*  --  19*  --  23  --  26 25 26   BUN  --  15  --   --  16  --  21  --  27  --  24 18 11   CR  --  0.64  --   --  0.65  --  0.99  --  1.76*  --  2.68* 2.26* 1.03   ANIONGAP  --  8  --   --  8  --  7  --  5  --  5 6 5   GISELLE  --  9.0  --   --  8.6  --  8.0*  --  8.3*  --  8.7 9.6 9.2   * 126* 134*   < > 125*   < > 124*   < > 122*   < > 116* 114* 113*   ALBUMIN  --   --   --   --   --   --   --   --  2.4*  --   --  3.2*  --    PROTTOTAL  --   --   --   --   --   --   --   --  5.7*  --   --  7.1  --    BILITOTAL  --   --   --   --   --   --   --   --  0.5  --   --  0.8  --    ALKPHOS  --   --   --   --   --   --   --   --  75  --   --  95  --    ALT  --   --   --   --   --   --   --   --  39  --   --  57*  --    AST  --   --   --   --   --   --   --   --  23  --   --  30  --     < > = values in this interval not displayed.         Recent Results (from the past 24 hour(s))   CT Abdomen Pelvis w Contrast    Narrative    EXAM: CT ABDOMEN PELVIS W CONTRAST  LOCATION: [facility]  DATE/TIME: 5/7/2022 3:39 PM    INDICATION: Abdominal abscess/infection suspected.  COMPARISON: 2/18/2022.  TECHNIQUE: CT scan of the abdomen and pelvis was performed following injection of IV contrast. Multiplanar reformats were obtained. Dose reduction techniques were used.  CONTRAST: 103 mL Isovue 370.    FINDINGS:   LOWER CHEST: Mildly enlarged heart. No pericardial effusion.    HEPATOBILIARY: Unchanged fat-containing hypodensity in the dome of the liver.    PANCREAS: Normal.    SPLEEN: Normal.    ADRENAL GLANDS: Normal.    KIDNEYS/BLADDER: Normal.    BOWEL: Right lower quadrant ileostomy. Small drain in situ. Small volume free fluid. No free peritoneal air. Extensive subcutaneous emphysema, however, is noted along the left flank. Dilated distended loops of small bowel with relative transition point   seen in the right upper quadrant of the abdomen, consistent with small bowel  obstruction.    LYMPH NODES: Normal.    VASCULATURE: No aneurysm. Moderate atherosclerotic vascular disease.    PELVIC ORGANS: Normal.    MUSCULOSKELETAL: Status post pinning of the left hip. Mild to moderate multilevel discogenic degenerative change.      Impression    IMPRESSION:   1.  Status post right lower quadrant ileostomy. Small volume free fluid and drain seen in situ.  2.  Small bowel obstruction.  3.  Trace bilateral pleural effusions.  4.  Unchanged likely benign fat-containing hepatic dome hypodensity, favored to represent a pseudolipoma or angiomyolipoma, adenoma or FNH less likely. Again recommended is 6 month follow-up from incidental CT performed 2/18/2022.       Medications     dextrose       parenteral nutrition - ADULT compounded formula 75 mL/hr at 05/08/22 0000       amiodarone  200 mg Oral Daily     amLODIPine  5 mg Oral Daily     [Held by provider] atorvastatin  80 mg Oral At Bedtime     carvedilol  12.5 mg Oral BID w/meals     enoxaparin ANTICOAGULANT  40 mg Subcutaneous Q24H     escitalopram  10 mg Oral Daily     hydrALAZINE  5 mg Intravenous Q6H     insulin aspart  1-12 Units Subcutaneous Q4H     [Held by provider] irbesartan  300 mg Oral At Bedtime     levothyroxine  25 mcg Oral QAM AC     lipids  250 mL Intravenous Once per day on Wed Sat     metoprolol  5 mg Intravenous Q6H     ondansetron  4 mg Intravenous Once     sodium chloride (PF)  10-40 mL Intracatheter Q7 Days     sodium chloride (PF)  3 mL Intracatheter Q8H     sodium chloride   Intravenous Q4H     [Held by provider] spironolactone  25 mg Oral Daily     [Held by provider] torsemide  20 mg Oral BID     umeclidinium  1 puff Inhalation Daily

## 2022-05-08 NOTE — PLAN OF CARE
Goal Outcome Evaluation: VSS on RA with ex to elevated BP. PT nauseated throughout the night, essential oils, Compazine & Zofran x1. 1 emesis at 30cc, green liquid in color. Pain managed with dilaudid x2, effective. Encouraged ambulation, pt declined stating her knee becomes agitated. Gottlieb patent with AOP. BS hypoactive. Ostomy with liquid brown output. HENNY serosanguinous output and leaking at incision, dressing changed x1. Abd incisions approximated, robert. Continue to monitor nausea for potential NG tube placement.          Overall Patient Progress: no change

## 2022-05-08 NOTE — PROGRESS NOTES
Care Transitions Note:  Writer called [facility] to let them know pt is still in the hospital so no need for visit today.  CC will update Lori when discharge date is known.  CHINEDU

## 2022-05-08 NOTE — PROGRESS NOTES
"Colon & Rectal Surgery Progress Note             Interval History:   Postop Day #: 10 robotic LAR with DI     Several small episodes of small volume bilious emesis. Still hoping to avoid NGT replacement. Feels abdomen is softer and less bloated today. Continues to have stoma output. HENNY drain output increased, still serous, but seems like less around drain. Labs/CT yesterday with no leukocytosis\, no signs of infection.                Medications:   I have reviewed this patient's current medications               Physical Exam:   Blood pressure (!) 175/70, pulse 66, temperature 98.6  F (37  C), temperature source Axillary, resp. rate 18, height 1.6 m (5' 3\"), weight 93.3 kg (205 lb 11 oz), SpO2 97 %.      Intake/Output Summary (Last 24 hours) at 5/8/2022 1153  Last data filed at 5/8/2022 0630  Gross per 24 hour   Intake --   Output 3505 ml   Net -3505 ml         GEN:  alert  ABD:  Soft with bruising, stoma output brown liquid, HENNY serous         Data:        Recent Labs   Lab 05/07/22  1243 05/04/22  0715 05/03/22  1113 05/02/22  0728   WBC 9.9  --  7.4 7.8   HGB 13.0  --  14.8 13.6   HCT 39.4  --  44.4 41.1   *  --  104* 103*     332 333 389 296     Recent Labs   Lab 05/08/22  1109 05/08/22  0909 05/08/22  0628 05/07/22  1608 05/07/22  1243 05/06/22  0819 05/06/22  0807   NA  --   --  140  --  139  --  141   POTASSIUM  --   --  3.1*  --  3.9  --  3.4   CHLORIDE  --   --  113*  --  115*  --  115*   CO2  --   --  19*  --  16*  --  19*   ANIONGAP  --   --  8  --  8  --  7   * 122* 126*   < > 125*   < > 124*   BUN  --   --  15  --  16  --  21   CR  --   --  0.64  --  0.65  --  0.99   GFRESTIMATED  --   --  >90  --  >90  --  60*   GISELLE  --   --  9.0  --  8.6  --  8.0*    < > = values in this interval not displayed.              Assessment and Plan:   POD 10 robot LAR with DLI. Course complicated by ileus. NGT removed 5/6 with recurrent vomiting.  1) Continue NPO with sips and chips only.   2) continue " TPN, NPO, glucose control. Will hold off on NGT currently. If persistent emesis, would have to consider replacing it.  3) encourage ambulation  4) continue lovenox, will need to restart Eliquis at some point once ileus resolves.   5) stoma care and teaching.  6) strict I and O with documentation  7) Trial reglan 5mg IV q8h prn nausea/vomiting. Will see if this helps symptoms  8) Needs to decrease narcotic use. I will space out availability. Encourage patient to space out as able to improve motility.     Discussed with Dr. Taveras.    Shannan Heredia MD   Colon & Rectal Surgery Fellow  [facility]  Office Phone # [phone]

## 2022-05-08 NOTE — PROGRESS NOTES
Date/Time: 2022 6932-7821  Summary: POD7 bowel resection. Hx rectal cancer  Cognitive Concerns/Orientation: A&Ox4. Forgetful at times  Behavior and Aggression Color: green  ABNL VS/O2: VSS on RA ex HTN  Mobility: Ax1 GB  Pain Management: abdominal pain managed with prn dilaudid. N/V managed with zofran and compazine. Pt says essential oils have helped as well. Pt has thrown up a few times this shift.   Diet: NPO ex ice chips.   Bowel/Bladder: ileostomy. Gottlieb.  ABNL Labs/BG: K+ 3.9, Ma.8, Phos: 2.4. B, 122  Drain/Devices: Right double lumen PICC. Running continuous TPN at 75mL/hr and lipids at 20.8mL/hr. no PIV. Using purple port for IV meds. HENNY drain, leaks at site. dressing changed.    Telemetry Rhythm: NA  Skin: scattered bruising. Bruises to abdomen. Lap sites on abdomen FUAD.   Tests/Procedures: Pt had CT today- pending the need to insert another NG tube.    Discharge Date: pending CRS clearance   Other Info:

## 2022-05-08 NOTE — PLAN OF CARE
Pt is A&O x4. VSS on RA. PICC without blood return. Pt reports intermittent nausea. Sea bands placed with some relief. Abdomen distended. Lap sites FUAD. HENNY leaking and dressing changed x1 this shift. Ostomy red and protruding- passing gas and some brown liquid output. Dilaudid given x1. Removed hooker. Due to void. Continue to monitor.

## 2022-05-08 NOTE — PHARMACY
TPN formula evaluated based on today s labs results.    The following changes have been made:  Potassium: increased to 54 mEq/day (standard dose) .  Calcium:  decreased  to 8 mEq/day  .    Pharmacy will continue to follow and adjust as appropriate.

## 2022-05-09 LAB
ALBUMIN SERPL-MCNC: 2.1 G/DL (ref 3.4–5)
ALP SERPL-CCNC: 80 U/L (ref 40–150)
ALT SERPL W P-5'-P-CCNC: 39 U/L (ref 0–50)
ANION GAP SERPL CALCULATED.3IONS-SCNC: 8 MMOL/L (ref 3–14)
AST SERPL W P-5'-P-CCNC: 39 U/L (ref 0–45)
BILIRUB SERPL-MCNC: 0.8 MG/DL (ref 0.2–1.3)
BUN SERPL-MCNC: 17 MG/DL (ref 7–30)
CALCIUM SERPL-MCNC: 9.1 MG/DL (ref 8.5–10.1)
CHLORIDE BLD-SCNC: 114 MMOL/L (ref 94–109)
CHLORIDE UR-SCNC: 75 MMOL/L
CO2 SERPL-SCNC: 16 MMOL/L (ref 20–32)
CREAT SERPL-MCNC: 0.58 MG/DL (ref 0.52–1.04)
CREAT UR-MCNC: 99 MG/DL
GFR SERPL CREATININE-BSD FRML MDRD: >90 ML/MIN/1.73M2
GLUCOSE BLD-MCNC: 121 MG/DL (ref 70–99)
GLUCOSE BLDC GLUCOMTR-MCNC: 123 MG/DL (ref 70–99)
GLUCOSE BLDC GLUCOMTR-MCNC: 128 MG/DL (ref 70–99)
GLUCOSE BLDC GLUCOMTR-MCNC: 129 MG/DL (ref 70–99)
GLUCOSE BLDC GLUCOMTR-MCNC: 132 MG/DL (ref 70–99)
GLUCOSE BLDC GLUCOMTR-MCNC: 156 MG/DL (ref 70–99)
INR PPP: 0.99 (ref 0.85–1.15)
LACTATE SERPL-SCNC: 0.9 MMOL/L (ref 0.7–2)
MAGNESIUM SERPL-MCNC: 1.9 MG/DL (ref 1.6–2.3)
OSMOLALITY UR: 600 MMOL/KG (ref 100–1200)
PHOSPHATE SERPL-MCNC: 2.8 MG/DL (ref 2.5–4.5)
POTASSIUM BLD-SCNC: 3.9 MMOL/L (ref 3.4–5.3)
POTASSIUM UR-SCNC: 36 MMOL/L
PROT SERPL-MCNC: 5.6 G/DL (ref 6.8–8.8)
SODIUM SERPL-SCNC: 138 MMOL/L (ref 133–144)
SODIUM UR-SCNC: 8 MMOL/L
TRIGL SERPL-MCNC: 129 MG/DL
UUN UR-MCNC: 1130 MG/DL
UUN/CREAT 24H UR: 11 G/G CR

## 2022-05-09 PROCEDURE — 83605 ASSAY OF LACTIC ACID: CPT | Performed by: INTERNAL MEDICINE

## 2022-05-09 PROCEDURE — 120N000001 HC R&B MED SURG/OB

## 2022-05-09 PROCEDURE — 36415 COLL VENOUS BLD VENIPUNCTURE: CPT | Performed by: PHYSICIAN ASSISTANT

## 2022-05-09 PROCEDURE — 85610 PROTHROMBIN TIME: CPT | Performed by: PHYSICIAN ASSISTANT

## 2022-05-09 PROCEDURE — 84300 ASSAY OF URINE SODIUM: CPT | Performed by: INTERNAL MEDICINE

## 2022-05-09 PROCEDURE — 99221 1ST HOSP IP/OBS SF/LOW 40: CPT | Performed by: INTERNAL MEDICINE

## 2022-05-09 PROCEDURE — 84478 ASSAY OF TRIGLYCERIDES: CPT | Performed by: PHYSICIAN ASSISTANT

## 2022-05-09 PROCEDURE — 250N000011 HC RX IP 250 OP 636: Performed by: STUDENT IN AN ORGANIZED HEALTH CARE EDUCATION/TRAINING PROGRAM

## 2022-05-09 PROCEDURE — 84133 ASSAY OF URINE POTASSIUM: CPT | Performed by: INTERNAL MEDICINE

## 2022-05-09 PROCEDURE — 80053 COMPREHEN METABOLIC PANEL: CPT | Performed by: PHYSICIAN ASSISTANT

## 2022-05-09 PROCEDURE — 250N000011 HC RX IP 250 OP 636: Performed by: HOSPITALIST

## 2022-05-09 PROCEDURE — 999N000190 HC STATISTIC VAT ROUNDS

## 2022-05-09 PROCEDURE — 84540 ASSAY OF URINE/UREA-N: CPT | Performed by: INTERNAL MEDICINE

## 2022-05-09 PROCEDURE — 250N000013 HC RX MED GY IP 250 OP 250 PS 637: Performed by: COLON & RECTAL SURGERY

## 2022-05-09 PROCEDURE — 83935 ASSAY OF URINE OSMOLALITY: CPT | Performed by: INTERNAL MEDICINE

## 2022-05-09 PROCEDURE — 99232 SBSQ HOSP IP/OBS MODERATE 35: CPT | Performed by: INTERNAL MEDICINE

## 2022-05-09 PROCEDURE — 83735 ASSAY OF MAGNESIUM: CPT | Performed by: PHYSICIAN ASSISTANT

## 2022-05-09 PROCEDURE — 84100 ASSAY OF PHOSPHORUS: CPT | Performed by: PHYSICIAN ASSISTANT

## 2022-05-09 PROCEDURE — G0463 HOSPITAL OUTPT CLINIC VISIT: HCPCS

## 2022-05-09 PROCEDURE — 250N000011 HC RX IP 250 OP 636: Performed by: COLON & RECTAL SURGERY

## 2022-05-09 PROCEDURE — 250N000009 HC RX 250: Performed by: HOSPITALIST

## 2022-05-09 PROCEDURE — 250N000009 HC RX 250: Performed by: COLON & RECTAL SURGERY

## 2022-05-09 PROCEDURE — 82436 ASSAY OF URINE CHLORIDE: CPT | Performed by: INTERNAL MEDICINE

## 2022-05-09 PROCEDURE — 250N000013 HC RX MED GY IP 250 OP 250 PS 637: Performed by: NURSE PRACTITIONER

## 2022-05-09 PROCEDURE — 36415 COLL VENOUS BLD VENIPUNCTURE: CPT | Performed by: INTERNAL MEDICINE

## 2022-05-09 PROCEDURE — 250N000012 HC RX MED GY IP 250 OP 636 PS 637

## 2022-05-09 PROCEDURE — 250N000011 HC RX IP 250 OP 636: Performed by: INTERNAL MEDICINE

## 2022-05-09 RX ADMIN — HYDRALAZINE HYDROCHLORIDE 5 MG: 20 INJECTION INTRAMUSCULAR; INTRAVENOUS at 09:11

## 2022-05-09 RX ADMIN — INSULIN ASPART 1 UNITS: 100 INJECTION, SOLUTION INTRAVENOUS; SUBCUTANEOUS at 04:35

## 2022-05-09 RX ADMIN — ENOXAPARIN SODIUM 40 MG: 40 INJECTION SUBCUTANEOUS at 17:13

## 2022-05-09 RX ADMIN — LEVOTHYROXINE SODIUM 25 MCG: 25 TABLET ORAL at 06:43

## 2022-05-09 RX ADMIN — HYDROMORPHONE HYDROCHLORIDE 0.2 MG: 0.2 INJECTION, SOLUTION INTRAMUSCULAR; INTRAVENOUS; SUBCUTANEOUS at 15:52

## 2022-05-09 RX ADMIN — METOPROLOL TARTRATE 5 MG: 5 INJECTION INTRAVENOUS at 04:26

## 2022-05-09 RX ADMIN — AMIODARONE HYDROCHLORIDE 200 MG: 200 TABLET ORAL at 09:11

## 2022-05-09 RX ADMIN — PROCHLORPERAZINE EDISYLATE 5 MG: 5 INJECTION INTRAMUSCULAR; INTRAVENOUS at 23:28

## 2022-05-09 RX ADMIN — CARVEDILOL 12.5 MG: 12.5 TABLET, FILM COATED ORAL at 09:11

## 2022-05-09 RX ADMIN — UMECLIDINIUM 1 PUFF: 62.5 AEROSOL, POWDER ORAL at 10:25

## 2022-05-09 RX ADMIN — ONDANSETRON 4 MG: 2 INJECTION INTRAMUSCULAR; INTRAVENOUS at 15:53

## 2022-05-09 RX ADMIN — ESCITALOPRAM OXALATE 10 MG: 10 TABLET ORAL at 09:11

## 2022-05-09 RX ADMIN — ONDANSETRON 4 MG: 2 INJECTION INTRAMUSCULAR; INTRAVENOUS at 21:51

## 2022-05-09 RX ADMIN — METOPROLOL TARTRATE 5 MG: 5 INJECTION INTRAVENOUS at 10:25

## 2022-05-09 RX ADMIN — MAGNESIUM SULFATE HEPTAHYDRATE: 500 INJECTION, SOLUTION INTRAMUSCULAR; INTRAVENOUS at 19:59

## 2022-05-09 RX ADMIN — METOPROLOL TARTRATE 5 MG: 5 INJECTION INTRAVENOUS at 16:07

## 2022-05-09 RX ADMIN — CARVEDILOL 12.5 MG: 12.5 TABLET, FILM COATED ORAL at 17:13

## 2022-05-09 RX ADMIN — HYDRALAZINE HYDROCHLORIDE 5 MG: 20 INJECTION INTRAMUSCULAR; INTRAVENOUS at 21:35

## 2022-05-09 RX ADMIN — SIMETHICONE 80 MG: 80 TABLET, CHEWABLE ORAL at 21:33

## 2022-05-09 RX ADMIN — HYDRALAZINE HYDROCHLORIDE 5 MG: 20 INJECTION INTRAMUSCULAR; INTRAVENOUS at 04:26

## 2022-05-09 RX ADMIN — AMLODIPINE BESYLATE 5 MG: 5 TABLET ORAL at 09:11

## 2022-05-09 ASSESSMENT — ACTIVITIES OF DAILY LIVING (ADL)
ADLS_ACUITY_SCORE: 26
ADLS_ACUITY_SCORE: 26
ADLS_ACUITY_SCORE: 10
ADLS_ACUITY_SCORE: 26
ADLS_ACUITY_SCORE: 10
ADLS_ACUITY_SCORE: 26
ADLS_ACUITY_SCORE: 26
ADLS_ACUITY_SCORE: 10
ADLS_ACUITY_SCORE: 26
ADLS_ACUITY_SCORE: 10
ADLS_ACUITY_SCORE: 10
ADLS_ACUITY_SCORE: 26
ADLS_ACUITY_SCORE: 10
ADLS_ACUITY_SCORE: 26
ADLS_ACUITY_SCORE: 10

## 2022-05-09 NOTE — PROGRESS NOTES
COLON & RECTAL SURGERY  PROGRESS NOTE    May 9, 2022  Post-op Day #11 robotic LAR with DLI    SUBJECTIVE:  Feeling much better this morning. Denies nausea. Wants to try liquids again. Having loose stool and gas from stoma.     OBJECTIVE:  Temp:  [97.6  F (36.4  C)-98.6  F (37  C)] 98.6  F (37  C)  Pulse:  [58-77] 69  Resp:  [16-18] 18  BP: (125-175)/(54-73) 159/67  SpO2:  [96 %-98 %] 96 %    Intake/Output Summary (Last 24 hours) at 5/9/2022 0800  Last data filed at 5/9/2022 0635  Gross per 24 hour   Intake --   Output 2695 ml   Net -2695 ml       GENERAL:  Awake, alert, no acute distress  HEAD: Normocephalic atraumatic  SCLERA: Anicteric  EXTREMITIES: Warm and well perfused  ABDOMEN:  Soft, appropriately tender, non-distended. No guarding, rigidity, or peritoneal signs. Stoma pink and protruding with gas and liquid stool present in bag.   INCISION:  C/d/i, some ecchymosis around pfannenstiel incision    LABS:  Lab Results   Component Value Date    WBC 9.9 05/07/2022     Lab Results   Component Value Date    HGB 13.0 05/07/2022     Lab Results   Component Value Date    HCT 39.4 05/07/2022     Lab Results   Component Value Date     05/07/2022     05/07/2022     Last Basic Metabolic Panel:  Lab Results   Component Value Date     05/08/2022      Lab Results   Component Value Date    POTASSIUM 3.6 05/08/2022     Lab Results   Component Value Date    CHLORIDE 113 05/08/2022     Lab Results   Component Value Date    GISELLE 9.0 05/08/2022     Lab Results   Component Value Date    CO2 19 05/08/2022     Lab Results   Component Value Date    BUN 15 05/08/2022     Lab Results   Component Value Date    CR 0.64 05/08/2022     Lab Results   Component Value Date     05/09/2022     05/08/2022       ASSESSMENT/PLAN: POD 11 robot LAR with DLI. Course complicated by ileus. NGT removed 5/6. Now with return of bowel function.     1. Full liquid diet, continue TPN  2. PRN pain meds  3. OOB, ambulate  4.  Continue Lovenox, will restart Eliquis once tolerating adequate PO intake  5. Strict I/Os    Seen and examined with Dr. Taveras.     For questions/paging, please contact the CRS office at [phone].    Olga Rangel PA-C  Colorectal Physician Assistant    [facility]  [address]  [place] [address]  T: [phone]  F: [phone]

## 2022-05-09 NOTE — PLAN OF CARE
Goal Outcome Evaluation:      Pt AO x4. Forgetful at times but easily reoriented. VSS on RA except BP this afternoon. Afternoon dose hydralazine held. Diet moved to full liquid. Ambulating around room with walker, SBA. HENNY in place, frequently draining serosanguinous output. HENNY dressing changed x3 this shift. Ostomy red and protruding, frequently draining green output. Nausea controlled with PRN zofran, pain controlled with dilaudid. TPN running. Continue to monitor.

## 2022-05-09 NOTE — PLAN OF CARE
VSS on RA except hypertension. Pt denies pain/nausea/vomiting most of shift, however at the end of the shift pt reported feelings of nausea, essential oils given. Up with SBA in room to BR x2, voiding adequately. Ileostomy red, protruding, with good green/brown liquid output. BS hypoactive x4. HENNY WNL with serosanguinous output, dressing changed x1. Abdominal incisions approximated, FUAD. NPO ex meds & ice chips. PICC WNL, infusing TPN. BG WNL. Continue to monitor.     Plan of Care Reviewed With: patient     Overall Patient Progress: improving

## 2022-05-09 NOTE — PROGRESS NOTES
Date/Time: 2022 6780-5999  Summary: POD 10 bowel resection. Continuous N/V. Hx of rectal cancer  Cognitive Concerns/Orientation: A&Ox4  Behavior and Aggression Color: green  ABNL VS/O2: VSS on RA ex HTN  Mobility: Ax1 with walker  Pain Management: abdominal pain. No dilaudid given this shift. No complaints of N/V.    Diet: NPO ex ice chips and water with meds  Bowel/Bladder: hooker removed earlier today. Pt has voided since removal. Ileostomy.  ABNL Labs/BG: K+: 3.3. replaced at 1900. Recheck at 2300. B, 115  Drain/Devices: Right double lumen PICC infusing continuous TPN at 75mL/hr in red lumen. No blood return in purple lumen.  Telemetry Rhythm: NA  Skin: Abdominal lap sites, FUAD. HENNY drain which leaks at site but leakage has decreased.   Tests/Procedures:   Discharge Date: pending CRS clearance  Other Info:

## 2022-05-09 NOTE — PROGRESS NOTES
[facility]    Medicine Progress Note - Hospitalist Service       Date of Admission:  4/28/2022    Assessment & Plan   Janet Figueroa is a 71 year old female with complex PMHx including rectal cancer, CAD s/p PCI, paroxysmal a-fib on chronic anticoagulation with apixaban, renal artery stenosis, CKD, prediabetes, and COPD who was admitted to the Colorectal Surgery service on 4/28/2022 for low anterior resection with diverting loop ileostomy. Hospitalist service was consulted for post-op medical management. Hospital course has been complicated by prolonged post-op ileus      Rectal cancer s/p low anterior resection with diverting loop ileostomy on 4/28/2022  Post-operative ileus  * Underwent procedure under GETA by Dr. Connolly, minimal EBL noted  * Developed acute abdominal pain with nausea/vomiting on 5/2. NG tube placed.    * 5/6: NG tube removed with recurrence of nausea/vomiting  * 5/7: CT abd/pelvis showed small bowel obstruction  - No nausea/vomiting for ~24 hours now. Diet advanced to full liquids by CRS today, 5/9  - On TPN for supplemental nutrition per CRS  - Currently on enoxaparin for VTE ppx, plan to resume home apixaban per CRS once ileus resolves     Non-anion gap metabolic acidosis  - HCO3 16 today, ?increased acid production vs GI losses  - Lactate ordered  - Nephrology consult ordered    Left thumb pain and swelling, suspect sprain, Improving  Developed persistent left thumb pain and swelling during this hospitalization after trying to push herself up. Left hand XR (5/6) showed no acute fractures or dislocations. Suspect sprain - pain and swelling has improved with supportive cares  - Continue supportive cares: RICE, pain meds prn     KAMRON on CKD, stage IIIa, Resolved  History of fibromuscular dysplasia of the right renal artery  * Creatinine increased up to 2.68 from baseline ~1 in the setting of ileus with nausea/vomiting and decreased PO intake; corrected with IV fluids  and TPN  - Creatinine now stable ~0.6 range      Hypokalemia, Resolved  Due to GI losses  - Potassium was replaced per protocol     CAD, native heart, native vessels s/p PCI (RCA, 11/2019; LCx, 07/2008)  Chronic diastolic CHF  Essential hypertension  * PTA: carvedilol 12.5 mg BID, amlodipine 5 mg daily, irbesartan 300 mg daily, torsemide 20 mg BID, spironolactone 25 mg daily, atorvastatin 80 mg qhs  * Coronary angiogram (01/2020) showed no evidence for recurrent occlusive CAD  * TTE (11/2019) showed EF 55%, mild left atrial enlargement, mild MR, and trace TR  - Continues on PTA carvedilol and amlodipine  - Continue holding irbesartan and home diuretics until taking more PO  - Continue holding atorvastatin for now  - Hydralazine prn      Paroxysmal atrial fibrillation   * PTA: carvedilol 12.5 mg BID, apixaban 5 mg BID  - Continues on PTA carvedilol  - Holding apixaban in setting of ileus    Pyuria  Hematuria, Resolved  * UA ordered on 4/30 for hematuria which has resolved (suspect traumatic Gottlieb catheter placement which has since been removed); also showed large LE and 29 wbcs. Patient reported urgency, which seems to be chronic. No fever/leukocytosis noted, and urine culture returned negative. Antibiotics were deferred    COPD  * Chronic and stable on Incruse daily, albuterol prn  * Appears compensated without evidence of exacerbation     Prediabetes  Chronic and stable  - High SSI available, but blood sugars have been acceptable     Hypothyroidism  Chronic and stable on levothyroxine     Major recurrent depressive disorder with anxiety  Chronic and stable on escitalopram     Diet: parenteral nutrition - ADULT compounded formula  Full Liquid Diet    DVT Prophylaxis: Enoxaparin (Lovenox) SQ  Gottlieb Catheter: Not present  Code Status: Full Code         Disposition: Expected discharge as per CRS    The patient's care was discussed with the Patient.    Eda Andrews MD  Hospitalist Service  [facility]  [facility]  Contact information available via [facility] Paging/Directory    ______________________________________________________________________    Interval History   No events overnight. Has not had nausea/vomiting now for about 24 hours. Left hand pain, swelling, and mobility improving. Denies cp/sob, dizziness/lightheadedness    Data reviewed today: I reviewed all medications, new labs and imaging results over the last 24 hours. I personally reviewed the left hand XR image(s) showing no acute fractures or dislocations.    Physical Exam   Vital Signs: Temp: 98.6  F (37  C) Temp src: Oral BP: (!) 159/67 Pulse: 69   Resp: 18 SpO2: 96 % O2 Device: None (Room air)    Weight: 198 lbs 0 oz  Constitutional: Resting in bed, NAD  HEENT: Sclera white, MMM  Respiratory: Breathing non-labored. Lungs CTAB - no wheezes, crackles, or rhonchi  Cardiovascular: Heart RRR, no m/r/g. Minimal pedal edema  GI: +BS, abd soft. Ostomy with green liquid output. HENNY drain with serous fluid.   Skin/Integument: No rash   Musculoskeletal: Normal muscle bulk and tone  Neuro: Alert and appropriate, SANCHEZ  Psych: Calm and cooperative    Data   Recent Labs   Lab 05/09/22  0400 05/08/22  2358 05/08/22  2238 05/08/22 2010 05/08/22  1714 05/08/22  0909 05/08/22  0628 05/07/22  1608 05/07/22  1243 05/06/22  0819 05/06/22  0807 05/05/22  0740 05/05/22  0710 05/04/22  1659 05/04/22  0715 05/03/22  1113   WBC  --   --   --   --   --   --   --   --  9.9  --   --   --   --   --   --  7.4   HGB  --   --   --   --   --   --   --   --  13.0  --   --   --   --   --   --  14.8   MCV  --   --   --   --   --   --   --   --  102*  --   --   --   --   --   --  104*   PLT  --   --   --   --   --   --   --   --  332  332  --   --   --   --   --  333 389   INR  --   --   --   --   --   --   --   --   --   --   --   --  0.98  --   --   --    NA  --   --   --   --   --   --  140  --  139  --  141  --  138  --  133 133   POTASSIUM  --   --  3.6  --  3.3*  --  3.1*   --  3.9  --  3.4  --  3.5  --  4.2 4.2   CHLORIDE  --   --   --   --   --   --  113*  --  115*  --  115*  --  110*  --  102 102   CO2  --   --   --   --   --   --  19*  --  16*  --  19*  --  23  --  26 25   BUN  --   --   --   --   --   --  15  --  16  --  21  --  27  --  24 18   CR  --   --   --   --   --   --  0.64  --  0.65  --  0.99  --  1.76*  --  2.68* 2.26*   ANIONGAP  --   --   --   --   --   --  8  --  8  --  7  --  5  --  5 6   GISELLE  --   --   --   --   --   --  9.0  --  8.6  --  8.0*  --  8.3*  --  8.7 9.6   * 123*  --  115*  --    < > 126*   < > 125*   < > 124*   < > 122*   < > 116* 114*   ALBUMIN  --   --   --   --   --   --   --   --   --   --   --   --  2.4*  --   --  3.2*   PROTTOTAL  --   --   --   --   --   --   --   --   --   --   --   --  5.7*  --   --  7.1   BILITOTAL  --   --   --   --   --   --   --   --   --   --   --   --  0.5  --   --  0.8   ALKPHOS  --   --   --   --   --   --   --   --   --   --   --   --  75  --   --  95   ALT  --   --   --   --   --   --   --   --   --   --   --   --  39  --   --  57*   AST  --   --   --   --   --   --   --   --   --   --   --   --  23  --   --  30    < > = values in this interval not displayed.         No results found for this or any previous visit (from the past 24 hour(s)).    Medications     dextrose       parenteral nutrition - ADULT compounded formula 75 mL/hr at 05/09/22 0224       amiodarone  200 mg Oral Daily     amLODIPine  5 mg Oral Daily     [Held by provider] atorvastatin  80 mg Oral At Bedtime     carvedilol  12.5 mg Oral BID w/meals     enoxaparin ANTICOAGULANT  40 mg Subcutaneous Q24H     escitalopram  10 mg Oral Daily     hydrALAZINE  5 mg Intravenous Q6H     insulin aspart  1-12 Units Subcutaneous Q4H     [Held by provider] irbesartan  300 mg Oral At Bedtime     levothyroxine  25 mcg Oral QAM AC     lipids  250 mL Intravenous Once per day on Wed Sat     metoprolol  5 mg Intravenous Q6H     ondansetron  4 mg Intravenous Once      sodium chloride (PF)  10-40 mL Intracatheter Q7 Days     sodium chloride (PF)  3 mL Intracatheter Q8H     sodium chloride   Intravenous Q4H     [Held by provider] spironolactone  25 mg Oral Daily     [Held by provider] torsemide  20 mg Oral BID     umeclidinium  1 puff Inhalation Daily

## 2022-05-09 NOTE — PROGRESS NOTES
CLINICAL NUTRITION SERVICES - REASSESSMENT NOTE      Malnutrition: (5/4)  % Weight Loss:  None noted  % Intake:  </= 50% for >/= 5 days (severe malnutrition)  Subcutaneous Fat Loss:  None observed  Muscle Loss:  None observed  Fluid Retention:  None noted     Malnutrition Diagnosis: Patient does not meet two of the above criteria necessary for diagnosing malnutrition       EVALUATION OF PROGRESS TOWARD GOALS   Diet:  Full  Liquid (advanced today)    Nutrition Support:  Patient started on TPN 5/4, advanced to goal on 5/5, and continues as follows ~    Nutrition Support Parenteral:  Type of Access: PICC  Parenteral Frequency:  Continuous  Parenteral Regimen: Run at 75 mL/hr, 90 g AA/day, 270 g Dextrose/day + Lipid 250 mL 20% 2x per week   Total Parenteral Provisions: 1421 kcal (23 kcal/kg and 93% needs), 90 g protein (1.5 g/kg), 270 g CHO (GIR 3)      Intake/Tolerance:    Noted patient ordered a clear liquid dinner on 5/6 but nothing since.  NGT d/c'd on 5/6 but has had intermittent nausea all weekend.  Reglan started on 5/8, diet advanced to full liquid this am by surgery.  Noted emesis on 5/6 and 5/7, Sea Bands in place per RN notes.    K, Mg, Phos normal    BGM < 180 on High sliding scale insulin  I/O incomplete, wt= 89.8 kg - Demadex  Stool 1200 mL, drains 170 mL        ASSESSED NUTRITION NEEDS:  Dosing Weight 61.3 kg   Estimated Energy Needs: 7101-3075 kcals (25-30 Kcal/Kg)  Justification: obese  Estimated Protein Needs: 75-90 grams protein (1.2-1.5 g pro/Kg)  Justification: post-op and hypercatabolism with acute illness      NEW FINDINGS:   Patient was previously receiving D5 IVF but that was discontinued on 5/4     Previous Goals (5/4):   TPN/Lipid + D5 IVF will meet % needs while NPO   Evaluation: Met    Previous Nutrition Diagnosis (5/4):   Inadequate protein-energy intake related to NPO with plans to start TPN tonight as evidenced by meeting 0% protein, 25% energy needs from D5 IVF  Evaluation:  Resolved       CURRENT NUTRITION DIAGNOSIS  No nutrition diagnosis identified at this time    INTERVENTIONS  Recommendations / Nutrition Prescription  Continue goal TPN + Lipid as above     Implementation  Collaboration and Referral of Nutrition care:  Discussed with Pharmacist - no TPN changed today     Goals  TPN/Lipid will continue to meet % needs while intake minimal     MONITORING AND EVALUATION:  Progress towards goals will be monitored and evaluated per protocol and Practice Guidelines    Kellie Quintero RD, LD, CNSC   Clinical Dietitian - [facility]

## 2022-05-09 NOTE — CONSULTS
Nephrology Initial Consult  May 9, 2022      Janet Figueroa MRN:[id number] YOB: 1950  Date of Admission:4/28/2022  Primary care provider: Melissa Abreu MD  Requesting physician: Lay Connolly MD    ASSESSMENT AND RECOMMENDATIONS:   1 Non anion gap metabolic acidosis -   Likely d/t GI loss through ostomy . Concurrent hypokalemia   Urine pH of 5.5 suggests appropriate renal response with acidification.      Recc -   Urine NA, K ,Cl, urea, Osmolality   D/w pharmacy re: adjusting TPN . Advised that TPN is already at recommended max Acetate and minimum chloride   Hold off on oral bicarb given she is just coming off NPO and will likely not tolerate it   Avoid IV saline   Check VBG with AM labs     2 Rectal cancer s/p low anterior resection with diverting loop ileostomy on 4/28/2022    3 CKD 3a with recent KAMRON -   Baseline Cr appears to be around 1.2 ( fluctuates quite a bit)   Cr much better than baseline now , at 0.5    Thank you for the consult. Will continue to follow along with you .      Shawanda Armstrong MD  [facility] - Nephrology   [phone]        REASON FOR CONSULT: metabolic acidosis     HISTORY OF PRESENT ILLNESS:  Janet Figueroa is a 71 year old female with hx of rectal cancer, CAD, Par A.fib, FMD/ BALTAZAR , COPD who underwent low anterior resection with diverting loop ileostomy on 4/28 - > complicated by small bowel obstruction . She is on TPN and now starting back on liquid diet today .     Baseline Cr appears to be around 1.2 ( fluctuates quite a bit) . Sustained KAMRON post surgery with Cr upto 2.6, which quickley improved to 0.5 today with conservative measures , which is much lower than her baseline  We have been consulted 2/2 new development of NAGMA    05/04/22 07:15 05/05/22 07:10 05/06/22 08:07 05/07/22 12:43 05/08/22 06:28 05/09/22 08:33   Chloride 102 110 (H) 115 (H) 115 (H) 113 (H) 114 (H)   Carbon Dioxide 26 23 19 (L) 16 (L) 19 (L) 16 (L)   Anion Gap  5 5 7 8 8 8     Her urine PH is 5.5. Alb 2.1. K 3.1-3.5  On TPN with 115 meq and Chloride 90 meq @ 75 ml/hr   Moderately large ostomy output . 1.2 L yesterday . Already at 1 L today   On exam, she appears comfortable. Denies any complaints. Breathing okay , RR 18      PAST MEDICAL HISTORY:  Reviewed with patient on 05/09/2022  and is as listed in HPI.       MEDICATIONS:  PTA Meds  Prior to Admission medications    Medication Sig Last Dose Taking? Auth Provider   acetaminophen (TYLENOL) 500 MG tablet Take 500-1,000 mg by mouth every 6 hours as needed for mild pain 4/27/2022 at 1200 Yes Reported, Patient   albuterol (PROAIR HFA/PROVENTIL HFA/VENTOLIN HFA) 108 (90 Base) MCG/ACT inhaler Inhale 2 puffs into the lungs 4 times daily as needed More than a month at prn Yes Reported, Patient   amiodarone (PACERONE) 200 MG tablet Take 1 tablet by mouth daily 4/28/2022 at am Yes Reported, Patient   amLODIPine (NORVASC) 5 MG tablet Take 1 tablet by mouth daily 4/28/2022 at am Yes Reported, Patient   apixaban ANTICOAGULANT (ELIQUIS) 5 MG tablet Take 5 mg by mouth 2 times daily 4/22/2022 at pm Yes Reported, Patient   atorvastatin (LIPITOR) 80 MG tablet Take 80 mg by mouth At Bedtime 4/27/2022 at pm Yes Reported, Patient   carvedilol (COREG) 12.5 MG tablet Take 12.5 mg by mouth 2 times daily (with meals) 4/28/2022 at am Yes Reported, Patient   escitalopram (LEXAPRO) 10 MG tablet Take 1 tablet by mouth daily 4/27/2022 at am Yes Reported, Patient   irbesartan (AVAPRO) 300 MG tablet Take 1 tablet by mouth daily 4/26/2022 at am Yes Reported, Patient   levothyroxine (SYNTHROID/LEVOTHROID) 25 MCG tablet Take 1 tablet by mouth daily 4/28/2022 at am Yes Reported, Patient   spironolactone (ALDACTONE) 50 MG tablet Take 25 mg by mouth daily (0.5 x 50 mg = 25 mg) 4/26/2022 at am Yes Reported, Patient   torsemide (DEMADEX) 20 MG tablet Take 20 mg by mouth 2 times daily 4/26/2022 at am Yes Reported, Patient   umeclidinium (INCRUSE ELLIPTA) 62.5  MCG/INH inhaler Inhale 1 puff into the lungs daily 4/28/2022 at am Yes Reported, Patient   metroNIDAZOLE (FLAGYL) 500 MG tablet Take 500 mg by mouth See Admin Instructions Three times day before surgery; at 1pm, 2pm, and 11pm 4/27/2022 at 2300  Reported, Patient   neomycin (MYCIFRADIN) 500 MG tablet Take 500 mg by mouth See Admin Instructions Three times day before surgery; at 1pm, 2pm, and 11pm 4/27/2022 at 2300  Reported, Patient      Current Meds    amiodarone  200 mg Oral Daily     amLODIPine  5 mg Oral Daily     [Held by provider] atorvastatin  80 mg Oral At Bedtime     carvedilol  12.5 mg Oral BID w/meals     enoxaparin ANTICOAGULANT  40 mg Subcutaneous Q24H     escitalopram  10 mg Oral Daily     hydrALAZINE  5 mg Intravenous Q6H     insulin aspart  1-12 Units Subcutaneous Q4H     [Held by provider] irbesartan  300 mg Oral At Bedtime     levothyroxine  25 mcg Oral QAM AC     lipids  250 mL Intravenous Once per day on Wed Sat     metoprolol  5 mg Intravenous Q6H     ondansetron  4 mg Intravenous Once     sodium chloride (PF)  10-40 mL Intracatheter Q7 Days     sodium chloride (PF)  3 mL Intracatheter Q8H     sodium chloride   Intravenous Q4H     [Held by provider] spironolactone  25 mg Oral Daily     [Held by provider] torsemide  20 mg Oral BID     umeclidinium  1 puff Inhalation Daily     Infusion Meds    dextrose       parenteral nutrition - ADULT compounded formula       parenteral nutrition - ADULT compounded formula 75 mL/hr at 05/09/22 0224       ALLERGIES:    Allergies   Allergen Reactions     Ciprofloxacin Hives     Alendronic Acid Other (See Comments)     HUT Comment: severe esophageal reflux, jaw pain, arm pain  severe esophageal reflux, jaw pain, arm pain       Clopidogrel Other (See Comments)     Increased bruising and bloody nose  Increased bruising and bloody nose       Codeine Other (See Comments) and Itching     Digoxin Nausea and Vomiting     Lisinopril Cough and Other (See Comments)     HUT  "Reaction: Cough       Risedronate      HUT Comment: severe esophageal reflux, jaw and arm pain  severe esophageal reflux, jaw and arm pain       Adhesive Tape Rash     And telemetry adhesive     Cephalexin Rash     Chromium Rash       REVIEW OF SYSTEMS:  A comprehensive of systems was negative except as noted above.    SOCIAL HISTORY:   Reviewed with patient on 2022     FAMILY MEDICAL HISTORY:   Family History   Problem Relation Age of Onset     Coronary Artery Disease Mother      Lung Cancer Mother      Coronary Artery Disease Father      Kidney failure Father      Hypertension Sister      Liver Cancer Sister      Hypertension Brother      Reviewed with patient on 2022     PHYSICAL EXAM:   Temp  Av.4  F (36.9  C)  Min: 97  F (36.1  C)  Max: 100.6  F (38.1  C)      Pulse  Av.8  Min: 57  Max: 91 Resp  Av.6  Min: 10  Max: 28  SpO2  Av.1 %  Min: 92 %  Max: 98 %       BP (!) 80/61 (BP Location: Left arm)   Pulse 59   Temp 98.4  F (36.9  C) (Oral)   Resp 18   Ht 1.6 m (5' 3\")   Wt 89.8 kg (198 lb)   SpO2 98%   BMI 35.07 kg/m     Date 22 0700 - 05/10/22 0659   Shift 3793-2350 6146-3578 2852-4433 24 Hour Total   INTAKE   P.O. 600   600   Shift Total(mL/kg) 600(6.68)   600(6.68)   OUTPUT   Urine 500   500   Drains 110   110   Stool 975   975   Shift Total(mL/kg) 1585(17.65)   1585(17.65)   Weight (kg) 89.81 89.81 89.81 89.81      Admit Weight: 89.4 kg (197 lb)     GENERAL APPEARANCE: no distress,  awake  EYES: no scleral icterus, pupils equal  HENT: NC/AT,  mouth  without ulcers or lesions  Lymphatics: no cervical or supraclavicular LAD  Endo: no moon facies, no goiter  Pulmonary: lungs clear to auscultation with equal breath sounds bilaterally, no clubbing  CV: regular rhythm, normal rate, no rub   - JVP -  GI: soft, ostomy in place   MS: no evidence of inflammation in joints  : no hooker  SKIN: no rash, warm, dry, no cyanosis  NEURO: face symmetric, no asterixis     LABS: "   [facility]  Recent Labs   Lab 05/09/22  1230 05/09/22  0833 05/09/22  0400 05/08/22  2358 05/08/22  2238 05/08/22 2010 05/08/22  1714 05/08/22  1109 05/08/22  1046 05/08/22  0909 05/08/22  0628 05/07/22  1608 05/07/22  1243 05/06/22  0819 05/06/22  0807 05/05/22  0740 05/05/22  0710 05/04/22  0715 05/03/22  1113   NA  --  138  --   --   --   --   --   --   --   --  140  --  139  --  141  --  138   < > 133   POTASSIUM  --  3.9  --   --  3.6  --  3.3*  --   --   --  3.1*  --  3.9  --  3.4  --  3.5   < > 4.2   CHLORIDE  --  114*  --   --   --   --   --   --   --   --  113*  --  115*  --  115*  --  110*   < > 102   CO2  --  16*  --   --   --   --   --   --   --   --  19*  --  16*  --  19*  --  23   < > 25   ANIONGAP  --  8  --   --   --   --   --   --   --   --  8  --  8  --  7  --  5   < > 6   * 121* 156* 123*  --    < >  --    < >  --    < > 126*   < > 125*   < > 124*   < > 122*   < > 114*   BUN  --  17  --   --   --   --   --   --   --   --  15  --  16  --  21  --  27   < > 18   CR  --  0.58  --   --   --   --   --   --   --   --  0.64  --  0.65  --  0.99  --  1.76*   < > 2.26*   GFRESTIMATED  --  >90  --   --   --   --   --   --   --   --  >90  --  >90  --  60*  --  30*   < > 22*   GISELLE  --  9.1  --   --   --   --   --   --   --   --  9.0  --  8.6  --  8.0*  --  8.3*   < > 9.6   MAG  --  1.9  --   --   --   --   --   --  1.9  --  1.8  --  1.8  --  1.7  --  2.1   < >  --    PHOS  --  2.8  --   --   --   --   --   --  2.7  --  2.7  --  2.4*  --  2.4*  --  2.5   < >  --    PROTTOTAL  --  5.6*  --   --   --   --   --   --   --   --   --   --   --   --   --   --  5.7*  --  7.1   ALBUMIN  --  2.1*  --   --   --   --   --   --   --   --   --   --   --   --   --   --  2.4*  --  3.2*   BILITOTAL  --  0.8  --   --   --   --   --   --   --   --   --   --   --   --   --   --  0.5  --  0.8   ALKPHOS  --  80  --   --   --   --   --   --   --   --   --   --   --   --   --   --  75  --  95   AST  --  39  --   --   --   --   --    --   --   --   --   --   --   --   --   --  23  --  30   ALT  --  39  --   --   --   --   --   --   --   --   --   --   --   --   --   --  39  --  57*    < > = values in this interval not displayed.     CBC  Recent Labs   Lab 05/07/22  1243 05/04/22  0715 05/03/22  1113   HGB 13.0  --  14.8   WBC 9.9  --  7.4   RBC 3.87  --  4.28   HCT 39.4  --  44.4   *  --  104*   MCH 33.6*  --  34.6*   MCHC 33.0  --  33.3   RDW 12.9  --  13.4     332 333 389     INR  Recent Labs   Lab 05/09/22  0833 05/05/22  0710   INR 0.99 0.98     ABGNo lab results found in last 7 days.   URINE STUDIES  Recent Labs   Lab Test 04/30/22  0402   COLOR Light Yellow   APPEARANCE Clear   URINEGLC Negative   URINEBILI Negative   URINEKETONE Negative   SG 1.006   UBLD Large*   URINEPH 5.5   PROTEIN Negative   NITRITE Negative   LEUKEST Large*   RBCU 5*   WBCU 29*       IMAGING:  Personally reviewed the images and findings are as listed in HPI     Shawanda Armstrong MD

## 2022-05-09 NOTE — PROGRESS NOTES
"[facility] Nurse Inpatient Ostomy Assessment     Assessment of new loop Ileostomy     Surgery date:  4/28/22  Surgeon:  Dr. Lay Connolly  Procedure:  Low anterior resection with diverting loop ileostomy  Related diagnosis:  Low rectal cancer    WO Assessment & Physical Exam:        Current status: Pt up in chair. Forgetful, writes many notes.       Date of last photo: 5/9/22        Stoma location: RUQ    Stoma size: 1 1/8\"     Stoma appearance: pink-red, round and edematous,    Mucocutaneous junction:  Intact      Peristomal complication(s): none,    Abdominal assessment: distended    Surgical site(s): open to air    NG still in place? No    Output: brown, green and liquid     Pain: Cramping    Is patient still on a PCA? 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 Pt emptying pouch independently. Pt practiced emptying pouch at bedside with WOC. 5/9. Pt performed most of pouch change independently with cuing. Reviewed diet and hydration, and how to order supplies      Psychosocial: Patient is pleasant and interactive asking appropriate quesitons      WO Plan:         Pouching product plan: [place] 2 piece 57mm flat with ring and high output pouch    Comments: monitor for abdominal changes that may require changes to pouching    Frequency of pouch changes: 2-3x weekly      Pt support system on discharge: spouse, however pt feels she should be able to manage.     WO recommend home care?  yes      WO follow-up plan: daily M-F      Objective Data:       Patient history according to medical record: Janet Figueroa is a 71 year old female admitted on 4/28/2022. She has a past medical history of hypertension, " mild COPD, CAD s/p PCI, dyslipidemia, paroxysmal A. Fib on eliquis, CKD, stage IIIb, renal artery stenosis, hypothyroidism, depression/anxiety, prediabetes, GERD, renal artery stenosis, who is status post low anterior resection with diverting loop ileostomy due to rectal cancer.      Current Diet/Nutrition:   Orders Placed This Encounter      Full Liquid Diet       Output:  I/O last 3 completed shifts:  In: -   Out: 2695 [Urine:1325; Drains:170; Stool:1200]      Labs:   Recent Labs   Lab 05/09/22  0833 05/07/22  1243   ALBUMIN 2.1*  --    HGB  --  13.0   INR 0.99  --    WBC  --  9.9         Star Risk Assessment:   Sensory Perception: 4-->no impairment  Moisture: 3-->occasionally moist  Activity: 3-->walks occasionally  Mobility: 3-->slightly limited  Nutrition: 3-->adequate  Friction and Shear: 2-->potential problem  Star Score: 18      WOC Interventions:       Patient's chart evaluated    Focus of today's visit: pouch change return demonstration, verbal instruction , diet and hydration , pouch emptying, output measurement, odor/flatus management, lifestyle adjustments and discharge instructions     Pouch changed 5/9    Participant(s) in teaching session today: patient  and nurse    Change made with ostomy management today: No    Supplies: at bedside    Educational materials: geena folder at bedside.    Preparation for discharge: AVS completed    Registered for supply samples? [employer] [employer]    Discussed plan of care with: Patient and Nurse       Jeane Roach RN, CWOCN

## 2022-05-10 ENCOUNTER — APPOINTMENT (OUTPATIENT)
Dept: GENERAL RADIOLOGY | Facility: CLINIC | Age: 72
DRG: 330 | End: 2022-05-10
Attending: HOSPITALIST
Payer: MEDICARE

## 2022-05-10 LAB
ALBUMIN UR-MCNC: NEGATIVE MG/DL
ANION GAP SERPL CALCULATED.3IONS-SCNC: 6 MMOL/L (ref 3–14)
APPEARANCE UR: CLEAR
BASE EXCESS BLDV CALC-SCNC: -3.1 MMOL/L (ref -7.7–1.9)
BILIRUB UR QL STRIP: NEGATIVE
BUN SERPL-MCNC: 21 MG/DL (ref 7–30)
CALCIUM SERPL-MCNC: 9.1 MG/DL (ref 8.5–10.1)
CHLORIDE BLD-SCNC: 111 MMOL/L (ref 94–109)
CO2 SERPL-SCNC: 20 MMOL/L (ref 20–32)
COLOR UR AUTO: YELLOW
CREAT SERPL-MCNC: 0.74 MG/DL (ref 0.52–1.04)
FOLATE SERPL-MCNC: 7.2 NG/ML
GFR SERPL CREATININE-BSD FRML MDRD: 85 ML/MIN/1.73M2
GLUCOSE BLD-MCNC: 142 MG/DL (ref 70–99)
GLUCOSE BLDC GLUCOMTR-MCNC: 128 MG/DL (ref 70–99)
GLUCOSE BLDC GLUCOMTR-MCNC: 130 MG/DL (ref 70–99)
GLUCOSE BLDC GLUCOMTR-MCNC: 132 MG/DL (ref 70–99)
GLUCOSE BLDC GLUCOMTR-MCNC: 133 MG/DL (ref 70–99)
GLUCOSE BLDC GLUCOMTR-MCNC: 138 MG/DL (ref 70–99)
GLUCOSE BLDC GLUCOMTR-MCNC: 140 MG/DL (ref 70–99)
GLUCOSE UR STRIP-MCNC: NEGATIVE MG/DL
HCO3 BLDV-SCNC: 21 MMOL/L (ref 21–28)
HGB UR QL STRIP: NEGATIVE
KETONES UR STRIP-MCNC: NEGATIVE MG/DL
LEUKOCYTE ESTERASE UR QL STRIP: NEGATIVE
MAGNESIUM SERPL-MCNC: 1.9 MG/DL (ref 1.6–2.3)
NITRATE UR QL: NEGATIVE
O2/TOTAL GAS SETTING VFR VENT: 21 %
PCO2 BLDV: 34 MM HG (ref 40–50)
PH BLDV: 7.4 [PH] (ref 7.32–7.43)
PH UR STRIP: 5.5 [PH] (ref 5–7)
PHOSPHATE SERPL-MCNC: 3.5 MG/DL (ref 2.5–4.5)
PLATELET # BLD AUTO: 391 10E3/UL (ref 150–450)
PO2 BLDV: 75 MM HG (ref 25–47)
POTASSIUM BLD-SCNC: 4 MMOL/L (ref 3.4–5.3)
SODIUM SERPL-SCNC: 137 MMOL/L (ref 133–144)
SP GR UR STRIP: 1.02 (ref 1–1.03)
UROBILINOGEN UR STRIP-MCNC: NORMAL MG/DL
VIT B12 SERPL-MCNC: 423 PG/ML (ref 193–986)

## 2022-05-10 PROCEDURE — 85049 AUTOMATED PLATELET COUNT: CPT | Performed by: COLON & RECTAL SURGERY

## 2022-05-10 PROCEDURE — 250N000011 HC RX IP 250 OP 636: Performed by: HOSPITALIST

## 2022-05-10 PROCEDURE — 99233 SBSQ HOSP IP/OBS HIGH 50: CPT | Performed by: HOSPITALIST

## 2022-05-10 PROCEDURE — 81003 URINALYSIS AUTO W/O SCOPE: CPT | Performed by: COLON & RECTAL SURGERY

## 2022-05-10 PROCEDURE — 80048 BASIC METABOLIC PNL TOTAL CA: CPT | Performed by: INTERNAL MEDICINE

## 2022-05-10 PROCEDURE — 999N000065 XR ABDOMEN PORT 1 VIEWS

## 2022-05-10 PROCEDURE — 258N000001 HC RX 258: Performed by: INTERNAL MEDICINE

## 2022-05-10 PROCEDURE — 36415 COLL VENOUS BLD VENIPUNCTURE: CPT | Performed by: INTERNAL MEDICINE

## 2022-05-10 PROCEDURE — 82803 BLOOD GASES ANY COMBINATION: CPT | Performed by: INTERNAL MEDICINE

## 2022-05-10 PROCEDURE — 84100 ASSAY OF PHOSPHORUS: CPT | Performed by: INTERNAL MEDICINE

## 2022-05-10 PROCEDURE — 120N000001 HC R&B MED SURG/OB

## 2022-05-10 PROCEDURE — 999N000198 HC STATISTIC WOC PT EDUCATION, 16-30 MIN

## 2022-05-10 PROCEDURE — 250N000009 HC RX 250: Performed by: INTERNAL MEDICINE

## 2022-05-10 PROCEDURE — 250N000011 HC RX IP 250 OP 636: Performed by: COLON & RECTAL SURGERY

## 2022-05-10 PROCEDURE — 250N000013 HC RX MED GY IP 250 OP 250 PS 637: Performed by: NURSE PRACTITIONER

## 2022-05-10 PROCEDURE — 999N000190 HC STATISTIC VAT ROUNDS

## 2022-05-10 PROCEDURE — 258N000003 HC RX IP 258 OP 636: Performed by: COLON & RECTAL SURGERY

## 2022-05-10 PROCEDURE — 83735 ASSAY OF MAGNESIUM: CPT | Performed by: INTERNAL MEDICINE

## 2022-05-10 PROCEDURE — 82746 ASSAY OF FOLIC ACID SERUM: CPT | Performed by: HOSPITALIST

## 2022-05-10 PROCEDURE — 250N000009 HC RX 250: Performed by: HOSPITALIST

## 2022-05-10 PROCEDURE — 250N000011 HC RX IP 250 OP 636: Performed by: STUDENT IN AN ORGANIZED HEALTH CARE EDUCATION/TRAINING PROGRAM

## 2022-05-10 PROCEDURE — 250N000013 HC RX MED GY IP 250 OP 250 PS 637: Performed by: COLON & RECTAL SURGERY

## 2022-05-10 PROCEDURE — 250N000009 HC RX 250

## 2022-05-10 PROCEDURE — 82607 VITAMIN B-12: CPT | Performed by: HOSPITALIST

## 2022-05-10 RX ADMIN — UMECLIDINIUM 1 PUFF: 62.5 AEROSOL, POWDER ORAL at 12:04

## 2022-05-10 RX ADMIN — SODIUM CHLORIDE, PRESERVATIVE FREE: 5 INJECTION INTRAVENOUS at 11:47

## 2022-05-10 RX ADMIN — MAGNESIUM SULFATE HEPTAHYDRATE: 500 INJECTION, SOLUTION INTRAMUSCULAR; INTRAVENOUS at 19:50

## 2022-05-10 RX ADMIN — ESCITALOPRAM OXALATE 10 MG: 10 TABLET ORAL at 09:13

## 2022-05-10 RX ADMIN — AMIODARONE HYDROCHLORIDE 200 MG: 200 TABLET ORAL at 09:13

## 2022-05-10 RX ADMIN — ENOXAPARIN SODIUM 40 MG: 40 INJECTION SUBCUTANEOUS at 16:14

## 2022-05-10 RX ADMIN — HYDRALAZINE HYDROCHLORIDE 5 MG: 20 INJECTION INTRAMUSCULAR; INTRAVENOUS at 16:14

## 2022-05-10 RX ADMIN — HYDROMORPHONE HYDROCHLORIDE 0.2 MG: 0.2 INJECTION, SOLUTION INTRAMUSCULAR; INTRAVENOUS; SUBCUTANEOUS at 04:48

## 2022-05-10 RX ADMIN — HYDRALAZINE HYDROCHLORIDE 5 MG: 20 INJECTION INTRAMUSCULAR; INTRAVENOUS at 10:16

## 2022-05-10 RX ADMIN — METOPROLOL TARTRATE 5 MG: 5 INJECTION INTRAVENOUS at 16:14

## 2022-05-10 RX ADMIN — AMLODIPINE BESYLATE 5 MG: 5 TABLET ORAL at 09:13

## 2022-05-10 RX ADMIN — SODIUM BICARBONATE: 84 INJECTION, SOLUTION INTRAVENOUS at 14:17

## 2022-05-10 RX ADMIN — LEVOTHYROXINE SODIUM 25 MCG: 25 TABLET ORAL at 09:14

## 2022-05-10 RX ADMIN — HYDRALAZINE HYDROCHLORIDE 5 MG: 20 INJECTION INTRAMUSCULAR; INTRAVENOUS at 22:47

## 2022-05-10 ASSESSMENT — ACTIVITIES OF DAILY LIVING (ADL)
ADLS_ACUITY_SCORE: 26
ADLS_ACUITY_SCORE: 26
ADLS_ACUITY_SCORE: 27
ADLS_ACUITY_SCORE: 26
ADLS_ACUITY_SCORE: 27
ADLS_ACUITY_SCORE: 24
ADLS_ACUITY_SCORE: 27
ADLS_ACUITY_SCORE: 27
ADLS_ACUITY_SCORE: 26
ADLS_ACUITY_SCORE: 27
ADLS_ACUITY_SCORE: 27
ADLS_ACUITY_SCORE: 26
ADLS_ACUITY_SCORE: 27
ADLS_ACUITY_SCORE: 26
ADLS_ACUITY_SCORE: 27
ADLS_ACUITY_SCORE: 26
ADLS_ACUITY_SCORE: 26

## 2022-05-10 NOTE — PLAN OF CARE
A&Ox4. VSS. Pain controlled w/ IV dilaudid x1. Pt c/o nausea/vomiting overnight. Pt had large emesis x3, NG ordered per MD, placed this AM, 1000 ml out. BS hypoactive, distended, ileostomy w/ large yellow/tan output (same color as NG contents). Voiding in BR. Lap sites FUAD. HENNY present w/ leakage, dressing changed. Up w/ SBA. Pt now NPO. R PICC w/ TPN infusing.

## 2022-05-10 NOTE — PROGRESS NOTES
COLON & RECTAL SURGERY  PROGRESS NOTE    May 10, 2022  Post-op Day #12 robotic LAR with DLI    SUBJECTIVE: Intractable nausea and vomiting overnight, NGT replaced with 1100mL out. Feels better now that NGT is in place. Good UOP, 350ml so far this am. Cr 0.76. Stoma with 2800mL/24hrs, now clear/serous output. HENNY with serous output.     OBJECTIVE:  Temp:  [98  F (36.7  C)-98.5  F (36.9  C)] 98  F (36.7  C)  Pulse:  [59-83] 65  Resp:  [16-18] 16  BP: ()/(50-61) 150/50  SpO2:  [92 %-98 %] 92 %    Intake/Output Summary (Last 24 hours) at 5/10/2022 0843  Last data filed at 5/10/2022 0835  Gross per 24 hour   Intake 1200 ml   Output 5340 ml   Net -4140 ml       GENERAL:  Awake, alert, no acute distress  HEAD: Normocephalic atraumatic  SCLERA: Anicteric  EXTREMITIES: Warm and well perfused  ABDOMEN:  Soft, appropriately tender, non-distended. No guarding, rigidity, or peritoneal signs. Stoma pink and protruding with gas and serous fluid output present in bag. HENNY drain with serous output.   INCISION:  C/d/i    LABS:  Lab Results   Component Value Date    WBC 9.9 05/07/2022     Lab Results   Component Value Date    HGB 13.0 05/07/2022     Lab Results   Component Value Date    HCT 39.4 05/07/2022     Lab Results   Component Value Date     05/10/2022     Last Basic Metabolic Panel:  Lab Results   Component Value Date     05/10/2022      Lab Results   Component Value Date    POTASSIUM 4.0 05/10/2022     Lab Results   Component Value Date    CHLORIDE 111 05/10/2022     Lab Results   Component Value Date    GISELLE 9.1 05/10/2022     Lab Results   Component Value Date    CO2 20 05/10/2022     Lab Results   Component Value Date    BUN 21 05/10/2022     Lab Results   Component Value Date    CR 0.74 05/10/2022     Lab Results   Component Value Date     05/10/2022     05/10/2022       ASSESSMENT/PLAN: POD#12 robot LAR with DLI. Course complicated by ileus. NGT removed 5/6. Now with return of bowel  function.     1. NPO, continue TPN  2. Continue NGT, okay for ice chips, advised patient to limit ice chips  3. IV PRN pain meds  4. Close monitoring of I/Os  5. OOB, ambulate  6. Continue Lovenox for ppx. Will plan to resume PTA Eliquis once ileus resolves and patient is tolerating adequate PO intake.   7. Fluid replacement with 1:0.5cc of NGT output with 0.9 NS every 4 hours    Discussed with Dr. Connolly.     For questions/paging, please contact the CRS office at [phone].    Olga Rangel PA-C  Colorectal Physician Assistant    [facility]  [address]  [place] [address]  T: [phone]  F: [phone]        Colon and Rectal Surgery Attending Note    Patient seen and examined independently.  Agree with above assessment and plan.  Feeling better with the NGT. Sitting up in the chair.  NGT with 1.2 L stoma with 2 L  abd soft, stoma pink.   HENNY serous  Plan ongoing ileus  Discontinue HENNY  Continue NGT, NPO, TPN  Fluid replacement to avoid dehydration..   Minimize narcotics.    Lay Connolly MD  [facility]  [address], [address]  [place] [address]  T: [phone]  F: [phone]   [email or web address]

## 2022-05-10 NOTE — SIGNIFICANT EVENT
Significant Event Note    Patient with intractable vomiting overnight failing to improve with zofran and compazine. Will place NGT to low intermittent suction.     Anival Bosch MD

## 2022-05-10 NOTE — PROGRESS NOTES
Pt is A&O x4. VSS on RA. PICC Pt reports intermittent nausea. Had a large emesis, zofran administered  Abdomen distended. Very poor appetite. Lap sites FUAD. HENNY leaking and dressing changed x1 this shift. HENNY tubing may be clogged?  Ostomy red and protruding- passing gas and some brown liquid output. SBA.  Continue to monitor.

## 2022-05-10 NOTE — PROVIDER NOTIFICATION
Paged NENA Espinoza to clarify fluid replacement orders, also to ask about HENNY drain, pt states the Dr Connolly said drain could come out today, Per Olga Connolly in surgery, leave drain in placed unless we see order for removal.

## 2022-05-10 NOTE — PROGRESS NOTES
[facility]    Medicine Progress Note - Hospitalist Service       Date of Admission:  4/28/2022    Assessment & Plan   Janet Figueroa is a 71 year old female with complex PMHx including rectal cancer, CAD s/p PCI, paroxysmal a-fib on chronic anticoagulation with apixaban, renal artery stenosis, CKD, prediabetes, and COPD who was admitted to the Colorectal Surgery service on 4/28/2022 for low anterior resection with diverting loop ileostomy. Hospitalist service was consulted for post-op medical management. Hospital course has been complicated by prolonged post-op ileus      Rectal cancer s/p low anterior resection with diverting loop ileostomy on 4/28/2022  Post-operative ileus  * Underwent procedure under GETA by Dr. Connolly, minimal EBL noted  * Developed acute abdominal pain with nausea/vomiting on 5/2. NG tube placed.    * 5/6: NG tube removed with recurrence of nausea/vomiting  * 5/7: CT abd/pelvis showed small bowel obstruction  - No nausea/vomiting for ~24 hours now. Diet advanced to full liquids by CRS, 5/9.  However patient had worsening symptoms and hence made n.p.o. again and NG tube replaced.  - On TPN for supplemental nutrition per CRS  - Currently on enoxaparin for VTE ppx, plan to resume home apixaban per CRS once ileus resolves     Non-anion gap metabolic acidosis, resolved  -  ?increased acid production vs GI losses.  Lactate normal.  - Nephrology consult ordered.  Recommend replace bicarb as needed however oral will be tough given GI issues.  Nephrology has started on D5 half NS with 75 meq bicarb.  Nephrology has signed off.    Left thumb pain and swelling, suspect sprain, Improving  Developed persistent left thumb pain and swelling during this hospitalization after trying to push herself up. Left hand XR (5/6) showed no acute fractures or dislocations. Suspect sprain - pain and swelling has improved with supportive cares  - Continue supportive cares: RICE, pain meds  prn     KAMRON on CKD, stage IIIa, Resolved  History of fibromuscular dysplasia of the right renal artery  * Creatinine increased up to 2.68 from baseline ~1 in the setting of ileus with nausea/vomiting and decreased PO intake; corrected with IV fluids and TPN  - Creatinine now stable ~0.6 range      Hypokalemia, Resolved  Due to GI losses  - Potassium was replaced per protocol     CAD, native heart, native vessels s/p PCI (RCA, 11/2019; LCx, 07/2008)  Chronic diastolic CHF  Essential hypertension  * PTA: carvedilol 12.5 mg BID, amlodipine 5 mg daily, irbesartan 300 mg daily, torsemide 20 mg BID, spironolactone 25 mg daily, atorvastatin 80 mg qhs  * Coronary angiogram (01/2020) showed no evidence for recurrent occlusive CAD  * TTE (11/2019) showed EF 55%, mild left atrial enlargement, mild MR, and trace TR  - Continues on PTA carvedilol [IV metoprolol while n.p.o.] and amlodipine  - Continue holding irbesartan and home diuretics until taking more PO  - Continue holding atorvastatin for now  - Hydralazine prn      Paroxysmal atrial fibrillation   * PTA: carvedilol 12.5 mg BID, apixaban 5 mg BID, amiodarone 200 Mg daily  - Continues on PTA carvedilol [metoprolol IV while n.p.o.], PTA amiodarone  - Holding apixaban in setting of ileus    Pyuria  Hematuria, Resolved  * UA ordered on 4/30 for hematuria which has resolved (suspect traumatic Gottlieb catheter placement which has since been removed); also showed large LE and 29 wbcs. Patient reported urgency, which seems to be chronic. No fever/leukocytosis noted, and urine culture returned negative. Antibiotics were deferred    COPD  * Chronic and stable on Incruse daily, albuterol prn  * Appears compensated without evidence of exacerbation     Prediabetes  Chronic and stable  - High SSI available, but blood sugars have been acceptable     Hypothyroidism  Chronic and stable on levothyroxine     Major recurrent depressive disorder with anxiety  Chronic and stable on  escitalopram      Macrocytosis  -Check B12.  Hemoglobin normal.     Diet: parenteral nutrition - ADULT compounded formula  NPO for Medical/Clinical Reasons Except for: No Exceptions  parenteral nutrition - ADULT compounded formula    DVT Prophylaxis: Enoxaparin (Lovenox) SQ  Gottlieb Catheter: Not present  Code Status: Full Code         Disposition: Expected discharge as per CRS    The patient's care was discussed with the Patient.    Sarah Armando MD  Hospitalist Service  [facility]  Contact information available via [facility] Paging/Directory    ______________________________________________________________________    Interval History   Patient had a rough last with increased nausea, vomiting.  NG tube replaced.  She is feeling much better this morning but is feeling quite discouraged with her progress so far.  Is asking when she can go home.    Data reviewed today: I reviewed all medications, new labs and imaging results over the last 24 hours. I personally reviewed the left hand XR image(s) showing no acute fractures or dislocations.    Physical Exam   Vital Signs: Temp: 98  F (36.7  C) Temp src: Oral BP: (!) 149/63 Pulse: 58   Resp: 16 SpO2: 92 % O2 Device: None (Room air)    Weight: 198 lbs 0 oz  Constitutional: Resting in bed, NAD  Respiratory: Breathing non-labored. Lungs CTAB  Cardiovascular: Heart RRR, no m/r/g. Minimal pedal edema  GI: Hypoactive BS, abd soft. Ostomy with green liquid output. HENNY drain with serous fluid.   Skin/Integument: No rash   Neuro: Alert and appropriate, SANCHEZ  Psych: Calm and cooperative    Data   Recent Labs   Lab 05/10/22  1221 05/10/22  0825 05/10/22  0714 05/09/22  1230 05/09/22  0833 05/08/22  2358 05/08/22  2238 05/08/22  0909 05/08/22  0628 05/07/22  1608 05/07/22  1243 05/05/22  0740 05/05/22  0710 05/04/22  1659 05/04/22  0715   WBC  --   --   --   --   --   --   --   --   --   --  9.9  --   --   --   --    HGB  --   --   --   --   --   --   --   --    --   --  13.0  --   --   --   --    MCV  --   --   --   --   --   --   --   --   --   --  102*  --   --   --   --    PLT  --   --  391  --   --   --   --   --   --   --  332  332  --   --   --  333   INR  --   --   --   --  0.99  --   --   --   --   --   --   --  0.98  --   --    NA  --   --  137  --  138  --   --   --  140  --  139   < > 138  --  133   POTASSIUM  --   --  4.0  --  3.9  --  3.6   < > 3.1*  --  3.9   < > 3.5  --  4.2   CHLORIDE  --   --  111*  --  114*  --   --   --  113*  --  115*   < > 110*  --  102   CO2  --   --  20  --  16*  --   --   --  19*  --  16*   < > 23  --  26   BUN  --   --  21  --  17  --   --   --  15  --  16   < > 27  --  24   CR  --   --  0.74  --  0.58  --   --   --  0.64  --  0.65   < > 1.76*  --  2.68*   ANIONGAP  --   --  6  --  8  --   --   --  8  --  8   < > 5  --  5   GISELLE  --   --  9.1  --  9.1  --   --   --  9.0  --  8.6   < > 8.3*  --  8.7   * 140* 142*   < > 121*   < >  --    < > 126*   < > 125*   < > 122*   < > 116*   ALBUMIN  --   --   --   --  2.1*  --   --   --   --   --   --   --  2.4*  --   --    PROTTOTAL  --   --   --   --  5.6*  --   --   --   --   --   --   --  5.7*  --   --    BILITOTAL  --   --   --   --  0.8  --   --   --   --   --   --   --  0.5  --   --    ALKPHOS  --   --   --   --  80  --   --   --   --   --   --   --  75  --   --    ALT  --   --   --   --  39  --   --   --   --   --   --   --  39  --   --    AST  --   --   --   --  39  --   --   --   --   --   --   --  23  --   --     < > = values in this interval not displayed.         Recent Results (from the past 24 hour(s))   XR Abdomen Port 1 View    Narrative    EXAM: XR ABDOMEN PORT 1 VIEWS  LOCATION: [facility]  DATE/TIME: 5/10/2022 3:30 AM    INDICATION: Enteric tube placement.  COMPARISON: X-ray abdomen single view 5/2/2022.      Impression    IMPRESSION: Enteric tube tip in the stomach, unchanged. Scattered gas within normal caliber bowel. Mild left convex  thoracolumbar curve. Right PICC tip in the distal SVC.        Medications     dextrose       IV infusion builder WITH additives 75 mL/hr at 05/10/22 1417     parenteral nutrition - ADULT compounded formula       parenteral nutrition - ADULT compounded formula 75 mL/hr at 05/09/22 1959       amiodarone  200 mg Oral Daily     amLODIPine  5 mg Oral Daily     [Held by provider] atorvastatin  80 mg Oral At Bedtime     [Held by provider] carvedilol  12.5 mg Oral BID w/meals     enoxaparin ANTICOAGULANT  40 mg Subcutaneous Q24H     escitalopram  10 mg Oral Daily     hydrALAZINE  5 mg Intravenous Q6H     [Held by provider] irbesartan  300 mg Oral At Bedtime     levothyroxine  25 mcg Oral QAM AC     lipids  250 mL Intravenous Once per day on Wed Sat     metoprolol  5 mg Intravenous Q6H     ondansetron  4 mg Intravenous Once     sodium chloride (PF)  10-40 mL Intracatheter Q7 Days     sodium chloride (PF)  3 mL Intracatheter Q8H     sodium chloride   Intravenous Q4H     [Held by provider] spironolactone  25 mg Oral Daily     [Held by provider] torsemide  20 mg Oral BID     umeclidinium  1 puff Inhalation Daily

## 2022-05-10 NOTE — PLAN OF CARE
Goal Outcome Evaluation:      Pt AO x4. VSS on RA. NPO but can have ice chips. Pt denies n/v. NG tube in place on LIS giving brown output. Pt unable to swallow medications this AM, crushed and administered via NG. Ostomy in place, red and protruding giving brown output. HENNY in place draining serosanguinous fluid. HENNY dressing changed x1 this shift. AM metoprolol dose held due to pulse being <60. Pt c/o bladder spasms when urinating but UA results came back normal. Pt voiding clear zoey urine. Pt ambulated dumont x1 this shift, SBA. Continue to monitor.

## 2022-05-10 NOTE — PROGRESS NOTES
"[facility]  WO Nurse Inpatient Ostomy Assessment     Assessment of new loop Ileostomy     Surgery date:  4/28/22  Surgeon:  Dr. Lay Connolly  Procedure:  Low anterior resection with diverting loop ileostomy  Related diagnosis:  Low rectal cancer    WO Assessment & Physical Exam:        Current status: Pt up in chair, NG replaced.        Date of last photo: 5/9/22        Stoma location: RUQ    Stoma size: 1 1/8\"     Stoma appearance: pink-red, round and edematous, on 5/9    Mucocutaneous junction:  Intact, on 5/9      Peristomal complication(s): none, on 5/9    Abdominal assessment: distended    Surgical site(s): open to air    NG still in place? Yes replaced    Output: brown, green and liquid     Pain: Cramping    Is patient still on a PCA? 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 Pt emptying pouch independently. Pt practiced emptying pouch at bedside with WOC. 5/9. Pt performed most of pouch change independently with cuing. Reviewed diet and hydration, and how to order supplies. 5/10 provided more information on how to order supplies and provided patient with more review of education due to forgetfulness.       Psychosocial: Patient is pleasant and interactive asking appropriate quesitons      WOC Plan:         Pouching product plan: [place] 2 piece 57mm flat with ring and high output pouch    Comments: monitor for abdominal changes that may require changes to pouching    Frequency of pouch changes: 2-3x weekly      Pt support system on discharge: spouse, however pt feels she should be able to manage.     WOC recommend home care?  yes      WOC follow-up plan: daily M-F      Objective Data:       Patient history according " to medical record: Janet Figueroa is a 71 year old female admitted on 4/28/2022. She has a past medical history of hypertension, mild COPD, CAD s/p PCI, dyslipidemia, paroxysmal A. Fib on eliquis, CKD, stage IIIb, renal artery stenosis, hypothyroidism, depression/anxiety, prediabetes, GERD, renal artery stenosis, who is status post low anterior resection with diverting loop ileostomy due to rectal cancer.      Current Diet/Nutrition:   Orders Placed This Encounter      NPO for Medical/Clinical Reasons Except for: No Exceptions       Output:  I/O last 3 completed shifts:  In: 1200 [P.O.:600]  Out: 4990 [Urine:600; Emesis/NG output:1400; Drains:190; Stool:2800]      Labs:   Recent Labs   Lab 05/09/22  0833 05/07/22  1243   ALBUMIN 2.1*  --    HGB  --  13.0   INR 0.99  --    WBC  --  9.9         Tsar Risk Assessment:   Sensory Perception: 4-->no impairment  Moisture: 3-->occasionally moist  Activity: 3-->walks occasionally  Mobility: 3-->slightly limited  Nutrition: 3-->adequate  Friction and Shear: 2-->potential problem  Star Score: 18      WOC Interventions:       Patient's chart evaluated    Focus of today's visit: verbal instruction , diet and hydration , odor/flatus management, lifestyle adjustments and discharge instructions     Pouch changed 5/9    Participant(s) in teaching session today: patient  and nurse    Change made with ostomy management today: No    Supplies: at bedside    Educational materials: lakisha folder at bedside.    Preparation for discharge: AVS completed    Registered for supply samples? [employer]    Discussed plan of care with: Patient and Nurse       Jeane Roach RN, CWOCN

## 2022-05-10 NOTE — PROGRESS NOTES
Chart reviewed  Large Stool output -2.8 L and large emesis 1.4L  Serum bicarb improved to 20     Urine pH of 5.5  UAG appropriate (-31)  No suggestion of RTA  NAGMA is related to bicarb loss in stool and likely better today d/t HCL loss with emesis       Recc -   Replace bicarb as needed ( oral Vs IV; oral will be tough now given GI issues) . Bicarb is okay today   Suggest 1/2 NS +75 meq Bicarb @ 75 ml/ hr , given high GI loss and Cr up slightly  Continue TPN    Will sign off. Plz call with qsts.       Shawanda Armstrong MD  [facility] - Nephrology   [phone]

## 2022-05-11 LAB
ANION GAP SERPL CALCULATED.3IONS-SCNC: 5 MMOL/L (ref 3–14)
BUN SERPL-MCNC: 16 MG/DL (ref 7–30)
CALCIUM SERPL-MCNC: 8.7 MG/DL (ref 8.5–10.1)
CHLORIDE BLD-SCNC: 112 MMOL/L (ref 94–109)
CO2 SERPL-SCNC: 22 MMOL/L (ref 20–32)
CREAT SERPL-MCNC: 0.65 MG/DL (ref 0.52–1.04)
GFR SERPL CREATININE-BSD FRML MDRD: >90 ML/MIN/1.73M2
GLUCOSE BLD-MCNC: 124 MG/DL (ref 70–99)
GLUCOSE BLDC GLUCOMTR-MCNC: 118 MG/DL (ref 70–99)
GLUCOSE BLDC GLUCOMTR-MCNC: 130 MG/DL (ref 70–99)
GLUCOSE BLDC GLUCOMTR-MCNC: 133 MG/DL (ref 70–99)
GLUCOSE BLDC GLUCOMTR-MCNC: 135 MG/DL (ref 70–99)
GLUCOSE BLDC GLUCOMTR-MCNC: 140 MG/DL (ref 70–99)
MAGNESIUM SERPL-MCNC: 2.1 MG/DL (ref 1.6–2.3)
PHOSPHATE SERPL-MCNC: 3.1 MG/DL (ref 2.5–4.5)
POTASSIUM BLD-SCNC: 3.7 MMOL/L (ref 3.4–5.3)
SODIUM SERPL-SCNC: 139 MMOL/L (ref 133–144)

## 2022-05-11 PROCEDURE — 250N000011 HC RX IP 250 OP 636: Performed by: COLON & RECTAL SURGERY

## 2022-05-11 PROCEDURE — 83735 ASSAY OF MAGNESIUM: CPT | Performed by: HOSPITALIST

## 2022-05-11 PROCEDURE — 82374 ASSAY BLOOD CARBON DIOXIDE: CPT | Performed by: PHYSICIAN ASSISTANT

## 2022-05-11 PROCEDURE — 250N000013 HC RX MED GY IP 250 OP 250 PS 637: Performed by: COLON & RECTAL SURGERY

## 2022-05-11 PROCEDURE — 250N000009 HC RX 250: Performed by: HOSPITALIST

## 2022-05-11 PROCEDURE — 250N000011 HC RX IP 250 OP 636: Performed by: STUDENT IN AN ORGANIZED HEALTH CARE EDUCATION/TRAINING PROGRAM

## 2022-05-11 PROCEDURE — 258N000001 HC RX 258: Performed by: INTERNAL MEDICINE

## 2022-05-11 PROCEDURE — 250N000011 HC RX IP 250 OP 636: Performed by: HOSPITALIST

## 2022-05-11 PROCEDURE — 999N000040 HC STATISTIC CONSULT NO CHARGE VASC ACCESS

## 2022-05-11 PROCEDURE — 82310 ASSAY OF CALCIUM: CPT | Performed by: PHYSICIAN ASSISTANT

## 2022-05-11 PROCEDURE — 999N000190 HC STATISTIC VAT ROUNDS

## 2022-05-11 PROCEDURE — 250N000013 HC RX MED GY IP 250 OP 250 PS 637: Performed by: NURSE PRACTITIONER

## 2022-05-11 PROCEDURE — 250N000009 HC RX 250

## 2022-05-11 PROCEDURE — 250N000009 HC RX 250: Performed by: INTERNAL MEDICINE

## 2022-05-11 PROCEDURE — 258N000003 HC RX IP 258 OP 636: Performed by: PHYSICIAN ASSISTANT

## 2022-05-11 PROCEDURE — 36415 COLL VENOUS BLD VENIPUNCTURE: CPT | Performed by: PHYSICIAN ASSISTANT

## 2022-05-11 PROCEDURE — 120N000001 HC R&B MED SURG/OB

## 2022-05-11 PROCEDURE — 99232 SBSQ HOSP IP/OBS MODERATE 35: CPT | Performed by: HOSPITALIST

## 2022-05-11 PROCEDURE — 84100 ASSAY OF PHOSPHORUS: CPT | Performed by: HOSPITALIST

## 2022-05-11 RX ADMIN — ENOXAPARIN SODIUM 40 MG: 40 INJECTION SUBCUTANEOUS at 17:44

## 2022-05-11 RX ADMIN — ACETAMINOPHEN 650 MG: 325 TABLET ORAL at 10:43

## 2022-05-11 RX ADMIN — SODIUM BICARBONATE: 84 INJECTION, SOLUTION INTRAVENOUS at 22:31

## 2022-05-11 RX ADMIN — AMIODARONE HYDROCHLORIDE 200 MG: 200 TABLET ORAL at 08:52

## 2022-05-11 RX ADMIN — ESCITALOPRAM OXALATE 10 MG: 10 TABLET ORAL at 08:52

## 2022-05-11 RX ADMIN — AMLODIPINE BESYLATE 5 MG: 5 TABLET ORAL at 08:52

## 2022-05-11 RX ADMIN — HYDROMORPHONE HYDROCHLORIDE 0.2 MG: 0.2 INJECTION, SOLUTION INTRAMUSCULAR; INTRAVENOUS; SUBCUTANEOUS at 20:16

## 2022-05-11 RX ADMIN — UMECLIDINIUM 1 PUFF: 62.5 AEROSOL, POWDER ORAL at 09:00

## 2022-05-11 RX ADMIN — MAGNESIUM SULFATE HEPTAHYDRATE: 500 INJECTION, SOLUTION INTRAMUSCULAR; INTRAVENOUS at 20:33

## 2022-05-11 RX ADMIN — METOPROLOL TARTRATE 5 MG: 5 INJECTION INTRAVENOUS at 10:43

## 2022-05-11 RX ADMIN — HYDRALAZINE HYDROCHLORIDE 5 MG: 20 INJECTION INTRAMUSCULAR; INTRAVENOUS at 04:48

## 2022-05-11 RX ADMIN — LEVOTHYROXINE SODIUM 25 MCG: 25 TABLET ORAL at 08:52

## 2022-05-11 RX ADMIN — ONDANSETRON 4 MG: 2 INJECTION INTRAMUSCULAR; INTRAVENOUS at 01:37

## 2022-05-11 RX ADMIN — SODIUM CHLORIDE, PRESERVATIVE FREE: 5 INJECTION INTRAVENOUS at 20:16

## 2022-05-11 RX ADMIN — HYDRALAZINE HYDROCHLORIDE 5 MG: 20 INJECTION INTRAMUSCULAR; INTRAVENOUS at 15:45

## 2022-05-11 RX ADMIN — METOPROLOL TARTRATE 5 MG: 5 INJECTION INTRAVENOUS at 22:33

## 2022-05-11 RX ADMIN — HYDROMORPHONE HYDROCHLORIDE 0.2 MG: 0.2 INJECTION, SOLUTION INTRAMUSCULAR; INTRAVENOUS; SUBCUTANEOUS at 14:30

## 2022-05-11 RX ADMIN — ONDANSETRON 4 MG: 2 INJECTION INTRAMUSCULAR; INTRAVENOUS at 20:30

## 2022-05-11 RX ADMIN — I.V. FAT EMULSION 250 ML: 20 EMULSION INTRAVENOUS at 20:43

## 2022-05-11 RX ADMIN — SODIUM BICARBONATE: 84 INJECTION, SOLUTION INTRAVENOUS at 07:56

## 2022-05-11 ASSESSMENT — ACTIVITIES OF DAILY LIVING (ADL)
ADLS_ACUITY_SCORE: 24
ADLS_ACUITY_SCORE: 21
ADLS_ACUITY_SCORE: 23
ADLS_ACUITY_SCORE: 23
ADLS_ACUITY_SCORE: 24
ADLS_ACUITY_SCORE: 21
ADLS_ACUITY_SCORE: 21
ADLS_ACUITY_SCORE: 23
ADLS_ACUITY_SCORE: 23
ADLS_ACUITY_SCORE: 24
ADLS_ACUITY_SCORE: 23
ADLS_ACUITY_SCORE: 21
ADLS_ACUITY_SCORE: 21
ADLS_ACUITY_SCORE: 25
ADLS_ACUITY_SCORE: 23
ADLS_ACUITY_SCORE: 23

## 2022-05-11 NOTE — PLAN OF CARE
Goal Outcome Evaluation:      Pt AO x4. VSS on RA. NPO except ice chips and small sips of water. Pain managed with tylenol and dilaudid. Had headache majority of the day, ice pack given and pt reports improvement, heat pack also given for knee pain. NG in place, alternating between being clamped for 4 hours and then 1 hour on LIS, brown output. HENNY removed. Ostomy red, giving brown output. Ambulated dumont x2. AM hydralazine held, PM metoprolol held. Pt up in chair, she is happy to be able to see out the window. Continue to monitor.

## 2022-05-11 NOTE — PLAN OF CARE
Goal Outcome Evaluation:    Pt is A&O x4. Reports forgetfulness at times r/t cancer and tx. Soft BP's not meeting parameters for ordered medications, Protocol followed and held medications as indicated. NG on low-intermittent suction with with brown output, client taking in ice chips, I&O monitored Q4H, remained within parameters- no need for IV replacement. Client reported one episode of nausea was given Zofran IV with effective results. Hypo active BS, client reports increased discomfort at time of nausea. Stoma WDL with 2 pice appliance, brown liquid output. HENNY intact to bulb suction with serosanguinous output. Redness at surgical sight, leakage of serosanguinous, dressing changed x 2. PICC line WDL, infusing, flushing, blood return noted. TPN and IVF as ordered.

## 2022-05-11 NOTE — PROGRESS NOTES
[facility]    Medicine Progress Note - Hospitalist Service       Date of Admission:  4/28/2022    Assessment & Plan   Janet Figueroa is a 71 year old female with complex PMHx including rectal cancer, CAD s/p PCI, paroxysmal a-fib on chronic anticoagulation with apixaban, renal artery stenosis, CKD, prediabetes, and COPD who was admitted to the Colorectal Surgery service on 4/28/2022 for low anterior resection with diverting loop ileostomy. Hospitalist service was consulted for post-op medical management. Hospital course has been complicated by prolonged post-op ileus      Rectal cancer s/p low anterior resection with diverting loop ileostomy on 4/28/2022  Post-operative ileus  * Underwent procedure under GETA by Dr. Connolly, minimal EBL noted  * Developed acute abdominal pain with nausea/vomiting on 5/2. NG tube placed.    * 5/6: NG tube removed with recurrence of nausea/vomiting  * 5/7: CT abd/pelvis showed small bowel obstruction  - No nausea/vomiting for ~24 hours now. Diet advanced to full liquids by CRS, 5/9.  However patient had worsening symptoms and hence made n.p.o. again and NG tube replaced.  - On TPN for supplemental nutrition per CRS  - Currently on enoxaparin for VTE ppx, plan to resume home apixaban per CRS once ileus resolves  -NG clamping trial plan for today, okay for ice chips, sips of clears during clamp trials.  HENNY drain has been removed.     Non-anion gap metabolic acidosis, resolved  -  ?increased acid production vs GI losses.  Lactate normal.  - Nephrology consult ordered.  Recommend replace bicarb as needed however oral will be tough given GI issues.  Nephrology has started on D5 half NS with 75 meq bicarb.  Nephrology has signed off.    Left thumb pain and swelling, suspect sprain, Improving  Developed persistent left thumb pain and swelling during this hospitalization after trying to push herself up. Left hand XR (5/6) showed no acute fractures or dislocations.  Suspect sprain - pain and swelling has improved with supportive cares  - Continue supportive cares: RICE, pain meds prn     KAMRON on CKD, stage IIIa, Resolved  History of fibromuscular dysplasia of the right renal artery  * Creatinine increased up to 2.68 from baseline ~1 in the setting of ileus with nausea/vomiting and decreased PO intake; corrected with IV fluids and TPN  - Creatinine now stable ~0.6 range      Hypokalemia, Resolved  Due to GI losses  - Potassium was replaced per protocol     CAD, native heart, native vessels s/p PCI (RCA, 11/2019; LCx, 07/2008)  Chronic diastolic CHF  Essential hypertension  * PTA: carvedilol 12.5 mg BID, amlodipine 5 mg daily, irbesartan 300 mg daily, torsemide 20 mg BID, spironolactone 25 mg daily, atorvastatin 80 mg qhs  * Coronary angiogram (01/2020) showed no evidence for recurrent occlusive CAD  * TTE (11/2019) showed EF 55%, mild left atrial enlargement, mild MR, and trace TR  - Continues on PTA carvedilol [IV metoprolol while n.p.o.] and amlodipine  - Continue holding irbesartan and home diuretics until taking more PO  - Continue holding atorvastatin for now  - Hydralazine prn      Paroxysmal atrial fibrillation   * PTA: carvedilol 12.5 mg BID, apixaban 5 mg BID, amiodarone 200 Mg daily  - Continues on PTA carvedilol [metoprolol IV while n.p.o.], PTA amiodarone  - Holding apixaban in setting of ileus    Pyuria  Hematuria, Resolved  * UA ordered on 4/30 for hematuria which has resolved (suspect traumatic Gottlieb catheter placement which has since been removed); also showed large LE and 29 wbcs. Patient reported urgency, which seems to be chronic. No fever/leukocytosis noted, and urine culture returned negative. Antibiotics were deferred    COPD  * Chronic and stable on Incruse daily, albuterol prn  * Appears compensated without evidence of exacerbation     Prediabetes  Chronic and stable  - High SSI available, but blood sugars have been acceptable     Hypothyroidism  Chronic  and stable on levothyroxine     Major recurrent depressive disorder with anxiety  Chronic and stable on escitalopram      Macrocytosis  - B12 level adequate.  Hemoglobin normal.     Diet: NPO for Medical/Clinical Reasons Except for: No Exceptions  parenteral nutrition - ADULT compounded formula  parenteral nutrition - ADULT compounded formula    DVT Prophylaxis: Enoxaparin (Lovenox) SQ  Gottlieb Catheter: Not present  Code Status: Full Code         Disposition: Expected discharge as per CRS    The patient's care was discussed with the Patient.    Sarah Armando MD  Hospitalist Service  [facility]  Contact information available via [facility] Paging/Directory    ______________________________________________________________________    Interval History   Patient is feeling much better today, states she is feeling the best she has felt in a long time.  Denies pain, nausea, bloating.  Is excited for NG clamp trial today.    Data reviewed today: I reviewed all medications, new labs and imaging results over the last 24 hours. I personally reviewed the left hand XR image(s) showing no acute fractures or dislocations.    Physical Exam   Vital Signs: Temp: 98.4  F (36.9  C) Temp src: Oral BP: (!) 150/59 Pulse: 67   Resp: 16 SpO2: 96 % O2 Device: None (Room air)    Weight: 198 lbs 0 oz  Constitutional: Resting in bed, NAD  Respiratory: Breathing non-labored. Lungs CTAB  Cardiovascular: Heart RRR, no m/r/g. Minimal pedal edema  GI: Hypoactive BS, abd soft. Ostomy with green liquid output. HENNY drain removed  Skin/Integument: No rash   Neuro: Alert and appropriate, SANCHEZ  Psych: Calm and cooperative    Data   Recent Labs   Lab 05/11/22  1204 05/11/22  0701 05/11/22  0357 05/10/22  0825 05/10/22  0714 05/09/22  1230 05/09/22  0833 05/07/22  1608 05/07/22  1243 05/05/22  0740 05/05/22  0710   WBC  --   --   --   --   --   --   --   --  9.9  --   --    HGB  --   --   --   --   --   --   --   --  13.0  --   --    MCV   --   --   --   --   --   --   --   --  102*  --   --    PLT  --   --   --   --  391  --   --   --  332  332  --   --    INR  --   --   --   --   --   --  0.99  --   --   --  0.98   NA  --  139  --   --  137  --  138   < > 139   < > 138   POTASSIUM  --  3.7  --   --  4.0  --  3.9   < > 3.9   < > 3.5   CHLORIDE  --  112*  --   --  111*  --  114*   < > 115*   < > 110*   CO2  --  22  --   --  20  --  16*   < > 16*   < > 23   BUN  --  16  --   --  21  --  17   < > 16   < > 27   CR  --  0.65  --   --  0.74  --  0.58   < > 0.65   < > 1.76*   ANIONGAP  --  5  --   --  6  --  8   < > 8   < > 5   GISELLE  --  8.7  --   --  9.1  --  9.1   < > 8.6   < > 8.3*   * 124* 140*   < > 142*   < > 121*   < > 125*   < > 122*   ALBUMIN  --   --   --   --   --   --  2.1*  --   --   --  2.4*   PROTTOTAL  --   --   --   --   --   --  5.6*  --   --   --  5.7*   BILITOTAL  --   --   --   --   --   --  0.8  --   --   --  0.5   ALKPHOS  --   --   --   --   --   --  80  --   --   --  75   ALT  --   --   --   --   --   --  39  --   --   --  39   AST  --   --   --   --   --   --  39  --   --   --  23    < > = values in this interval not displayed.         No results found for this or any previous visit (from the past 24 hour(s)).    Medications     dextrose       IV infusion builder WITH additives 75 mL/hr at 05/11/22 0756     parenteral nutrition - ADULT compounded formula       parenteral nutrition - ADULT compounded formula 75 mL/hr at 05/10/22 1950       amiodarone  200 mg Oral Daily     amLODIPine  5 mg Oral Daily     [Held by provider] atorvastatin  80 mg Oral At Bedtime     [Held by provider] carvedilol  12.5 mg Oral BID w/meals     enoxaparin ANTICOAGULANT  40 mg Subcutaneous Q24H     escitalopram  10 mg Oral Daily     hydrALAZINE  5 mg Intravenous Q6H     [Held by provider] irbesartan  300 mg Oral At Bedtime     levothyroxine  25 mcg Oral QAM AC     lipids  250 mL Intravenous Once per day on Wed Sat     metoprolol  5 mg Intravenous  Q6H     ondansetron  4 mg Intravenous Once     sodium chloride (PF)  10-40 mL Intracatheter Q7 Days     sodium chloride (PF)  3 mL Intracatheter Q8H     sodium chloride   Intravenous Q4H     [Held by provider] spironolactone  25 mg Oral Daily     [Held by provider] torsemide  20 mg Oral BID     umeclidinium  1 puff Inhalation Daily

## 2022-05-11 NOTE — PROGRESS NOTES
COLON & RECTAL SURGERY  PROGRESS NOTE    May 11, 2022  Post-op Day #13    SUBJECTIVE:  Feeling better with NGT in place. NGT output decreasing. No n/v. Would like ice chips and sips of clears.     OBJECTIVE:  Temp:  [97.8  F (36.6  C)-98.6  F (37  C)] 98.4  F (36.9  C)  Pulse:  [55-72] 67  Resp:  [14-18] 16  BP: (142-174)/(50-73) 142/57  SpO2:  [92 %-99 %] 96 %    Intake/Output Summary (Last 24 hours) at 5/11/2022 0825  Last data filed at 5/11/2022 0600  Gross per 24 hour   Intake 960 ml   Output 3210 ml   Net -2250 ml       GENERAL:  Awake, alert, no acute distress  HEAD: Normocephalic atraumatic  SCLERA: Anicteric  EXTREMITIES: Warm and well perfused  ABDOMEN:  Soft, appropriately tender, non-distended. No guarding, rigidity, or peritoneal signs. Stoma pink and protruding with liquid output. HENNY drain removed at bedside.   INCISION:  C/d/i    LABS:  Lab Results   Component Value Date    WBC 9.9 05/07/2022     Lab Results   Component Value Date    HGB 13.0 05/07/2022     Lab Results   Component Value Date    HCT 39.4 05/07/2022     Lab Results   Component Value Date     05/10/2022     Last Basic Metabolic Panel:  Lab Results   Component Value Date     05/11/2022      Lab Results   Component Value Date    POTASSIUM 3.7 05/11/2022     Lab Results   Component Value Date    CHLORIDE 112 05/11/2022     Lab Results   Component Value Date    GISELLE 8.7 05/11/2022     Lab Results   Component Value Date    CO2 22 05/11/2022     Lab Results   Component Value Date    BUN 16 05/11/2022     Lab Results   Component Value Date    CR 0.65 05/11/2022     Lab Results   Component Value Date     05/11/2022     05/11/2022       ASSESSMENT/PLAN: POD#12 robot LAR with DLI. Course complicated by ileus. NGT removed 5/6. Now with return of bowel function.      1. NPO, continue TPN  2. Will do NGT clamping trials today, DO NOT remove NG tube. Clamp NGT for 4 hours, replace on LIS for 1 hour. May repeat trial again this  afternoon/evening. Please put to suction overnight.  Please document output and symptoms in notes. Okay for ice chips and sips of clears during clamping trials.  3. IV PRN pain meds  4. Close monitoring of I/Os  5. OOB, ambulate  6. Continue Lovenox for ppx. Will plan to resume PTA Eliquis once ileus resolves and patient is tolerating adequate PO intake.   7. Fluid replacement with 1:0.5cc of NGT output >200mL with 0.9 NS every 4 hours  8. HENNY removed at bedside.     Seen and examined with Dr. Connolly.       For questions/paging, please contact the CRS office at [phone].    Olga Rangel PA-C  Colorectal Physician Assistant    [facility]  [address] [address]  T: [phone]  F: [phone]      Colon and Rectal Surgery Attending Note    Patient seen and examined independently.  Agree with above assessment and plan.  Feeling better with decreased NGT output. Up walking some  abd soft, incisions with bruising.  Plan as above.    Lay Connolly MD  [facility]  [address], [address]  [place] [address]  T: [phone]  F: [phone]   [email or web address]

## 2022-05-12 ENCOUNTER — APPOINTMENT (OUTPATIENT)
Dept: GENERAL RADIOLOGY | Facility: CLINIC | Age: 72
DRG: 330 | End: 2022-05-12
Attending: PHYSICIAN ASSISTANT
Payer: MEDICARE

## 2022-05-12 LAB
GLUCOSE BLDC GLUCOMTR-MCNC: 109 MG/DL (ref 70–99)
GLUCOSE BLDC GLUCOMTR-MCNC: 113 MG/DL (ref 70–99)
GLUCOSE BLDC GLUCOMTR-MCNC: 120 MG/DL (ref 70–99)
GLUCOSE BLDC GLUCOMTR-MCNC: 120 MG/DL (ref 70–99)
MAGNESIUM SERPL-MCNC: 1.8 MG/DL (ref 1.6–2.3)
PHOSPHATE SERPL-MCNC: 3.4 MG/DL (ref 2.5–4.5)
POTASSIUM BLD-SCNC: 3.6 MMOL/L (ref 3.4–5.3)
SARS-COV-2 RNA RESP QL NAA+PROBE: NEGATIVE

## 2022-05-12 PROCEDURE — 250N000011 HC RX IP 250 OP 636: Performed by: HOSPITALIST

## 2022-05-12 PROCEDURE — 84100 ASSAY OF PHOSPHORUS: CPT | Performed by: HOSPITALIST

## 2022-05-12 PROCEDURE — 83735 ASSAY OF MAGNESIUM: CPT | Performed by: HOSPITALIST

## 2022-05-12 PROCEDURE — 120N000001 HC R&B MED SURG/OB

## 2022-05-12 PROCEDURE — 250N000011 HC RX IP 250 OP 636: Performed by: COLON & RECTAL SURGERY

## 2022-05-12 PROCEDURE — 258N000001 HC RX 258: Performed by: INTERNAL MEDICINE

## 2022-05-12 PROCEDURE — 99233 SBSQ HOSP IP/OBS HIGH 50: CPT | Performed by: HOSPITALIST

## 2022-05-12 PROCEDURE — 250N000011 HC RX IP 250 OP 636: Performed by: PHYSICIAN ASSISTANT

## 2022-05-12 PROCEDURE — 250N000009 HC RX 250: Performed by: HOSPITALIST

## 2022-05-12 PROCEDURE — 250N000013 HC RX MED GY IP 250 OP 250 PS 637: Performed by: NURSE PRACTITIONER

## 2022-05-12 PROCEDURE — 250N000013 HC RX MED GY IP 250 OP 250 PS 637: Performed by: HOSPITALIST

## 2022-05-12 PROCEDURE — 84132 ASSAY OF SERUM POTASSIUM: CPT | Performed by: HOSPITALIST

## 2022-05-12 PROCEDURE — 36415 COLL VENOUS BLD VENIPUNCTURE: CPT | Performed by: HOSPITALIST

## 2022-05-12 PROCEDURE — G0463 HOSPITAL OUTPT CLINIC VISIT: HCPCS

## 2022-05-12 PROCEDURE — 999N000190 HC STATISTIC VAT ROUNDS

## 2022-05-12 PROCEDURE — U0005 INFEC AGEN DETEC AMPLI PROBE: HCPCS | Performed by: PHYSICIAN ASSISTANT

## 2022-05-12 PROCEDURE — 250N000009 HC RX 250

## 2022-05-12 PROCEDURE — 74018 RADEX ABDOMEN 1 VIEW: CPT

## 2022-05-12 PROCEDURE — 250N000009 HC RX 250: Performed by: INTERNAL MEDICINE

## 2022-05-12 RX ORDER — MORPHINE SULFATE 2 MG/ML
2 INJECTION, SOLUTION INTRAMUSCULAR; INTRAVENOUS
Status: DISCONTINUED | OUTPATIENT
Start: 2022-05-12 | End: 2022-05-17 | Stop reason: HOSPADM

## 2022-05-12 RX ORDER — AMLODIPINE BESYLATE 5 MG/1
5 TABLET ORAL DAILY
Status: DISCONTINUED | OUTPATIENT
Start: 2022-05-13 | End: 2022-05-17 | Stop reason: HOSPADM

## 2022-05-12 RX ORDER — LEVOTHYROXINE SODIUM 25 UG/1
25 TABLET ORAL
Status: DISCONTINUED | OUTPATIENT
Start: 2022-05-13 | End: 2022-05-17 | Stop reason: HOSPADM

## 2022-05-12 RX ORDER — ESCITALOPRAM OXALATE 10 MG/1
10 TABLET ORAL DAILY
Status: DISCONTINUED | OUTPATIENT
Start: 2022-05-13 | End: 2022-05-17 | Stop reason: HOSPADM

## 2022-05-12 RX ORDER — AMIODARONE HYDROCHLORIDE 200 MG/1
200 TABLET ORAL DAILY
Status: DISCONTINUED | OUTPATIENT
Start: 2022-05-13 | End: 2022-05-17 | Stop reason: HOSPADM

## 2022-05-12 RX ORDER — TRAMADOL HYDROCHLORIDE 50 MG/1
50 TABLET ORAL EVERY 6 HOURS PRN
Status: DISCONTINUED | OUTPATIENT
Start: 2022-05-12 | End: 2022-05-17 | Stop reason: HOSPADM

## 2022-05-12 RX ORDER — ACETAMINOPHEN 325 MG/1
650 TABLET ORAL EVERY 4 HOURS PRN
Status: DISCONTINUED | OUTPATIENT
Start: 2022-05-12 | End: 2022-05-17 | Stop reason: HOSPADM

## 2022-05-12 RX ORDER — LORAZEPAM 2 MG/ML
0.5 INJECTION INTRAMUSCULAR EVERY 6 HOURS PRN
Status: DISCONTINUED | OUTPATIENT
Start: 2022-05-12 | End: 2022-05-17 | Stop reason: HOSPADM

## 2022-05-12 RX ADMIN — ACETAMINOPHEN 650 MG: 325 TABLET ORAL at 18:33

## 2022-05-12 RX ADMIN — LORAZEPAM 0.5 MG: 2 INJECTION INTRAMUSCULAR; INTRAVENOUS at 10:38

## 2022-05-12 RX ADMIN — ONDANSETRON 4 MG: 2 INJECTION INTRAMUSCULAR; INTRAVENOUS at 05:08

## 2022-05-12 RX ADMIN — ENOXAPARIN SODIUM 40 MG: 40 INJECTION SUBCUTANEOUS at 16:47

## 2022-05-12 RX ADMIN — METOPROLOL TARTRATE 5 MG: 5 INJECTION INTRAVENOUS at 04:56

## 2022-05-12 RX ADMIN — MORPHINE SULFATE 2 MG: 2 INJECTION, SOLUTION INTRAMUSCULAR; INTRAVENOUS at 04:55

## 2022-05-12 RX ADMIN — HYDRALAZINE HYDROCHLORIDE 5 MG: 20 INJECTION INTRAMUSCULAR; INTRAVENOUS at 16:52

## 2022-05-12 RX ADMIN — CARVEDILOL 12.5 MG: 12.5 TABLET, FILM COATED ORAL at 18:18

## 2022-05-12 RX ADMIN — UMECLIDINIUM 1 PUFF: 62.5 AEROSOL, POWDER ORAL at 08:22

## 2022-05-12 RX ADMIN — MORPHINE SULFATE 2 MG: 2 INJECTION, SOLUTION INTRAMUSCULAR; INTRAVENOUS at 10:39

## 2022-05-12 RX ADMIN — ACETAMINOPHEN 650 MG: 325 TABLET ORAL at 13:47

## 2022-05-12 RX ADMIN — TRAMADOL HYDROCHLORIDE 50 MG: 50 TABLET, COATED ORAL at 13:47

## 2022-05-12 RX ADMIN — MAGNESIUM SULFATE HEPTAHYDRATE: 500 INJECTION, SOLUTION INTRAMUSCULAR; INTRAVENOUS at 20:14

## 2022-05-12 RX ADMIN — Medication 1 ML: at 08:44

## 2022-05-12 RX ADMIN — SODIUM BICARBONATE: 84 INJECTION, SOLUTION INTRAVENOUS at 14:11

## 2022-05-12 ASSESSMENT — ACTIVITIES OF DAILY LIVING (ADL)
ADLS_ACUITY_SCORE: 21
ADLS_ACUITY_SCORE: 21
ADLS_ACUITY_SCORE: 25
ADLS_ACUITY_SCORE: 21
ADLS_ACUITY_SCORE: 21
ADLS_ACUITY_SCORE: 25
ADLS_ACUITY_SCORE: 21

## 2022-05-12 NOTE — PROGRESS NOTES
CLINICAL NUTRITION SERVICES - REASSESSMENT NOTE      Malnutrition: (5/4)  % Weight Loss:  None noted  % Intake:  </= 50% for >/= 5 days (severe malnutrition)  Subcutaneous Fat Loss:  None observed  Muscle Loss:  None observed  Fluid Retention:  None noted     Malnutrition Diagnosis: Patient does not meet two of the above criteria necessary for diagnosing malnutrition       EVALUATION OF PROGRESS TOWARD GOALS   Diet:  NPO    Nutrition Support:  Patient continues on goal TPN as follows ~    Nutrition Support Parenteral:  Type of Access: PICC  Parenteral Frequency:  Continuous  Parenteral Regimen: Run at 75 mL/hr, 90 g AA/day, 270 g Dextrose/day + Lipid 250 mL 20% 2x per week   Total Parenteral Provisions: 1421 kcal (23 kcal/kg and 93% needs), 90 g protein (1.5 g/kg), 270 g CHO (GIR 3)      Intake/Tolerance:    K, Mg, Phos normal  BGM good  I/O 785/3300, wt 88.2 kg (stable) - Demadex   mL, stool 900 mL   Trial NG clamping yest - noted she took 120 mL po, did have some nausea last night         ASSESSED NUTRITION NEEDS:  Dosing Weight 61.3 kg   Estimated Energy Needs: 0811-7982 kcals (25-30 Kcal/Kg)  Justification: obese  Estimated Protein Needs: 75-90 grams protein (1.2-1.5 g pro/Kg)  Justification: post-op and hypercatabolism with acute illness      NEW FINDINGS:   5/10:  Abd x-ray = Enteric tube tip in the stomach, unchanged. Scattered gas within normal caliber bowel. Mild left convex thoracolumbar curve. Right PICC tip in the distal SVC.     D5 IVF was added on 5/10 and is running at 75 mL/hr= 90 g CHO, 306 kcal   Total (TPN/Lipid + D5 IVF)= 1727 kcal (28 kcal/kg)    Previous Goals (5/9):   TPN/Lipid will continue to meet % needs while intake minimal   Evaluation: Met    Previous Nutrition Diagnosis (5/9):   No nutrition diagnosis identified at this time  Evaluation: No change      CURRENT NUTRITION DIAGNOSIS  No nutrition diagnosis identified at this time    INTERVENTIONS  Recommendations / Nutrition  Prescription  Continue goal TPN regimen as above     Implementation  Collaboration and Referral of Nutrition care:  Discussed TPN with Pharmacist     Goals  TPN/Lipid + D5 IVF will continue to meet % needs while intake minimal     MONITORING AND EVALUATION:  Progress towards goals will be monitored and evaluated per protocol and Practice Guidelines    Kellie Quintero RD, LD, CNSC   Clinical Dietitian - [facility]

## 2022-05-12 NOTE — PROGRESS NOTES
"[facility]  WO Nurse Inpatient Ostomy Assessment     Assessment of new loop Ileostomy     Surgery date:  4/28/22  Surgeon:  Dr. Lay Connolly  Procedure:  Low anterior resection with diverting loop ileostomy  Related diagnosis:  Low rectal cancer    WO Assessment & Physical Exam:        Current status: Pt up in chair, NG replaced.        Date of last photo: 5/9/22        Stoma location: RUQ    Stoma size: 1 1/8\"     Stoma appearance: pink-red, round     Mucocutaneous junction:  Intact     Peristomal complication(s): none    Abdominal assessment: distended    Surgical site(s): open to air    NG still in place? Yes , clamped    Output: brown, green and liquid     Pain: Cramping    Is patient still on a PCA? 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 Pt emptying pouch independently. Pt practiced emptying pouch at bedside with WOC. 5/9. Pt performed most of pouch change independently with cuing.  Reviewed diet and hydration, and how to order supplies. 5/12 appeared to struggle more with pouch change than previous visit. Patient still performed most of pouch change but required more hands on assistance at time.       Psychosocial: Patient is pleasant and interactive asking appropriate questions. Forgetfulness persists, left sticky note for provider to consider cognitive eval.       WOC Plan:         Pouching product plan: [place] 2 piece 57mm flat with ring and high output pouch    Comments: monitor for abdominal changes that may require changes to pouching    Frequency of pouch changes: 2-3x weekly      Pt support system on discharge: spouse, however pt feels she should be able to manage.     WOC recommend home " care?  yes      WOC follow-up plan: daily M-F      Objective Data:       Patient history according to medical record: Janet Figueroa is a 71 year old female admitted on 4/28/2022. She has a past medical history of hypertension, mild COPD, CAD s/p PCI, dyslipidemia, paroxysmal A. Fib on eliquis, CKD, stage IIIb, renal artery stenosis, hypothyroidism, depression/anxiety, prediabetes, GERD, renal artery stenosis, who is status post low anterior resection with diverting loop ileostomy due to rectal cancer.      Current Diet/Nutrition:   Orders Placed This Encounter      NPO for Medical/Clinical Reasons Except for: Ice Chips, Meds       Output:  I/O last 3 completed shifts:  In: 785 [P.O.:120; NG/GT:40]  Out: 3300 [Urine:2100; Emesis/NG output:300; Stool:900]      Labs:   Recent Labs   Lab 05/09/22  0833 05/07/22  1243   ALBUMIN 2.1*  --    HGB  --  13.0   INR 0.99  --    WBC  --  9.9         Star Risk Assessment:   Sensory Perception: 4-->no impairment  Moisture: 3-->occasionally moist  Activity: 3-->walks occasionally  Mobility: 3-->slightly limited  Nutrition: 3-->adequate  Friction and Shear: 2-->potential problem  Star Score: 18      WOC Interventions:       Patient's chart evaluated    Focus of today's visit: pouch change return demonstration, verbal instruction , diet and hydration , pouch emptying, output measurement, odor/flatus management and lifestyle adjustments     Pouch changed 5/12    Participant(s) in teaching session today: patient  and nurse    Change made with ostomy management today: No    Supplies: at bedside    Educational materials: lakisha folder at bedside.    Preparation for discharge: AVS completed    Registered for supply samples? Needs [employer] secure start still, address does not seem to be an accurate address and need to discuss with patient    Discussed plan of care with: Patient and Nurse       Jeane Roach RN, CWOCN

## 2022-05-12 NOTE — PROGRESS NOTES
[facility]    Medicine Progress Note - Hospitalist Service       Date of Admission:  4/28/2022    Assessment & Plan   Janet Figueroa is a 71 year old female with complex PMHx including rectal cancer, CAD s/p PCI, paroxysmal a-fib on chronic anticoagulation with apixaban, renal artery stenosis, CKD, prediabetes, and COPD who was admitted to the Colorectal Surgery service on 4/28/2022 for low anterior resection with diverting loop ileostomy. Hospitalist service was consulted for post-op medical management. Hospital course has been complicated by prolonged post-op ileus      Rectal cancer s/p low anterior resection with diverting loop ileostomy on 4/28/2022  Post-operative ileus  * Underwent procedure under GETA by Dr. Connolly, minimal EBL noted  * Developed acute abdominal pain with nausea/vomiting on 5/2. NG tube placed.    * 5/6: NG tube removed with recurrence of nausea/vomiting  * 5/7: CT abd/pelvis showed small bowel obstruction  - No nausea/vomiting for ~24 hours now. Diet advanced to full liquids by CRS, 5/9.  However patient had worsening symptoms and hence made n.p.o. again and NG tube replaced.  - On TPN for supplemental nutrition per CRS  - Currently on enoxaparin for VTE ppx, plan to resume home apixaban per CRS once ileus resolves  -  HENNY drain has been removed.  -Continue  attempts at NG clamping trial per surgery    Headache  Patient complained of headache, jaw pain, feeling like she was having a strep throat on 5/12.  [place] that Dilaudid was making her nauseous and hence this was switched to morphine overnight.  Had difficulties associated with NG tube being dislodged and needing to be repositioned.  Suspect that the NG tube repositioning, prolonged n.p.o. status, poor sleep is resulting in her complaints of headache/jaw pain.  Will check CBC tomorrow.  She has been afebrile.  Repeat COVID swab on 5/12 negative.  -Changed pain meds to as needed tramadol via NG tube, has as  needed IV morphine available.  -Continue to monitor    Non-anion gap metabolic acidosis, resolved  -  ?increased acid production vs GI losses.  Lactate normal.  - Nephrology consult ordered.  Recommend replace bicarb as needed however oral will be tough given GI issues.  Nephrology has started on D5 half NS with 75 meq bicarb.  Nephrology has signed off.    Left thumb pain and swelling, suspect sprain, Improving  Developed persistent left thumb pain and swelling during this hospitalization after trying to push herself up. Left hand XR (5/6) showed no acute fractures or dislocations. Suspect sprain - pain and swelling has improved with supportive cares  - Continue supportive cares: RICE, pain meds prn     KAMRON on CKD, stage IIIa, Resolved  History of fibromuscular dysplasia of the right renal artery  * Creatinine increased up to 2.68 from baseline ~1 in the setting of ileus with nausea/vomiting and decreased PO intake; corrected with IV fluids and TPN  - Creatinine now stable ~0.6 range      Hypokalemia, Resolved  Due to GI losses  - Potassium was replaced per protocol     CAD, native heart, native vessels s/p PCI (RCA, 11/2019; LCx, 07/2008)  Chronic diastolic CHF  Essential hypertension  * PTA: carvedilol 12.5 mg BID, amlodipine 5 mg daily, irbesartan 300 mg daily, torsemide 20 mg BID, spironolactone 25 mg daily, atorvastatin 80 mg qhs  * Coronary angiogram (01/2020) showed no evidence for recurrent occlusive CAD  * TTE (11/2019) showed EF 55%, mild left atrial enlargement, mild MR, and trace TR  - Continues on PTA carvedilol  and amlodipine  - Continue holding irbesartan and home diuretics until taking more PO  - Continue holding atorvastatin for now  - Hydralazine prn      Paroxysmal atrial fibrillation   * PTA: carvedilol 12.5 mg BID, apixaban 5 mg BID, amiodarone 200 Mg daily  - Continues on PTA carvedilol, PTA amiodarone  - Holding apixaban in setting of ileus    Pyuria  Hematuria, Resolved  * UA ordered on  "4/30 for hematuria which has resolved (suspect traumatic Gottlieb catheter placement which has since been removed); also showed large LE and 29 wbcs. Patient reported urgency, which seems to be chronic. No fever/leukocytosis noted, and urine culture returned negative. Antibiotics were deferred    COPD  * Chronic and stable on Incruse daily, albuterol prn  * Appears compensated without evidence of exacerbation     Prediabetes  Chronic and stable  - High SSI available, but blood sugars have been acceptable     Hypothyroidism  Chronic and stable on levothyroxine     Major recurrent depressive disorder with anxiety  Chronic and stable on escitalopram    Macrocytosis  - B12 level adequate.  Hemoglobin normal.     Diet: parenteral nutrition - ADULT compounded formula  NPO for Medical/Clinical Reasons Except for: Ice Chips, Meds  parenteral nutrition - ADULT compounded formula    DVT Prophylaxis: Enoxaparin (Lovenox) SQ  Gottlieb Catheter: Not present  Code Status: Full Code         Disposition: Expected discharge as per CRS    The patient's care was discussed with the Patient.    Sarah Armando MD  Hospitalist Service  [facility]  Contact information available via [facility] Paging/Directory    ______________________________________________________________________    Interval History   Patient is not feeling well this morning, complains of headache, jaw pain, neck pain.  States she feels like she has \"strep throat\".  No fever.  Had difficulties associated with NG tube positioning last night, apparently got dislodged and had to be repositioned.  NG tube is giving her difficulty in swallowing and resulting in pills getting stuck.  Would prefer not to take anything orally while NG tube is in place.  Hence meds will be administered via NG tube.    Data reviewed today: I reviewed all medications, new labs and imaging results over the last 24 hours. I personally reviewed the left hand XR image(s) showing no " acute fractures or dislocations.    Physical Exam   Vital Signs: Temp: 99.2  F (37.3  C) Temp src: Oral BP: 136/59 Pulse: 66   Resp: 16 SpO2: 95 % O2 Device: None (Room air)    Weight: 194 lbs 7.13 oz  Constitutional: Resting in bed, NAD  Respiratory: Breathing non-labored. Lungs CTAB  Cardiovascular: Heart RRR, no m/r/g. Minimal pedal edema  GI: Hypoactive BS, abd soft. Ostomy with green liquid output.  Skin/Integument: No rash   Neuro: Alert and appropriate, SANCHEZ  Psych: Calm and cooperative    Data   Recent Labs   Lab 05/12/22  1206 05/12/22  0749 05/12/22  0742 05/11/22  2101 05/11/22  1204 05/11/22  0701 05/10/22  0825 05/10/22  0714 05/09/22  1230 05/09/22  0833 05/07/22  1608 05/07/22  1243   WBC  --   --   --   --   --   --   --   --   --   --   --  9.9   HGB  --   --   --   --   --   --   --   --   --   --   --  13.0   MCV  --   --   --   --   --   --   --   --   --   --   --  102*   PLT  --   --   --   --   --   --   --  391  --   --   --  332  332   INR  --   --   --   --   --   --   --   --   --  0.99  --   --    NA  --   --   --   --   --  139  --  137  --  138   < > 139   POTASSIUM  --  3.6  --   --   --  3.7  --  4.0  --  3.9   < > 3.9   CHLORIDE  --   --   --   --   --  112*  --  111*  --  114*   < > 115*   CO2  --   --   --   --   --  22  --  20  --  16*   < > 16*   BUN  --   --   --   --   --  16  --  21  --  17   < > 16   CR  --   --   --   --   --  0.65  --  0.74  --  0.58   < > 0.65   ANIONGAP  --   --   --   --   --  5  --  6  --  8   < > 8   GISELLE  --   --   --   --   --  8.7  --  9.1  --  9.1   < > 8.6   *  --  120* 133*   < > 124*   < > 142*   < > 121*   < > 125*   ALBUMIN  --   --   --   --   --   --   --   --   --  2.1*  --   --    PROTTOTAL  --   --   --   --   --   --   --   --   --  5.6*  --   --    BILITOTAL  --   --   --   --   --   --   --   --   --  0.8  --   --    ALKPHOS  --   --   --   --   --   --   --   --   --  80  --   --    ALT  --   --   --   --   --   --   --   --    --  39  --   --    AST  --   --   --   --   --   --   --   --   --  39  --   --     < > = values in this interval not displayed.         No results found for this or any previous visit (from the past 24 hour(s)).    Medications     dextrose       IV infusion builder WITH additives 75 mL/hr at 05/11/22 2231     parenteral nutrition - ADULT compounded formula       parenteral nutrition - ADULT compounded formula 75 mL/hr at 05/11/22 2033       [START ON 5/13/2022] amiodarone  200 mg Oral or NG Tube Daily     [START ON 5/13/2022] amLODIPine  5 mg Oral or NG Tube Daily     [Held by provider] atorvastatin  80 mg Oral At Bedtime     carvedilol  12.5 mg Oral BID w/meals     enoxaparin ANTICOAGULANT  40 mg Subcutaneous Q24H     [START ON 5/13/2022] escitalopram  10 mg Oral or NG Tube Daily     hydrALAZINE  5 mg Intravenous Q6H     [Held by provider] irbesartan  300 mg Oral At Bedtime     [START ON 5/13/2022] levothyroxine  25 mcg Oral or NG Tube QAM AC     lipids  250 mL Intravenous Once per day on Wed Sat     ondansetron  4 mg Intravenous Once     sodium chloride (PF)  10-40 mL Intracatheter Q7 Days     sodium chloride (PF)  3 mL Intracatheter Q8H     sodium chloride   Intravenous Q4H     [Held by provider] spironolactone  25 mg Oral Daily     [Held by provider] torsemide  20 mg Oral BID     umeclidinium  1 puff Inhalation Daily

## 2022-05-12 NOTE — PROGRESS NOTES
COLON & RECTAL SURGERY  PROGRESS NOTE    May 12, 2022  Post-op Day #14 robotic LAR with DLI    SUBJECTIVE:  NGT clamping trials yesterday, some nausea after pain meds. Patient states tube came out [facility] and had to be advanced which caused some pain. Minimal abdominal pain. Has headache, sinus pressure and jaw pain that is bothering her the most this morning.     OBJECTIVE:  Temp:  [98.9  F (37.2  C)-99.2  F (37.3  C)] 99.2  F (37.3  C)  Pulse:  [63-80] 63  Resp:  [16] 16  BP: (113-151)/(45-60) 138/52  SpO2:  [95 %-98 %] 95 %    Intake/Output Summary (Last 24 hours) at 5/12/2022 0819  Last data filed at 5/12/2022 0600  Gross per 24 hour   Intake 785 ml   Output 3300 ml   Net -2515 ml       GENERAL:  Awake, alert, no acute distress  HEAD: Normocephalic atraumatic  SCLERA: Anicteric  EXTREMITIES: Warm and well perfused  ABDOMEN:  Soft, appropriately tender, non-distended. No guarding, rigidity, or peritoneal signs. Stoma pink and protruding with dark liquid output present  INCISION:  C/d/i    LABS:  Lab Results   Component Value Date    WBC 9.9 05/07/2022     Lab Results   Component Value Date    HGB 13.0 05/07/2022     Lab Results   Component Value Date    HCT 39.4 05/07/2022     Lab Results   Component Value Date     05/10/2022     Last Basic Metabolic Panel:  Lab Results   Component Value Date     05/11/2022      Lab Results   Component Value Date    POTASSIUM 3.6 05/12/2022     Lab Results   Component Value Date    CHLORIDE 112 05/11/2022     Lab Results   Component Value Date    GISELLE 8.7 05/11/2022     Lab Results   Component Value Date    CO2 22 05/11/2022     Lab Results   Component Value Date    BUN 16 05/11/2022     Lab Results   Component Value Date    CR 0.65 05/11/2022     Lab Results   Component Value Date     05/12/2022     05/11/2022       ASSESSMENT/PLAN: POD#13 robot LAR with DLI. Course complicated by ileus. NGT removed 5/6. Now with return of bowel function.      1. NPO,  continue TPN  2. Will get KUB to confirm NG placement given repositioning overnight.   If NG in good position, then will proceed with clamping trial and possible removal if she tolerates.  Clamp NGT for 4 hours, replace on LIS for 60 min, if output <200 may remove NGT. If >200 or nausea, return to LIS.  3. IV PRN pain meds  4. Close monitoring of I/Os  5. OOB, ambulate  6. Continue Lovenox for ppx. Will plan to resume PTA Eliquis once ileus resolves and patient is tolerating adequate PO intake.   7. Fluid replacement with 1:0.5cc of NGT output >200mL with 0.9 NS every 4 hours  8. Hospitalist to evaluate head and jaw pain. I did order a COVID swab to r/o infection and it has been 7 days since her last COVID test.     Discussed with Dr. Connolly.     For questions/paging, please contact the CRS office at [phone].    Olga Rangel PA-C  Colorectal Physician Assistant    [facility]  [address] [address]  T: [phone]  F: [phone]      Colon and Rectal Surgery Attending Note    Patient seen and examined independently.  Agree with above assessment and plan.  Feeling better this afternoon. No nasuea. kub with ngt in th stomach  Stoma output poorly recorded.  yesterday, 100cc in the 6 hours  abd soft, stoma pink, bruising. Non-tender  Plan  ngt clamping trial  Continue TPN, minimize narcotics  loveonx for now.  Encourage ambulation and supportive cares.  Stoma care and teaching.    Lay Connolly MD  [facility]  [address], [address]  [place] [address]  T: [phone]  F: [phone]   [email or web address]

## 2022-05-12 NOTE — PROVIDER NOTIFICATION
MD Notification    Notified Person: MD    Notified Person Name: Dr. Krishnan    Notification Date/Time: 5/12/22 0000    Notification Interaction: page    Purpose of Notification: Pt would like different IV pain medication as dilaudid is making her nauseous     Orders Received: IV morphine     Comments:

## 2022-05-12 NOTE — PROGRESS NOTES
Care Management Follow Up    Length of Stay (days): 14    Expected Discharge Date: 05/16/2022     Concerns to be Addressed:       Patient plan of care discussed at interdisciplinary rounds: Yes    Anticipated Discharge Disposition: Home Care     Anticipated Discharge Services:    Anticipated Discharge DME:  Ostomy supplies as coordinated by WOC RN  Patient/family educated on Medicare website which has current facility and service quality ratings: yes  Education Provided on the Discharge Plan:    Patient/Family in Agreement with the Plan: yes    Referrals Placed by CM/SW: Homecare  Private pay costs discussed: Not applicable    Additional Information:  Phone call received from Mallorie at [facility] at phone [phone] and updated that discharge date a couple days out likely due to NPO status.     Fely Siegel RN   [facility]   Phone [phone]

## 2022-05-12 NOTE — PLAN OF CARE
Goal Outcome Evaluation:        Pt is A&O x4. Reports forgetfulness at times r/t cancer and tx. Nurse gave scheduled IV metoprolol or hydralazine based on parameters. Has frequent chills but no fever.  NG on low-intermittent suction with with brown output, pt eating ice chips, otherwise NPO. IV dilaudid given for abdomen, jaw, and head pain. Become nauseous immediatly after and gave IV zofran. Nurse got dilaudid switched to IV morphine however after getting that later in the night she become nauseous again and had to have zofran.  SBA to bathroom. Hypo active BS,Stoma WDL with 2 pice appliance, brown liquid output. PICC line WDL, infusing, flushing,  TPN and IVF as ordered.

## 2022-05-13 LAB
ANION GAP SERPL CALCULATED.3IONS-SCNC: 3 MMOL/L (ref 3–14)
BUN SERPL-MCNC: 15 MG/DL (ref 7–30)
CALCIUM SERPL-MCNC: 8.1 MG/DL (ref 8.5–10.1)
CHLORIDE BLD-SCNC: 109 MMOL/L (ref 94–109)
CO2 SERPL-SCNC: 29 MMOL/L (ref 20–32)
CREAT SERPL-MCNC: 0.7 MG/DL (ref 0.52–1.04)
ERYTHROCYTE [DISTWIDTH] IN BLOOD BY AUTOMATED COUNT: 12.7 % (ref 10–15)
GFR SERPL CREATININE-BSD FRML MDRD: >90 ML/MIN/1.73M2
GLUCOSE BLD-MCNC: 112 MG/DL (ref 70–99)
GLUCOSE BLDC GLUCOMTR-MCNC: 130 MG/DL (ref 70–99)
HCT VFR BLD AUTO: 33.1 % (ref 35–47)
HGB BLD-MCNC: 11 G/DL (ref 11.7–15.7)
MAGNESIUM SERPL-MCNC: 2 MG/DL (ref 1.6–2.3)
MCH RBC QN AUTO: 33.2 PG (ref 26.5–33)
MCHC RBC AUTO-ENTMCNC: 33.2 G/DL (ref 31.5–36.5)
MCV RBC AUTO: 100 FL (ref 78–100)
PHOSPHATE SERPL-MCNC: 3.2 MG/DL (ref 2.5–4.5)
PLATELET # BLD AUTO: 301 10E3/UL (ref 150–450)
POTASSIUM BLD-SCNC: 3.6 MMOL/L (ref 3.4–5.3)
RBC # BLD AUTO: 3.31 10E6/UL (ref 3.8–5.2)
SODIUM SERPL-SCNC: 141 MMOL/L (ref 133–144)
WBC # BLD AUTO: 6.5 10E3/UL (ref 4–11)

## 2022-05-13 PROCEDURE — 250N000013 HC RX MED GY IP 250 OP 250 PS 637: Performed by: NURSE PRACTITIONER

## 2022-05-13 PROCEDURE — 258N000003 HC RX IP 258 OP 636: Performed by: PHYSICIAN ASSISTANT

## 2022-05-13 PROCEDURE — 80048 BASIC METABOLIC PNL TOTAL CA: CPT | Performed by: PHYSICIAN ASSISTANT

## 2022-05-13 PROCEDURE — 99232 SBSQ HOSP IP/OBS MODERATE 35: CPT | Performed by: HOSPITALIST

## 2022-05-13 PROCEDURE — 36415 COLL VENOUS BLD VENIPUNCTURE: CPT | Performed by: PHYSICIAN ASSISTANT

## 2022-05-13 PROCEDURE — 83735 ASSAY OF MAGNESIUM: CPT | Performed by: COLON & RECTAL SURGERY

## 2022-05-13 PROCEDURE — 250N000011 HC RX IP 250 OP 636: Performed by: HOSPITALIST

## 2022-05-13 PROCEDURE — 120N000001 HC R&B MED SURG/OB

## 2022-05-13 PROCEDURE — 258N000001 HC RX 258: Performed by: INTERNAL MEDICINE

## 2022-05-13 PROCEDURE — 84100 ASSAY OF PHOSPHORUS: CPT | Performed by: COLON & RECTAL SURGERY

## 2022-05-13 PROCEDURE — 250N000009 HC RX 250

## 2022-05-13 PROCEDURE — 250N000009 HC RX 250: Performed by: INTERNAL MEDICINE

## 2022-05-13 PROCEDURE — 85014 HEMATOCRIT: CPT | Performed by: HOSPITALIST

## 2022-05-13 PROCEDURE — 250N000013 HC RX MED GY IP 250 OP 250 PS 637: Performed by: HOSPITALIST

## 2022-05-13 PROCEDURE — 250N000011 HC RX IP 250 OP 636: Performed by: COLON & RECTAL SURGERY

## 2022-05-13 PROCEDURE — 999N000127 HC STATISTIC PERIPHERAL IV START W US GUIDANCE

## 2022-05-13 RX ORDER — IRBESARTAN 150 MG/1
300 TABLET ORAL DAILY
Status: DISCONTINUED | OUTPATIENT
Start: 2022-05-13 | End: 2022-05-17 | Stop reason: HOSPADM

## 2022-05-13 RX ORDER — TORSEMIDE 20 MG/1
20 TABLET ORAL DAILY
Status: DISCONTINUED | OUTPATIENT
Start: 2022-05-13 | End: 2022-05-17 | Stop reason: HOSPADM

## 2022-05-13 RX ADMIN — AMIODARONE HYDROCHLORIDE 200 MG: 200 TABLET ORAL at 08:08

## 2022-05-13 RX ADMIN — IRBESARTAN 300 MG: 150 TABLET ORAL at 09:08

## 2022-05-13 RX ADMIN — HYDRALAZINE HYDROCHLORIDE 5 MG: 20 INJECTION INTRAMUSCULAR; INTRAVENOUS at 04:16

## 2022-05-13 RX ADMIN — LEVOTHYROXINE SODIUM 25 MCG: 25 TABLET ORAL at 09:08

## 2022-05-13 RX ADMIN — ACETAMINOPHEN 650 MG: 325 TABLET ORAL at 22:33

## 2022-05-13 RX ADMIN — AMLODIPINE BESYLATE 5 MG: 5 TABLET ORAL at 08:08

## 2022-05-13 RX ADMIN — TRAMADOL HYDROCHLORIDE 50 MG: 50 TABLET, COATED ORAL at 16:32

## 2022-05-13 RX ADMIN — MAGNESIUM SULFATE HEPTAHYDRATE: 500 INJECTION, SOLUTION INTRAMUSCULAR; INTRAVENOUS at 19:55

## 2022-05-13 RX ADMIN — ACETAMINOPHEN 650 MG: 325 TABLET ORAL at 13:18

## 2022-05-13 RX ADMIN — ESCITALOPRAM OXALATE 10 MG: 10 TABLET ORAL at 08:08

## 2022-05-13 RX ADMIN — UMECLIDINIUM 1 PUFF: 62.5 AEROSOL, POWDER ORAL at 08:11

## 2022-05-13 RX ADMIN — SODIUM CHLORIDE, PRESERVATIVE FREE: 5 INJECTION INTRAVENOUS at 11:21

## 2022-05-13 RX ADMIN — TORSEMIDE 20 MG: 20 TABLET ORAL at 17:46

## 2022-05-13 RX ADMIN — ENOXAPARIN SODIUM 40 MG: 40 INJECTION SUBCUTANEOUS at 16:32

## 2022-05-13 RX ADMIN — CARVEDILOL 12.5 MG: 12.5 TABLET, FILM COATED ORAL at 08:08

## 2022-05-13 RX ADMIN — SODIUM BICARBONATE: 84 INJECTION, SOLUTION INTRAVENOUS at 04:06

## 2022-05-13 RX ADMIN — CARVEDILOL 12.5 MG: 12.5 TABLET, FILM COATED ORAL at 17:47

## 2022-05-13 ASSESSMENT — ACTIVITIES OF DAILY LIVING (ADL)
ADLS_ACUITY_SCORE: 21

## 2022-05-13 NOTE — PLAN OF CARE
Goal Outcome Evaluation:        Pt is A&O x4. Reports forgetfulness at times r/t cancer and tx. Nurse gave scheduled IV metoprolol or hydralazine based on parameters. Has frequent chills but no fever.  Pt having occasional ice chips, otherwise NPO. Feels much better today. Did not need any pain meds and had no nausea.   Hypo active BS, Stoma WDL with 2 pice appliance, brown liquid output. Voiding fine, SBA. Went for short walk In hallway overnight.  PICC line WDL, infusing, TPN and IVF as ordered.

## 2022-05-13 NOTE — PLAN OF CARE
Patient AOX4, forgetful. Anxious but redirectable. VSS on RA. Up SBA in room. TPN infusing at 75mL/hr. IV fluids infusing at 75mL/hr. Patient complained of a headache as well as eye, jaw and throat pain. Utilizing chloraseptic spray for throat pain. Minimal nausea this AM but did not require and medications. She was nervous that the IV morphine would make her nauseous but it did not. NG placement was verified with portable xray today. Meds through tube per patient request as she was having difficulty swallowing. Patient tolerated 4hr clamping trial well with minimal NG out. NG was removed per order. Tolerating very minimal sips/chips. Voiding adequately. BM with ileostomy. She did tolerate meds PO after NG removal. Ambulating in hallway x3.

## 2022-05-13 NOTE — PROGRESS NOTES
A/Ox4 except forgetful at times, VSS on RA, pain managed with PRN Tylenol, denies n/V. Ambulated in hallway, ileostomy with small loose output, voiding in bathroom, incisions on abdomen FUAD. PICC infusing TPN and fluids, electrolyte recheck in AM. Tolerating clear liquid diet, continue plan of care.

## 2022-05-13 NOTE — PROGRESS NOTES
[id number]. VSS. A/O. SBA. TPN infusing. Incisions CDI. ileostomy with small output. Tolerating sips. Denies N/V.

## 2022-05-13 NOTE — PROGRESS NOTES
[facility]    Medicine Progress Note - Hospitalist Service       Date of Admission:  4/28/2022    Assessment & Plan   Janet Figueroa is a 71 year old female with complex PMHx including rectal cancer, CAD s/p PCI, paroxysmal a-fib on chronic anticoagulation with apixaban, renal artery stenosis, CKD, prediabetes, and COPD who was admitted to the Colorectal Surgery service on 4/28/2022 for low anterior resection with diverting loop ileostomy. Hospitalist service was consulted for post-op medical management. Hospital course has been complicated by prolonged post-op ileus      Rectal cancer s/p low anterior resection with diverting loop ileostomy on 4/28/2022  Post-operative ileus  * Underwent procedure under GETA by Dr. Connolly, minimal EBL noted  * Developed acute abdominal pain with nausea/vomiting on 5/2. NG tube placed.    * 5/6: NG tube removed with recurrence of nausea/vomiting  * 5/7: CT abd/pelvis showed small bowel obstruction  - No nausea/vomiting for ~24 hours now. Diet advanced to full liquids by CRS, 5/9.  However patient had worsening symptoms and hence made n.p.o. again and NG tube replaced.  - On TPN for supplemental nutrition per CRS  - Currently on enoxaparin for VTE ppx, plan to resume home apixaban per CRS once ileus resolves  -  HENNY drain has been removed.  -5/13: Patient is progressing well.  NG tube clamping trial passed and NG tube was removed on 5/12.  Currently on clear liquid diet.    Headache, resolved  Patient complained of headache, jaw pain, feeling like she was having a strep throat on 5/12.  [facility] that Dilaudid was making her nauseous and hence this was switched to morphine overnight.  Had difficulties associated with NG tube being dislodged and needing to be repositioned.  Suspect that the NG tube repositioning, prolonged n.p.o. status, poor sleep is resulting in her complaints of headache/jaw pain.  CBC unremarkable.  She has been afebrile.  Repeat COVID swab  on 5/12 negative.  -Changed pain meds to as needed tramadol via NG tube, has as needed IV morphine available.  -Above symptoms resolved by 5/13.    Non-anion gap metabolic acidosis, resolved  -  ?increased acid production vs GI losses.  Lactate normal.  - Nephrology consult ordered.  Recommend replace bicarb as needed however oral will be tough given GI issues.  Nephrology has started on D5 half NS with 75 meq bicarb.  Nephrology has signed off.  -IVF discontinued 5/13.    Left thumb pain and swelling, suspect sprain, Improving  Developed persistent left thumb pain and swelling during this hospitalization after trying to push herself up. Left hand XR (5/6) showed no acute fractures or dislocations. Suspect sprain - pain and swelling has improved with supportive cares  - Continue supportive cares: RICE, pain meds prn     KAMRON on CKD, stage IIIa, Resolved  History of fibromuscular dysplasia of the right renal artery  * Creatinine increased up to 2.68 from baseline ~1 in the setting of ileus with nausea/vomiting and decreased PO intake; corrected with IV fluids and TPN  - Creatinine now stable ~0.6 range      Hypokalemia, Resolved  Due to GI losses  - Potassium was replaced per protocol     CAD, native heart, native vessels s/p PCI (RCA, 11/2019; LCx, 07/2008)  Chronic diastolic CHF  Essential hypertension  * PTA: carvedilol 12.5 mg BID, amlodipine 5 mg daily, irbesartan 300 mg daily, torsemide 20 mg BID, spironolactone 25 mg daily, atorvastatin 80 mg qhs  * Coronary angiogram (01/2020) showed no evidence for recurrent occlusive CAD  * TTE (11/2019) showed EF 55%, mild left atrial enlargement, mild MR, and trace TR  - Continues on PTA carvedilol  and amlodipine  -Will resume irbesartan and torsemide once daily on 5/13.  Holding spironolactone.  - Continue holding atorvastatin for now  - Hydralazine prn      Paroxysmal atrial fibrillation   * PTA: carvedilol 12.5 mg BID, apixaban 5 mg BID, amiodarone 200 Mg daily  -  Continues on PTA carvedilol, PTA amiodarone  - Holding apixaban in setting of ileus    Pyuria  Hematuria, Resolved  * UA ordered on 4/30 for hematuria which has resolved (suspect traumatic Gottlieb catheter placement which has since been removed); also showed large LE and 29 wbcs. Patient reported urgency, which seems to be chronic. No fever/leukocytosis noted, and urine culture returned negative. Antibiotics were deferred    COPD  * Chronic and stable on Incruse daily, albuterol prn  * Appears compensated without evidence of exacerbation     Prediabetes  Chronic and stable  - High SSI available, but blood sugars have been acceptable     Hypothyroidism  Chronic and stable on levothyroxine     Major recurrent depressive disorder with anxiety  Chronic and stable on escitalopram    Macrocytosis  - B12 level adequate.  Hemoglobin normal.     Diet: parenteral nutrition - ADULT compounded formula  Clear Liquid Diet  Snacks/Supplements Pediatric: Ensure Clear; With Meals  parenteral nutrition - ADULT compounded formula    DVT Prophylaxis: Enoxaparin (Lovenox) SQ  Gottlieb Catheter: Not present  Code Status: Full Code         Disposition: Expected discharge as per CRS    The patient's care was discussed with the Patient.    Sarah Armando MD  Hospitalist Service  [facility]  Contact information available via [facility] Paging/Directory    ______________________________________________________________________    Interval History   Patient feeling significantly improved today.  NG tube out yesterday.  Is feeling quite pleased with her progress today and is ambulating well.  Headache, throat pain resolved.    Data reviewed today: I reviewed all medications, new labs and imaging results over the last 24 hours. I personally reviewed the left hand XR image(s) showing no acute fractures or dislocations.    Physical Exam   Vital Signs: Temp: 98.2  F (36.8  C) Temp src: Oral BP: (!) 167/71 Pulse: 58   Resp: 18 SpO2:  98 % O2 Device: None (Room air)    Weight: 196 lbs 6.4 oz  Constitutional: Resting in bed, NAD  Respiratory: Breathing non-labored. Lungs CTAB  Cardiovascular: Heart RRR, no m/r/g. Minimal pedal edema  GI: Hypoactive BS, abd soft. Ostomy with green liquid output.  Skin/Integument: No rash   Neuro: Alert and appropriate, SANCHEZ  Psych: Calm and cooperative    Data   Recent Labs   Lab 05/13/22  0731 05/13/22  0541 05/12/22  2118 05/12/22  1206 05/12/22  0749 05/11/22  1204 05/11/22  0701 05/10/22  0825 05/10/22  0714 05/09/22  1230 05/09/22  0833 05/07/22  1608 05/07/22  1243   WBC 6.5  --   --   --   --   --   --   --   --   --   --   --  9.9   HGB 11.0*  --   --   --   --   --   --   --   --   --   --   --  13.0     --   --   --   --   --   --   --   --   --   --   --  102*     --   --   --   --   --   --   --  391  --   --   --  332  332   INR  --   --   --   --   --   --   --   --   --   --  0.99  --   --      --   --   --   --   --  139  --  137  --  138   < > 139   POTASSIUM 3.6  --   --   --  3.6  --  3.7  --  4.0  --  3.9   < > 3.9   CHLORIDE 109  --   --   --   --   --  112*  --  111*  --  114*   < > 115*   CO2 29  --   --   --   --   --  22  --  20  --  16*   < > 16*   BUN 15  --   --   --   --   --  16  --  21  --  17   < > 16   CR 0.70  --   --   --   --   --  0.65  --  0.74  --  0.58   < > 0.65   ANIONGAP 3  --   --   --   --   --  5  --  6  --  8   < > 8   GISELLE 8.1*  --   --   --   --   --  8.7  --  9.1  --  9.1   < > 8.6   * 130* 109*   < >  --    < > 124*   < > 142*   < > 121*   < > 125*   ALBUMIN  --   --   --   --   --   --   --   --   --   --  2.1*  --   --    PROTTOTAL  --   --   --   --   --   --   --   --   --   --  5.6*  --   --    BILITOTAL  --   --   --   --   --   --   --   --   --   --  0.8  --   --    ALKPHOS  --   --   --   --   --   --   --   --   --   --  80  --   --    ALT  --   --   --   --   --   --   --   --   --   --  39  --   --    AST  --   --   --   --    --   --   --   --   --   --  39  --   --     < > = values in this interval not displayed.         No results found for this or any previous visit (from the past 24 hour(s)).    Medications     dextrose       parenteral nutrition - ADULT compounded formula       parenteral nutrition - ADULT compounded formula 75 mL/hr at 05/12/22 2014       amiodarone  200 mg Oral or NG Tube Daily     amLODIPine  5 mg Oral or NG Tube Daily     [Held by provider] atorvastatin  80 mg Oral At Bedtime     carvedilol  12.5 mg Oral BID w/meals     enoxaparin ANTICOAGULANT  40 mg Subcutaneous Q24H     escitalopram  10 mg Oral or NG Tube Daily     irbesartan  300 mg Oral Daily     levothyroxine  25 mcg Oral or NG Tube QAM AC     lipids  250 mL Intravenous Once per day on Wed Sat     ondansetron  4 mg Intravenous Once     sodium chloride (PF)  10-40 mL Intracatheter Q7 Days     sodium chloride (PF)  3 mL Intracatheter Q8H     sodium chloride   Intravenous Q4H     [Held by provider] spironolactone  25 mg Oral Daily     [Held by provider] torsemide  20 mg Oral BID     umeclidinium  1 puff Inhalation Daily

## 2022-05-13 NOTE — PROGRESS NOTES
COLON & RECTAL SURGERY  PROGRESS NOTE    May 13, 2022  Post-op Day # 15    SUBJECTIVE:  Feeling the best she has since surgery. NGT removed after clamping trial yesterday, tolerating sips of clears without n/v. Headache and sinus pain resolved with tramadol. Stoma functioning, looking forward to changing the appliance by herself today. Good UOP. Ambulating. AVSS.    OBJECTIVE:  Temp:  [97.5  F (36.4  C)-98.7  F (37.1  C)] 97.5  F (36.4  C)  Pulse:  [57-77] 58  Resp:  [16] 16  BP: (112-160)/(48-66) 153/62  SpO2:  [95 %-96 %] 96 %    Intake/Output Summary (Last 24 hours) at 5/13/2022 1022  Last data filed at 5/13/2022 0841  Gross per 24 hour   Intake 925 ml   Output 2775 ml   Net -1850 ml       GENERAL:  Awake, alert, no acute distress  HEAD: Normocephalic atraumatic  SCLERA: Anicteric  EXTREMITIES: Warm and well perfused  ABDOMEN:  Soft, non-tender, non-distended. No guarding, rigidity, or peritoneal signs. Stoma pink with gas and liquid stool.  INCISION:  C/d/i    LABS:  Lab Results   Component Value Date    WBC 6.5 05/13/2022     Lab Results   Component Value Date    HGB 11.0 05/13/2022     Lab Results   Component Value Date    HCT 33.1 05/13/2022     Lab Results   Component Value Date     05/13/2022     Last Basic Metabolic Panel:  Lab Results   Component Value Date     05/13/2022      Lab Results   Component Value Date    POTASSIUM 3.6 05/13/2022     Lab Results   Component Value Date    CHLORIDE 109 05/13/2022     Lab Results   Component Value Date    GISELLE 8.1 05/13/2022     Lab Results   Component Value Date    CO2 29 05/13/2022     Lab Results   Component Value Date    BUN 15 05/13/2022     Lab Results   Component Value Date    CR 0.70 05/13/2022     Lab Results   Component Value Date     05/13/2022     05/13/2022       ASSESSMENT/PLAN: POD#15 robot LAR with DLI. Course complicated by ileus. NGT removed 5/6, replaced, removed again 5/12. Now with return of bowel function.     1. Clear  liquid diet, advised to go slow  2. PRN pain meds  3. Continue TPN and IVF until adequate PO intake  4. Close monitoring of I/Os  5. OOB, ambulate  6. Continue Lovenox for ppx. Will plan to resume PTA Eliquis once ileus resolves and patient is tolerating adequate PO intake.   7. Stoma cares and teaching    Will discuss with Dr. Connolly.    For questions/paging, please contact the CRS office at [phone].    Jayce Bundy PA-C  Colorectal Physician Assistant    [facility]  [address] [facility] [address]  [place] [address]  T: [phone]  F: [phone]        Colon and Rectal Surgery Attending Note    Patient seen and examined independently.  Agree with above assessment and plan.  POD #15 after LAR with DLI  Resolving ileus.  Feeling much better with ngt out, voiding well  abd soft with a pink and productive stoma  Plan as above.   Hopeful for discharge early next week once tolerating adequate PO and managing the stoma.   Encourage ambulation, minimize narcotics.      Lay Connolly MD  [facility]  [address], [address]  [place] [address]  T: [phone]  F: [phone]   [email or web address]

## 2022-05-14 LAB
ANION GAP SERPL CALCULATED.3IONS-SCNC: 6 MMOL/L (ref 3–14)
BUN SERPL-MCNC: 17 MG/DL (ref 7–30)
CALCIUM SERPL-MCNC: 8.3 MG/DL (ref 8.5–10.1)
CHLORIDE BLD-SCNC: 106 MMOL/L (ref 94–109)
CO2 SERPL-SCNC: 27 MMOL/L (ref 20–32)
CREAT SERPL-MCNC: 0.8 MG/DL (ref 0.52–1.04)
GFR SERPL CREATININE-BSD FRML MDRD: 77 ML/MIN/1.73M2
GLUCOSE BLD-MCNC: 108 MG/DL (ref 70–99)
GLUCOSE BLDC GLUCOMTR-MCNC: 100 MG/DL (ref 70–99)
GLUCOSE BLDC GLUCOMTR-MCNC: 114 MG/DL (ref 70–99)
GLUCOSE BLDC GLUCOMTR-MCNC: 97 MG/DL (ref 70–99)
MAGNESIUM SERPL-MCNC: 2 MG/DL (ref 1.6–2.3)
MAGNESIUM SERPL-MCNC: 2.1 MG/DL (ref 1.6–2.3)
PHOSPHATE SERPL-MCNC: 3.6 MG/DL (ref 2.5–4.5)
PHOSPHATE SERPL-MCNC: 6 MG/DL (ref 2.5–4.5)
POTASSIUM BLD-SCNC: 3.6 MMOL/L (ref 3.4–5.3)
POTASSIUM BLD-SCNC: 5.3 MMOL/L (ref 3.4–5.3)
SODIUM SERPL-SCNC: 139 MMOL/L (ref 133–144)

## 2022-05-14 PROCEDURE — 250N000013 HC RX MED GY IP 250 OP 250 PS 637: Performed by: HOSPITALIST

## 2022-05-14 PROCEDURE — 250N000013 HC RX MED GY IP 250 OP 250 PS 637: Performed by: NURSE PRACTITIONER

## 2022-05-14 PROCEDURE — 84100 ASSAY OF PHOSPHORUS: CPT

## 2022-05-14 PROCEDURE — 99232 SBSQ HOSP IP/OBS MODERATE 35: CPT | Performed by: HOSPITALIST

## 2022-05-14 PROCEDURE — 82310 ASSAY OF CALCIUM: CPT

## 2022-05-14 PROCEDURE — 250N000009 HC RX 250

## 2022-05-14 PROCEDURE — 83735 ASSAY OF MAGNESIUM: CPT | Performed by: HOSPITALIST

## 2022-05-14 PROCEDURE — 36415 COLL VENOUS BLD VENIPUNCTURE: CPT

## 2022-05-14 PROCEDURE — 999N000190 HC STATISTIC VAT ROUNDS

## 2022-05-14 PROCEDURE — 83735 ASSAY OF MAGNESIUM: CPT

## 2022-05-14 PROCEDURE — 84100 ASSAY OF PHOSPHORUS: CPT | Performed by: HOSPITALIST

## 2022-05-14 PROCEDURE — 80048 BASIC METABOLIC PNL TOTAL CA: CPT | Performed by: HOSPITALIST

## 2022-05-14 PROCEDURE — 120N000001 HC R&B MED SURG/OB

## 2022-05-14 PROCEDURE — 250N000011 HC RX IP 250 OP 636: Performed by: COLON & RECTAL SURGERY

## 2022-05-14 RX ADMIN — AMLODIPINE BESYLATE 5 MG: 5 TABLET ORAL at 08:33

## 2022-05-14 RX ADMIN — ACETAMINOPHEN 650 MG: 325 TABLET ORAL at 03:55

## 2022-05-14 RX ADMIN — I.V. FAT EMULSION 250 ML: 20 EMULSION INTRAVENOUS at 20:14

## 2022-05-14 RX ADMIN — TRAMADOL HYDROCHLORIDE 50 MG: 50 TABLET, COATED ORAL at 04:39

## 2022-05-14 RX ADMIN — AMIODARONE HYDROCHLORIDE 200 MG: 200 TABLET ORAL at 08:33

## 2022-05-14 RX ADMIN — TORSEMIDE 20 MG: 20 TABLET ORAL at 08:33

## 2022-05-14 RX ADMIN — ESCITALOPRAM OXALATE 10 MG: 10 TABLET ORAL at 08:33

## 2022-05-14 RX ADMIN — IRBESARTAN 300 MG: 150 TABLET ORAL at 21:30

## 2022-05-14 RX ADMIN — ENOXAPARIN SODIUM 40 MG: 40 INJECTION SUBCUTANEOUS at 17:39

## 2022-05-14 RX ADMIN — LEVOTHYROXINE SODIUM 25 MCG: 25 TABLET ORAL at 09:08

## 2022-05-14 RX ADMIN — CARVEDILOL 12.5 MG: 12.5 TABLET, FILM COATED ORAL at 17:40

## 2022-05-14 RX ADMIN — CARVEDILOL 12.5 MG: 12.5 TABLET, FILM COATED ORAL at 08:33

## 2022-05-14 RX ADMIN — TRAMADOL HYDROCHLORIDE 50 MG: 50 TABLET, COATED ORAL at 22:16

## 2022-05-14 RX ADMIN — MAGNESIUM SULFATE HEPTAHYDRATE: 500 INJECTION, SOLUTION INTRAMUSCULAR; INTRAVENOUS at 20:13

## 2022-05-14 RX ADMIN — UMECLIDINIUM 1 PUFF: 62.5 AEROSOL, POWDER ORAL at 08:34

## 2022-05-14 ASSESSMENT — ACTIVITIES OF DAILY LIVING (ADL)
ADLS_ACUITY_SCORE: 21

## 2022-05-14 NOTE — PROGRESS NOTES
[facility]    Medicine Progress Note - Hospitalist Service       Date of Admission:  4/28/2022    Assessment & Plan   Janet Figueroa is a 71 year old female with complex PMHx including rectal cancer, CAD s/p PCI, paroxysmal a-fib on chronic anticoagulation with apixaban, renal artery stenosis, CKD, prediabetes, and COPD who was admitted to the Colorectal Surgery service on 4/28/2022 for low anterior resection with diverting loop ileostomy. Hospitalist service was consulted for post-op medical management. Hospital course has been complicated by prolonged post-op ileus      Rectal cancer s/p low anterior resection with diverting loop ileostomy on 4/28/2022  Post-operative ileus  * Underwent procedure under GETA by Dr. Connolly, minimal EBL noted  * Developed acute abdominal pain with nausea/vomiting on 5/2. NG tube placed.    * 5/6: NG tube removed with recurrence of nausea/vomiting  * 5/7: CT abd/pelvis showed small bowel obstruction  - No nausea/vomiting for ~24 hours now. Diet advanced to full liquids by CRS, 5/9.  However patient had worsening symptoms and hence made n.p.o. again and NG tube replaced.  - On TPN for supplemental nutrition per CRS  - Currently on enoxaparin for VTE ppx, plan to resume home apixaban per CRS once ileus resolves  -  HENNY drain has been removed.  - Patient is progressing well.  NG tube clamping trial passed and NG tube was removed on 5/12.  Currently on full liquid diet.    Non-anion gap metabolic acidosis, resolved  -  ?increased acid production vs GI losses.  Lactate normal.  - Nephrology consult ordered.  Recommend replace bicarb as needed however oral will be tough given GI issues.  Nephrology has started on D5 half NS with 75 meq bicarb.  Nephrology has signed off.  -IVF discontinued 5/13.    Left thumb pain and swelling, suspect sprain, Improving  Developed persistent left thumb pain and swelling during this hospitalization after trying to push herself up.  Left hand XR (5/6) showed no acute fractures or dislocations. Suspect sprain - pain and swelling has improved with supportive cares  - Continue supportive cares: RICE, pain meds prn     KAMRON on CKD, stage IIIa, Resolved  History of fibromuscular dysplasia of the right renal artery  * Creatinine increased up to 2.68 from baseline ~1 in the setting of ileus with nausea/vomiting and decreased PO intake; corrected with IV fluids and TPN  - Creatinine now stable ~0.6 range      Hypokalemia, Resolved  Due to GI losses  - Potassium was replaced per protocol     CAD, native heart, native vessels s/p PCI (RCA, 11/2019; LCx, 07/2008)  Chronic diastolic CHF  Essential hypertension  * PTA: carvedilol 12.5 mg BID, amlodipine 5 mg daily, irbesartan 300 mg daily, torsemide 20 mg BID, spironolactone 25 mg daily, atorvastatin 80 mg qhs  * Coronary angiogram (01/2020) showed no evidence for recurrent occlusive CAD  * TTE (11/2019) showed EF 55%, mild left atrial enlargement, mild MR, and trace TR  - Continues on PTA carvedilol  and amlodipine  -Resumed irbesartan and torsemide once daily on 5/13.  Holding spironolactone.  Patient notes increased frequency of urination, denies any dysuria symptoms.  Noted recent UA was negative for infection.  - Continue holding atorvastatin for now  - Hydralazine prn      Paroxysmal atrial fibrillation   * PTA: carvedilol 12.5 mg BID, apixaban 5 mg BID, amiodarone 200 Mg daily  - Continues on PTA carvedilol, PTA amiodarone  - Holding apixaban in setting of ileus    Pyuria  Hematuria, Resolved  * UA ordered on 4/30 for hematuria which has resolved (suspect traumatic Gottlieb catheter placement which has since been removed); also showed large LE and 29 wbcs. Patient reported urgency, which seems to be chronic. No fever/leukocytosis noted, and urine culture returned negative. Antibiotics were deferred    COPD  * Chronic and stable on Incruse daily, albuterol prn  * Appears compensated without evidence of  exacerbation     Prediabetes  Chronic and stable  - High SSI available, but blood sugars have been acceptable     Hypothyroidism  Chronic and stable on levothyroxine     Major recurrent depressive disorder with anxiety  Chronic and stable on escitalopram    Macrocytosis  - B12 level adequate.  Hemoglobin normal.     Diet: Snacks/Supplements Pediatric: Ensure Clear; With Meals  parenteral nutrition - ADULT compounded formula  Full Liquid Diet  parenteral nutrition - ADULT compounded formula    DVT Prophylaxis: Enoxaparin (Lovenox) SQ  Gottlieb Catheter: Not present  Code Status: Full Code         Disposition: Expected discharge as per CRS    The patient's care was discussed with the Patient.    Sarah Armando MD  Hospitalist Service  [facility]  Contact information available via [facility] Paging/Directory    ______________________________________________________________________    Interval History   Patient continues to feel improved.  However she does note increased frequency of urination.  States that the urge to urinate comes on suddenly and she has to rush to the bathroom.  She thinks this may be due to resuming her torsemide yesterday.  Denies any dysuria.  No fevers.  No diarrhea.    Data reviewed today: I reviewed all medications, new labs and imaging results over the last 24 hours. I personally reviewed the left hand XR image(s) showing no acute fractures or dislocations.    Physical Exam   Vital Signs: Temp: 98.3  F (36.8  C) Temp src: Oral BP: (!) 154/62 Pulse: 59   Resp: 16 SpO2: 98 % O2 Device: None (Room air)    Weight: 196 lbs 8 oz  Constitutional: Resting in bed, NAD  Respiratory: Breathing non-labored. Lungs CTAB  Cardiovascular: Heart RRR, no m/r/g. Minimal pedal edema  GI: Hypoactive BS, abd soft. Ostomy with green liquid output.  Skin/Integument: No rash   Neuro: Alert and appropriate, SANCHEZ  Psych: Calm and cooperative    Data   Recent Labs   Lab 05/14/22  0819 05/14/22  0617  05/14/22  0555 05/13/22  0731 05/11/22  1204 05/11/22  0701 05/10/22  0825 05/10/22  0714 05/09/22  1230 05/09/22  0833   WBC  --   --   --  6.5  --   --   --   --   --   --    HGB  --   --   --  11.0*  --   --   --   --   --   --    MCV  --   --   --  100  --   --   --   --   --   --    PLT  --   --   --  301  --   --   --  391  --   --    INR  --   --   --   --   --   --   --   --   --  0.99     --   --  141  --  139  --  137  --  138   POTASSIUM 3.6  --  5.3 3.6   < > 3.7  --  4.0  --  3.9   CHLORIDE 106  --   --  109  --  112*  --  111*  --  114*   CO2 27  --   --  29  --  22  --  20  --  16*   BUN 17  --   --  15  --  16  --  21  --  17   CR 0.80  --   --  0.70  --  0.65  --  0.74  --  0.58   ANIONGAP 6  --   --  3  --  5  --  6  --  8   GISELLE 8.3*  --   --  8.1*  --  8.7  --  9.1  --  9.1   * 114*  --  112*   < > 124*   < > 142*   < > 121*   ALBUMIN  --   --   --   --   --   --   --   --   --  2.1*   PROTTOTAL  --   --   --   --   --   --   --   --   --  5.6*   BILITOTAL  --   --   --   --   --   --   --   --   --  0.8   ALKPHOS  --   --   --   --   --   --   --   --   --  80   ALT  --   --   --   --   --   --   --   --   --  39   AST  --   --   --   --   --   --   --   --   --  39    < > = values in this interval not displayed.         No results found for this or any previous visit (from the past 24 hour(s)).    Medications     dextrose       parenteral nutrition - ADULT compounded formula       parenteral nutrition - ADULT compounded formula 75 mL/hr at 05/13/22 1955       amiodarone  200 mg Oral or NG Tube Daily     amLODIPine  5 mg Oral or NG Tube Daily     [Held by provider] atorvastatin  80 mg Oral At Bedtime     carvedilol  12.5 mg Oral BID w/meals     enoxaparin ANTICOAGULANT  40 mg Subcutaneous Q24H     escitalopram  10 mg Oral or NG Tube Daily     irbesartan  300 mg Oral Daily     levothyroxine  25 mcg Oral or NG Tube QAM AC     lipids  250 mL Intravenous Once per day on Wed Sat      ondansetron  4 mg Intravenous Once     sodium chloride (PF)  10-40 mL Intracatheter Q7 Days     sodium chloride (PF)  3 mL Intracatheter Q8H     sodium chloride   Intravenous Q4H     [Held by provider] spironolactone  25 mg Oral Daily     torsemide  20 mg Oral Daily     umeclidinium  1 puff Inhalation Daily

## 2022-05-14 NOTE — PROGRESS NOTES
Colon and Rectal Surgery Progress Note          Assessment and Plan:     POD #16    Slowly improving.    Full liquid diet today.  Otherwise continue current care      Michael Parker MD FACS FASCRS  Colorectal Surgeon       [facility]  [address], [address]  [place] [address]  T: [phone]  F: [phone]  Pager: [phone]  [email or web address]                 Interval History:     Tolerating clears.  Wants full liquids.  Stoma functioning              Physical Exam:   Vitals were reviewed  Patient Vitals for the past 24 hrs:   BP Temp Temp src Pulse Resp SpO2 Weight   05/14/22 0830 (!) 154/62 98.3  F (36.8  C) Oral 59 16 98 % --   05/14/22 0630 -- -- -- -- -- -- 89.1 kg (196 lb 8 oz)   05/14/22 0028 (!) 140/59 97.8  F (36.6  C) Oral 57 16 97 % --   05/13/22 1717 -- -- -- -- 18 -- --   05/13/22 1641 (!) 167/71 98.2  F (36.8  C) Oral 58 18 98 % --   05/13/22 1632 -- -- -- -- 16 -- --         Intake/Output Summary (Last 24 hours) at 5/14/2022 0858  Last data filed at 5/14/2022 0630  Gross per 24 hour   Intake 3175 ml   Output 4700 ml   Net -1525 ml       Head - Nomocephalic atraumatic  Sclera anicteric  Extremities warm and well perfused  Lungs - breathing non labored,   Abdomen - soft, wounds ok, stoma pink and productive  Rectal exam - deferred  Skin - no rash  Psych - affect appropriate  Neuro - no focal deficits             Data:   All laboratory and imaging data in the past 24 hours reviewed    CBC  Lab Results   Component Value Date    WBC 6.5 05/13/2022    WBC 9.9 05/07/2022    WBC 7.4 05/03/2022    HGB 11.0 (L) 05/13/2022    HGB 13.0 05/07/2022    HGB 14.8 05/03/2022    HCT 33.1 (L) 05/13/2022    HCT 39.4 05/07/2022    HCT 44.4 05/03/2022     05/13/2022     05/10/2022     05/07/2022     05/07/2022       BMP  Recent Labs   Lab Test 05/14/22  0819 05/14/22  0617 05/14/22  0555 05/13/22  0731     --   --  141   POTASSIUM 3.6  --  5.3 3.6   CHLORIDE  106  --   --  109   CO2 27  --   --  29   ANIONGAP 6  --   --  3   * 114*  --  112*   BUN 17  --   --  15   CR 0.80  --   --  0.70   GISELLE 8.3*  --   --  8.1*       Liver Function Studies -   Recent Labs   Lab Test 05/09/22 0833   PROTTOTAL 5.6*   ALBUMIN 2.1*   BILITOTAL 0.8   ALKPHOS 80   AST 39   ALT 39                      Medications:     Current Facility-Administered Medications Ordered in Epic   Medication Dose Route Frequency Last Rate Last Admin     acetaminophen (TYLENOL) tablet 650 mg  650 mg Oral or NG Tube Q4H PRN   650 mg at 05/14/22 0355     albuterol (PROVENTIL HFA/VENTOLIN HFA) inhaler  2 puff Inhalation 4x Daily PRN         amiodarone (PACERONE) tablet 200 mg  200 mg Oral or NG Tube Daily   200 mg at 05/14/22 0833     amLODIPine (NORVASC) tablet 5 mg  5 mg Oral or NG Tube Daily   5 mg at 05/14/22 0833     [Held by provider] atorvastatin (LIPITOR) tablet 80 mg  80 mg Oral At Bedtime   80 mg at 04/30/22 2137     carvedilol (COREG) tablet 12.5 mg  12.5 mg Oral BID w/meals   12.5 mg at 05/14/22 0833     dextrose 10% infusion   Intravenous Continuous PRN         glucose gel 15-30 g  15-30 g Oral Q15 Min PRN        Or     dextrose 50 % injection 25-50 mL  25-50 mL Intravenous Q15 Min PRN        Or     glucagon injection 1 mg  1 mg Subcutaneous Q15 Min PRN         enoxaparin ANTICOAGULANT (LOVENOX) injection 40 mg  40 mg Subcutaneous Q24H   40 mg at 05/13/22 1632     escitalopram (LEXAPRO) tablet 10 mg  10 mg Oral or NG Tube Daily   10 mg at 05/14/22 0833     hydrALAZINE (APRESOLINE) injection 10 mg  10 mg Intravenous Q4H PRN         irbesartan (AVAPRO) tablet 300 mg  300 mg Oral Daily   300 mg at 05/13/22 0908     levothyroxine (SYNTHROID/LEVOTHROID) tablet 25 mcg  25 mcg Oral or NG Tube QAM AC   25 mcg at 05/13/22 0908     lipids (INTRALIPID) 20 % infusion 250 mL  250 mL Intravenous Once per day on Wed Sat 20.8 mL/hr at 05/11/22 2043 250 mL at 05/11/22 2043     LORazepam (ATIVAN) injection 0.5 mg   0.5 mg Intravenous Q6H PRN   0.5 mg at 05/12/22 1038     metoclopramide (REGLAN) injection 5 mg  5 mg Intravenous Q8H PRN         morphine (PF) injection 2 mg  2 mg Intravenous Q3H PRN   2 mg at 05/12/22 1039     naloxone (NARCAN) injection 0.2 mg  0.2 mg Intravenous Q2 Min PRN        Or     naloxone (NARCAN) injection 0.4 mg  0.4 mg Intravenous Q2 Min PRN        Or     naloxone (NARCAN) injection 0.2 mg  0.2 mg Intramuscular Q2 Min PRN        Or     naloxone (NARCAN) injection 0.4 mg  0.4 mg Intramuscular Q2 Min PRN         ondansetron (ZOFRAN) injection 4 mg  4 mg Intravenous Once         ondansetron (ZOFRAN-ODT) ODT tab 4 mg  4 mg Oral Q6H PRN        Or     ondansetron (ZOFRAN) injection 4 mg  4 mg Intravenous Q6H PRN   4 mg at 05/12/22 0508     parenteral nutrition - ADULT compounded formula   CENTRAL LINE IV TPN CONTINUOUS 75 mL/hr at 05/13/22 1955 New Bag at 05/13/22 1955     phenol (CHLORASEPTIC) 1.4 % spray 1 mL  1 spray Mouth/Throat Q1H PRN   1 mL at 05/12/22 0844     prochlorperazine (COMPAZINE) injection 5 mg  5 mg Intravenous Q6H PRN   5 mg at 05/09/22 2328    Or     prochlorperazine (COMPAZINE) tablet 5 mg  5 mg Oral Q6H PRN        Or     prochlorperazine (COMPAZINE) suppository 12.5 mg  12.5 mg Rectal Q12H PRN         simethicone (MYLICON) chewable tablet 80 mg  80 mg Oral Q6H PRN   80 mg at 05/09/22 2133     sodium chloride (PF) 0.9% PF flush 10-20 mL  10-20 mL Intracatheter q1 min prn   10 mL at 05/09/22 1201     sodium chloride (PF) 0.9% PF flush 10-40 mL  10-40 mL Intracatheter Q7 Days   10 mL at 05/04/22 1420     sodium chloride (PF) 0.9% PF flush 10-40 mL  10-40 mL Intracatheter Q1H PRN         sodium chloride (PF) 0.9% PF flush 3 mL  3 mL Intracatheter Q8H   3 mL at 05/13/22 0909     sodium chloride (PF) 0.9% PF flush 3 mL  3 mL Intracatheter q1 min prn         sodium chloride 0.9% infusion   Intravenous Q4H 200 mL/hr at 05/13/22 1121 New Bag at 05/13/22 1121     [Held by provider]  spironolactone (ALDACTONE) tablet 25 mg  25 mg Oral Daily         torsemide (DEMADEX) tablet 20 mg  20 mg Oral Daily   20 mg at 05/14/22 0833     traMADol (ULTRAM) tablet 50 mg  50 mg Oral or NG Tube Q6H PRN   50 mg at 05/14/22 0439     umeclidinium (INCRUSE ELLIPTA) 62.5 MCG/INH inhaler 1 puff  1 puff Inhalation Daily   1 puff at 05/14/22 0834     No current [facility]-ordered outpatient medications on file.

## 2022-05-14 NOTE — PLAN OF CARE
Shift summary [id number]:     Patient A&Ox4, forgetful. Tylenol and tramadol given for pain. Voiding without issue. Ileostomy intact with liquid stool. Denies nausea/vomiting. VSS, on room air. Ambulating independently in room. Double lumen PICC RUE, infusing TPN.

## 2022-05-14 NOTE — PROGRESS NOTES
VSS ex HTN. A/O. Ind. abdo incisions CDI. Ileostomy with good output. Voiding fine. TPN cont. Tramadol given.

## 2022-05-15 LAB
GLUCOSE BLDC GLUCOMTR-MCNC: 114 MG/DL (ref 70–99)
GLUCOSE BLDC GLUCOMTR-MCNC: 122 MG/DL (ref 70–99)
GLUCOSE BLDC GLUCOMTR-MCNC: 95 MG/DL (ref 70–99)
MAGNESIUM SERPL-MCNC: 2.1 MG/DL (ref 1.6–2.3)
PHOSPHATE SERPL-MCNC: 3.4 MG/DL (ref 2.5–4.5)
POTASSIUM BLD-SCNC: 3.6 MMOL/L (ref 3.4–5.3)

## 2022-05-15 PROCEDURE — 36415 COLL VENOUS BLD VENIPUNCTURE: CPT | Performed by: HOSPITALIST

## 2022-05-15 PROCEDURE — 120N000001 HC R&B MED SURG/OB

## 2022-05-15 PROCEDURE — 258N000003 HC RX IP 258 OP 636: Performed by: PHYSICIAN ASSISTANT

## 2022-05-15 PROCEDURE — 83735 ASSAY OF MAGNESIUM: CPT | Performed by: HOSPITALIST

## 2022-05-15 PROCEDURE — 250N000013 HC RX MED GY IP 250 OP 250 PS 637: Performed by: NURSE PRACTITIONER

## 2022-05-15 PROCEDURE — 250N000011 HC RX IP 250 OP 636: Performed by: HOSPITALIST

## 2022-05-15 PROCEDURE — 250N000011 HC RX IP 250 OP 636: Performed by: COLON & RECTAL SURGERY

## 2022-05-15 PROCEDURE — 999N000190 HC STATISTIC VAT ROUNDS

## 2022-05-15 PROCEDURE — 99232 SBSQ HOSP IP/OBS MODERATE 35: CPT | Performed by: HOSPITALIST

## 2022-05-15 PROCEDURE — 84132 ASSAY OF SERUM POTASSIUM: CPT | Performed by: HOSPITALIST

## 2022-05-15 PROCEDURE — 250N000013 HC RX MED GY IP 250 OP 250 PS 637: Performed by: HOSPITALIST

## 2022-05-15 PROCEDURE — 250N000009 HC RX 250

## 2022-05-15 PROCEDURE — 84100 ASSAY OF PHOSPHORUS: CPT | Performed by: HOSPITALIST

## 2022-05-15 RX ORDER — OXYBUTYNIN CHLORIDE 5 MG/1
5 TABLET ORAL EVERY EVENING
Status: DISCONTINUED | OUTPATIENT
Start: 2022-05-15 | End: 2022-05-16

## 2022-05-15 RX ADMIN — ACETAMINOPHEN 650 MG: 325 TABLET ORAL at 03:43

## 2022-05-15 RX ADMIN — IRBESARTAN 300 MG: 150 TABLET ORAL at 20:15

## 2022-05-15 RX ADMIN — AMIODARONE HYDROCHLORIDE 200 MG: 200 TABLET ORAL at 09:05

## 2022-05-15 RX ADMIN — TRAMADOL HYDROCHLORIDE 50 MG: 50 TABLET, COATED ORAL at 22:56

## 2022-05-15 RX ADMIN — AMLODIPINE BESYLATE 5 MG: 5 TABLET ORAL at 09:05

## 2022-05-15 RX ADMIN — ENOXAPARIN SODIUM 40 MG: 40 INJECTION SUBCUTANEOUS at 16:59

## 2022-05-15 RX ADMIN — TRAMADOL HYDROCHLORIDE 50 MG: 50 TABLET, COATED ORAL at 09:05

## 2022-05-15 RX ADMIN — ACETAMINOPHEN 650 MG: 325 TABLET ORAL at 13:04

## 2022-05-15 RX ADMIN — LEVOTHYROXINE SODIUM 25 MCG: 25 TABLET ORAL at 09:03

## 2022-05-15 RX ADMIN — ESCITALOPRAM OXALATE 10 MG: 10 TABLET ORAL at 09:05

## 2022-05-15 RX ADMIN — TRAMADOL HYDROCHLORIDE 50 MG: 50 TABLET, COATED ORAL at 16:59

## 2022-05-15 RX ADMIN — SODIUM CHLORIDE, PRESERVATIVE FREE: 5 INJECTION INTRAVENOUS at 03:43

## 2022-05-15 RX ADMIN — MAGNESIUM SULFATE HEPTAHYDRATE: 500 INJECTION, SOLUTION INTRAMUSCULAR; INTRAVENOUS at 20:16

## 2022-05-15 RX ADMIN — OXYBUTYNIN CHLORIDE 5 MG: 5 TABLET ORAL at 20:16

## 2022-05-15 RX ADMIN — UMECLIDINIUM 1 PUFF: 62.5 AEROSOL, POWDER ORAL at 09:12

## 2022-05-15 RX ADMIN — CARVEDILOL 12.5 MG: 12.5 TABLET, FILM COATED ORAL at 18:24

## 2022-05-15 RX ADMIN — TORSEMIDE 20 MG: 20 TABLET ORAL at 09:05

## 2022-05-15 RX ADMIN — MORPHINE SULFATE 2 MG: 2 INJECTION, SOLUTION INTRAMUSCULAR; INTRAVENOUS at 03:42

## 2022-05-15 RX ADMIN — CARVEDILOL 12.5 MG: 12.5 TABLET, FILM COATED ORAL at 09:05

## 2022-05-15 ASSESSMENT — ACTIVITIES OF DAILY LIVING (ADL)
ADLS_ACUITY_SCORE: 21

## 2022-05-15 NOTE — PROGRESS NOTES
[facility]    Medicine Progress Note - Hospitalist Service       Date of Admission:  4/28/2022    Assessment & Plan   Janet Figueroa is a 71 year old female with complex PMHx including rectal cancer, CAD s/p PCI, paroxysmal a-fib on chronic anticoagulation with apixaban, renal artery stenosis, CKD, prediabetes, and COPD who was admitted to the Colorectal Surgery service on 4/28/2022 for low anterior resection with diverting loop ileostomy. Hospitalist service was consulted for post-op medical management. Hospital course has been complicated by prolonged post-op ileus      Rectal cancer s/p low anterior resection with diverting loop ileostomy on 4/28/2022  Post-operative ileus  * Underwent procedure under GETA by Dr. Connolly, minimal EBL noted  * Developed acute abdominal pain with nausea/vomiting on 5/2. NG tube placed.    * 5/6: NG tube removed with recurrence of nausea/vomiting  * 5/7: CT abd/pelvis showed small bowel obstruction  - No nausea/vomiting for ~24 hours now. Diet advanced to full liquids by CRS, 5/9.  However patient had worsening symptoms and hence made n.p.o. again and NG tube replaced.  - On TPN for supplemental nutrition per CRS  - Currently on enoxaparin for VTE ppx, plan to resume home apixaban per CRS once ileus resolves  -  HENNY drain has been removed.  - Patient is progressing well.  NG tube clamping trial passed and NG tube was removed on 5/12.  Currently on low fiber diet.    Non-anion gap metabolic acidosis, resolved  -  ?increased acid production vs GI losses.  Lactate normal.  - Nephrology consult ordered.  Recommend replace bicarb as needed however oral will be tough given GI issues.  Nephrology has started on D5 half NS with 75 meq bicarb.  Nephrology has signed off.  -IVF discontinued 5/13.    Left thumb pain and swelling, suspect sprain, Improving  Developed persistent left thumb pain and swelling during this hospitalization after trying to push herself up.  Left hand XR (5/6) showed no acute fractures or dislocations. Suspect sprain - pain and swelling has improved with supportive cares  - Continue supportive cares: RICE, pain meds prn     KAMRON on CKD, stage IIIa, Resolved  History of fibromuscular dysplasia of the right renal artery  * Creatinine increased up to 2.68 from baseline ~1 in the setting of ileus with nausea/vomiting and decreased PO intake; corrected with IV fluids and TPN  - Creatinine now stable ~0.6 range      Hypokalemia, Resolved  Due to GI losses  - Potassium was replaced per protocol     CAD, native heart, native vessels s/p PCI (RCA, 11/2019; LCx, 07/2008)  Chronic diastolic CHF  Essential hypertension  * PTA: carvedilol 12.5 mg BID, amlodipine 5 mg daily, irbesartan 300 mg daily, torsemide 20 mg BID, spironolactone 25 mg daily, atorvastatin 80 mg qhs  * Coronary angiogram (01/2020) showed no evidence for recurrent occlusive CAD  * TTE (11/2019) showed EF 55%, mild left atrial enlargement, mild MR, and trace TR  - Continues on PTA carvedilol  and amlodipine  -Resumed irbesartan and torsemide once daily on 5/13.  Holding spironolactone.  Patient notes increased frequency of urination, denies any dysuria symptoms.  Noted recent UA was negative for infection.  - Continue holding atorvastatin for now  - Hydralazine prn      Paroxysmal atrial fibrillation   * PTA: carvedilol 12.5 mg BID, apixaban 5 mg BID, amiodarone 200 Mg daily  - Continues on PTA carvedilol, PTA amiodarone  - Holding apixaban in setting of ileus    Pyuria  Hematuria, Resolved  Bladder spasms  * UA ordered on 4/30 for hematuria which has resolved (suspect traumatic Gottlieb catheter placement which has since been removed); also showed large LE and 29 wbcs. Patient reported urgency, which seems to be chronic. No fever/leukocytosis noted, and urine culture returned negative. Antibiotics were deferred  -Due to persistent issues with urgency and bladder spasms, started on oxybutynin every  evening as needed on 5/15.    COPD  * Chronic and stable on Incruse daily, albuterol prn  * Appears compensated without evidence of exacerbation     Prediabetes  Chronic and stable  - High SSI available, but blood sugars have been acceptable     Hypothyroidism  Chronic and stable on levothyroxine     Major recurrent depressive disorder with anxiety  Chronic and stable on escitalopram    Macrocytosis  - B12 level adequate.  Hemoglobin normal.     Diet: Snacks/Supplements Pediatric: Ensure Clear; With Meals  parenteral nutrition - ADULT compounded formula  parenteral nutrition - ADULT compounded formula  Low Fiber Diet    DVT Prophylaxis: Enoxaparin (Lovenox) SQ  Gottlieb Catheter: Not present  Code Status: Full Code         Disposition: Expected discharge as per CRS    The patient's care was discussed with the Patient.    Sarah Armando MD  Hospitalist Service  [facility]  Contact information available via [facility] Paging/Directory    ______________________________________________________________________    Interval History   Patient continues to feel improved.  Patient had some nausea yesterday which she believes is related to the oral morphine that she took.  She does not want to continue on morphine.  Tramadol does not make her nauseated.    She expresses significant concern with increased frequency of urination.  Complains of bladder spasms.  Has been on oxybutynin several years ago but did not note any relief.  However notes that current issues seem to have started only during this hospitalization and prior to surgery she did not have such increased frequency or bladder spasms.  Is agreeable to going on a trial of oxybutynin.  Recent urine negative for infection.    Data reviewed today: I reviewed all medications, new labs and imaging results over the last 24 hours. I personally reviewed the left hand XR image(s) showing no acute fractures or dislocations.    Physical Exam   Vital Signs:  Temp: 98.3  F (36.8  C) Temp src: Oral BP: (!) 161/65 Pulse: 57   Resp: 16 SpO2: 97 % O2 Device: None (Room air)    Weight: 194 lbs 14.19 oz  Constitutional: Resting in bed, NAD  Respiratory: Breathing non-labored. Lungs CTAB  Cardiovascular: Heart RRR, no m/r/g. Minimal pedal edema  GI: Hypoactive BS, abd soft. Ostomy with green liquid output.  Skin/Integument: No rash   Neuro: Alert and appropriate, SANCHEZ  Psych: Calm and cooperative    Data   Recent Labs   Lab 05/15/22  0624 05/15/22  0607 05/15/22  0029 05/14/22  2133 05/14/22  1713 05/14/22  0819 05/14/22  0617 05/14/22  0555 05/13/22  0731 05/11/22  1204 05/11/22  0701 05/10/22  0825 05/10/22  0714 05/09/22  1230 05/09/22  0833   WBC  --   --   --   --   --   --   --   --  6.5  --   --   --   --   --   --    HGB  --   --   --   --   --   --   --   --  11.0*  --   --   --   --   --   --    MCV  --   --   --   --   --   --   --   --  100  --   --   --   --   --   --    PLT  --   --   --   --   --   --   --   --  301  --   --   --  391  --   --    INR  --   --   --   --   --   --   --   --   --   --   --   --   --   --  0.99   NA  --   --   --   --   --  139  --   --  141  --  139  --  137  --  138   POTASSIUM  --  3.6  --   --   --  3.6  --  5.3 3.6   < > 3.7  --  4.0  --  3.9   CHLORIDE  --   --   --   --   --  106  --   --  109  --  112*  --  111*  --  114*   CO2  --   --   --   --   --  27  --   --  29  --  22  --  20  --  16*   BUN  --   --   --   --   --  17  --   --  15  --  16  --  21  --  17   CR  --   --   --   --   --  0.80  --   --  0.70  --  0.65  --  0.74  --  0.58   ANIONGAP  --   --   --   --   --  6  --   --  3  --  5  --  6  --  8   GISELLE  --   --   --   --   --  8.3*  --   --  8.1*  --  8.7  --  9.1  --  9.1   *  --  114* 97   < > 108*   < >  --  112*   < > 124*   < > 142*   < > 121*   ALBUMIN  --   --   --   --   --   --   --   --   --   --   --   --   --   --  2.1*   PROTTOTAL  --   --   --   --   --   --   --   --   --   --   --   --   --    --  5.6*   BILITOTAL  --   --   --   --   --   --   --   --   --   --   --   --   --   --  0.8   ALKPHOS  --   --   --   --   --   --   --   --   --   --   --   --   --   --  80   ALT  --   --   --   --   --   --   --   --   --   --   --   --   --   --  39   AST  --   --   --   --   --   --   --   --   --   --   --   --   --   --  39    < > = values in this interval not displayed.         No results found for this or any previous visit (from the past 24 hour(s)).    Medications     dextrose       parenteral nutrition - ADULT compounded formula       parenteral nutrition - ADULT compounded formula 75 mL/hr at 05/15/22 0853       amiodarone  200 mg Oral or NG Tube Daily     amLODIPine  5 mg Oral or NG Tube Daily     [Held by provider] atorvastatin  80 mg Oral At Bedtime     carvedilol  12.5 mg Oral BID w/meals     enoxaparin ANTICOAGULANT  40 mg Subcutaneous Q24H     escitalopram  10 mg Oral or NG Tube Daily     irbesartan  300 mg Oral Daily     levothyroxine  25 mcg Oral or NG Tube QAM AC     lipids  250 mL Intravenous Once per day on Wed Sat     ondansetron  4 mg Intravenous Once     sodium chloride (PF)  10-40 mL Intracatheter Q7 Days     sodium chloride (PF)  3 mL Intracatheter Q8H     sodium chloride   Intravenous Q4H     [Held by provider] spironolactone  25 mg Oral Daily     torsemide  20 mg Oral Daily     umeclidinium  1 puff Inhalation Daily

## 2022-05-15 NOTE — PLAN OF CARE
Pt is A&O x4 but forgetful at times,on RA, VSS,  reports moderate pain that is managed with PRN morphine, also pt reports nausea but declined zofran stating that medication makes it worse, pt reported  that nausea had subsided afterwards,on cont TPN at 75 ml/hr, voiding adequately in BR discharge pending.

## 2022-05-15 NOTE — PLAN OF CARE
Pt VSS ex elevated BP, on RA, A/O x4.  Pt c/o of mild sacral and knee pain, tramadol given x1.  Tolerating full liquid diet, BS in all quadrants, denies nausea.  Colostomy with moderate maroon/brown liquid output. Pt voiding adequately.  PICC with TPN and lipids infusing.

## 2022-05-15 NOTE — PROGRESS NOTES
Colon and Rectal Surgery Progress Note          Assessment and Plan:     POD # 17    Doing well.  Tolerating FLD.  Will advance to low fiber diet.  Cont tpn      Michael Parker MD FACS FASCRS  Colorectal Surgeon       [facility]  [address], [address]  [place] [address]  T: [phone]  F: [phone]  Pager: [phone]  [email or web address]                 Interval History:     Stoma functioning.  Tolerating full liquid diet.  Wants more food              Physical Exam:   Vitals were reviewed  Patient Vitals for the past 24 hrs:   BP Temp Temp src Pulse Resp SpO2 Weight   05/15/22 0757 (!) 161/65 98.3  F (36.8  C) Oral 57 16 97 % --   05/15/22 0600 -- -- -- -- -- -- 88.4 kg (194 lb 14.2 oz)   05/14/22 2336 (!) 145/59 97.9  F (36.6  C) Oral 61 16 98 % --   05/14/22 1605 (!) 156/65 98.6  F (37  C) Oral 60 16 97 % --         Intake/Output Summary (Last 24 hours) at 5/15/2022 0947  Last data filed at 5/15/2022 0627  Gross per 24 hour   Intake 2937.7 ml   Output 3400 ml   Net -462.3 ml       Head - Nomocephalic atraumatic  Sclera anicteric  Extremities warm and well perfused  Lungs - breathing non labored,   Abdomen - soft, stoma functioning, wounds OK  Rectal exam - deferred  Skin - no rash  Psych - affect appropriate  Neuro - no focal deficits             Data:   All laboratory and imaging data in the past 24 hours reviewed    CBC  Lab Results   Component Value Date    WBC 6.5 05/13/2022    WBC 9.9 05/07/2022    WBC 7.4 05/03/2022    HGB 11.0 (L) 05/13/2022    HGB 13.0 05/07/2022    HGB 14.8 05/03/2022    HCT 33.1 (L) 05/13/2022    HCT 39.4 05/07/2022    HCT 44.4 05/03/2022     05/13/2022     05/10/2022     05/07/2022     05/07/2022       BMP  Recent Labs   Lab Test 05/15/22  0624 05/15/22  0607 05/15/22  0029 05/14/22  1713 05/14/22  0819 05/14/22  0555 05/13/22  0731   NA  --   --   --   --  139  --  141   POTASSIUM  --  3.6  --   --  3.6   < > 3.6   CHLORIDE   --   --   --   --  106  --  109   CO2  --   --   --   --  27  --  29   ANIONGAP  --   --   --   --  6  --  3   *  --  114*   < > 108*   < > 112*   BUN  --   --   --   --  17  --  15   CR  --   --   --   --  0.80  --  0.70   GISELLE  --   --   --   --  8.3*  --  8.1*    < > = values in this interval not displayed.       Liver Function Studies -   Recent Labs   Lab Test 05/09/22  0833   PROTTOTAL 5.6*   ALBUMIN 2.1*   BILITOTAL 0.8   ALKPHOS 80   AST 39   ALT 39                    Medications:     Current Facility-Administered Medications Ordered in Epic   Medication Dose Route Frequency Last Rate Last Admin     acetaminophen (TYLENOL) tablet 650 mg  650 mg Oral or NG Tube Q4H PRN   650 mg at 05/15/22 0343     albuterol (PROVENTIL HFA/VENTOLIN HFA) inhaler  2 puff Inhalation 4x Daily PRN         amiodarone (PACERONE) tablet 200 mg  200 mg Oral or NG Tube Daily   200 mg at 05/15/22 0905     amLODIPine (NORVASC) tablet 5 mg  5 mg Oral or NG Tube Daily   5 mg at 05/15/22 0905     [Held by provider] atorvastatin (LIPITOR) tablet 80 mg  80 mg Oral At Bedtime   80 mg at 04/30/22 2137     carvedilol (COREG) tablet 12.5 mg  12.5 mg Oral BID w/meals   12.5 mg at 05/15/22 0905     dextrose 10% infusion   Intravenous Continuous PRN         glucose gel 15-30 g  15-30 g Oral Q15 Min PRN        Or     dextrose 50 % injection 25-50 mL  25-50 mL Intravenous Q15 Min PRN        Or     glucagon injection 1 mg  1 mg Subcutaneous Q15 Min PRN         enoxaparin ANTICOAGULANT (LOVENOX) injection 40 mg  40 mg Subcutaneous Q24H   40 mg at 05/14/22 1739     escitalopram (LEXAPRO) tablet 10 mg  10 mg Oral or NG Tube Daily   10 mg at 05/15/22 0905     hydrALAZINE (APRESOLINE) injection 10 mg  10 mg Intravenous Q4H PRN         irbesartan (AVAPRO) tablet 300 mg  300 mg Oral Daily   300 mg at 05/14/22 2130     levothyroxine (SYNTHROID/LEVOTHROID) tablet 25 mcg  25 mcg Oral or NG Tube QAM AC   25 mcg at 05/15/22 0903     lipids (INTRALIPID) 20 %  infusion 250 mL  250 mL Intravenous Once per day on Wed Sat 20.8 mL/hr at 05/14/22 2014 250 mL at 05/14/22 2014     LORazepam (ATIVAN) injection 0.5 mg  0.5 mg Intravenous Q6H PRN   0.5 mg at 05/12/22 1038     metoclopramide (REGLAN) injection 5 mg  5 mg Intravenous Q8H PRN         morphine (PF) injection 2 mg  2 mg Intravenous Q3H PRN   2 mg at 05/15/22 0342     naloxone (NARCAN) injection 0.2 mg  0.2 mg Intravenous Q2 Min PRN        Or     naloxone (NARCAN) injection 0.4 mg  0.4 mg Intravenous Q2 Min PRN        Or     naloxone (NARCAN) injection 0.2 mg  0.2 mg Intramuscular Q2 Min PRN        Or     naloxone (NARCAN) injection 0.4 mg  0.4 mg Intramuscular Q2 Min PRN         ondansetron (ZOFRAN) injection 4 mg  4 mg Intravenous Once         ondansetron (ZOFRAN-ODT) ODT tab 4 mg  4 mg Oral Q6H PRN        Or     ondansetron (ZOFRAN) injection 4 mg  4 mg Intravenous Q6H PRN   4 mg at 05/12/22 0508     parenteral nutrition - ADULT compounded formula   CENTRAL LINE IV TPN CONTINUOUS         parenteral nutrition - ADULT compounded formula   CENTRAL LINE IV TPN CONTINUOUS 75 mL/hr at 05/15/22 0853 Rate Verify at 05/15/22 0853     phenol (CHLORASEPTIC) 1.4 % spray 1 mL  1 spray Mouth/Throat Q1H PRN   1 mL at 05/12/22 0844     prochlorperazine (COMPAZINE) injection 5 mg  5 mg Intravenous Q6H PRN   5 mg at 05/09/22 2328    Or     prochlorperazine (COMPAZINE) tablet 5 mg  5 mg Oral Q6H PRN        Or     prochlorperazine (COMPAZINE) suppository 12.5 mg  12.5 mg Rectal Q12H PRN         simethicone (MYLICON) chewable tablet 80 mg  80 mg Oral Q6H PRN   80 mg at 05/09/22 2133     sodium chloride (PF) 0.9% PF flush 10-20 mL  10-20 mL Intracatheter q1 min prn   10 mL at 05/15/22 0913     sodium chloride (PF) 0.9% PF flush 10-40 mL  10-40 mL Intracatheter Q7 Days   10 mL at 05/04/22 1420     sodium chloride (PF) 0.9% PF flush 10-40 mL  10-40 mL Intracatheter Q1H PRN         sodium chloride (PF) 0.9% PF flush 3 mL  3 mL Intracatheter  Q8H   3 mL at 05/14/22 1618     sodium chloride (PF) 0.9% PF flush 3 mL  3 mL Intracatheter q1 min prn         sodium chloride 0.9% infusion   Intravenous Q4H 200 mL/hr at 05/15/22 0343 New Bag at 05/15/22 0343     [Held by provider] spironolactone (ALDACTONE) tablet 25 mg  25 mg Oral Daily         torsemide (DEMADEX) tablet 20 mg  20 mg Oral Daily   20 mg at 05/15/22 0905     traMADol (ULTRAM) tablet 50 mg  50 mg Oral or NG Tube Q6H PRN   50 mg at 05/15/22 0905     umeclidinium (INCRUSE ELLIPTA) 62.5 MCG/INH inhaler 1 puff  1 puff Inhalation Daily   1 puff at 05/15/22 0912     No current [facility]-ordered outpatient medications on file.

## 2022-05-16 LAB
ALBUMIN SERPL-MCNC: 2.4 G/DL (ref 3.4–5)
ALP SERPL-CCNC: 90 U/L (ref 40–150)
ALT SERPL W P-5'-P-CCNC: 42 U/L (ref 0–50)
ANION GAP SERPL CALCULATED.3IONS-SCNC: 7 MMOL/L (ref 3–14)
AST SERPL W P-5'-P-CCNC: 29 U/L (ref 0–45)
BILIRUB SERPL-MCNC: 0.5 MG/DL (ref 0.2–1.3)
BUN SERPL-MCNC: 29 MG/DL (ref 7–30)
CALCIUM SERPL-MCNC: 8.1 MG/DL (ref 8.5–10.1)
CHLORIDE BLD-SCNC: 110 MMOL/L (ref 94–109)
CO2 SERPL-SCNC: 21 MMOL/L (ref 20–32)
CREAT SERPL-MCNC: 0.91 MG/DL (ref 0.52–1.04)
GFR SERPL CREATININE-BSD FRML MDRD: 66 ML/MIN/1.73M2
GLUCOSE BLD-MCNC: 101 MG/DL (ref 70–99)
GLUCOSE BLDC GLUCOMTR-MCNC: 108 MG/DL (ref 70–99)
GLUCOSE BLDC GLUCOMTR-MCNC: 113 MG/DL (ref 70–99)
GLUCOSE BLDC GLUCOMTR-MCNC: 117 MG/DL (ref 70–99)
INR PPP: 1.01 (ref 0.85–1.15)
MAGNESIUM SERPL-MCNC: 2.4 MG/DL (ref 1.6–2.3)
PHOSPHATE SERPL-MCNC: 3.5 MG/DL (ref 2.5–4.5)
PLATELET # BLD AUTO: 395 10E3/UL (ref 150–450)
POTASSIUM BLD-SCNC: 4.1 MMOL/L (ref 3.4–5.3)
PROT SERPL-MCNC: 5.8 G/DL (ref 6.8–8.8)
SODIUM SERPL-SCNC: 138 MMOL/L (ref 133–144)
TRIGL SERPL-MCNC: 179 MG/DL

## 2022-05-16 PROCEDURE — 250N000011 HC RX IP 250 OP 636: Performed by: COLON & RECTAL SURGERY

## 2022-05-16 PROCEDURE — 84478 ASSAY OF TRIGLYCERIDES: CPT | Performed by: PHYSICIAN ASSISTANT

## 2022-05-16 PROCEDURE — 83735 ASSAY OF MAGNESIUM: CPT | Performed by: PHYSICIAN ASSISTANT

## 2022-05-16 PROCEDURE — 80053 COMPREHEN METABOLIC PANEL: CPT | Performed by: PHYSICIAN ASSISTANT

## 2022-05-16 PROCEDURE — 84100 ASSAY OF PHOSPHORUS: CPT | Performed by: PHYSICIAN ASSISTANT

## 2022-05-16 PROCEDURE — G0463 HOSPITAL OUTPT CLINIC VISIT: HCPCS

## 2022-05-16 PROCEDURE — 120N000001 HC R&B MED SURG/OB

## 2022-05-16 PROCEDURE — 250N000013 HC RX MED GY IP 250 OP 250 PS 637: Performed by: HOSPITALIST

## 2022-05-16 PROCEDURE — 250N000013 HC RX MED GY IP 250 OP 250 PS 637: Performed by: NURSE PRACTITIONER

## 2022-05-16 PROCEDURE — 85049 AUTOMATED PLATELET COUNT: CPT | Performed by: COLON & RECTAL SURGERY

## 2022-05-16 PROCEDURE — 99232 SBSQ HOSP IP/OBS MODERATE 35: CPT | Performed by: HOSPITALIST

## 2022-05-16 PROCEDURE — 36415 COLL VENOUS BLD VENIPUNCTURE: CPT | Performed by: PHYSICIAN ASSISTANT

## 2022-05-16 PROCEDURE — 85610 PROTHROMBIN TIME: CPT | Performed by: PHYSICIAN ASSISTANT

## 2022-05-16 PROCEDURE — 999N000190 HC STATISTIC VAT ROUNDS

## 2022-05-16 RX ORDER — SPIRONOLACTONE 25 MG
12.5 TABLET ORAL DAILY
Status: DISCONTINUED | OUTPATIENT
Start: 2022-05-16 | End: 2022-05-17 | Stop reason: HOSPADM

## 2022-05-16 RX ORDER — OXYBUTYNIN CHLORIDE 5 MG/1
5 TABLET ORAL 2 TIMES DAILY
Status: DISCONTINUED | OUTPATIENT
Start: 2022-05-16 | End: 2022-05-17 | Stop reason: HOSPADM

## 2022-05-16 RX ORDER — OXYBUTYNIN CHLORIDE 5 MG/1
5 TABLET ORAL ONCE
Status: COMPLETED | OUTPATIENT
Start: 2022-05-16 | End: 2022-05-16

## 2022-05-16 RX ADMIN — UMECLIDINIUM 1 PUFF: 62.5 AEROSOL, POWDER ORAL at 10:39

## 2022-05-16 RX ADMIN — LEVOTHYROXINE SODIUM 25 MCG: 25 TABLET ORAL at 10:34

## 2022-05-16 RX ADMIN — ATORVASTATIN CALCIUM 80 MG: 40 TABLET, FILM COATED ORAL at 21:58

## 2022-05-16 RX ADMIN — TRAMADOL HYDROCHLORIDE 50 MG: 50 TABLET, COATED ORAL at 10:53

## 2022-05-16 RX ADMIN — SPIRONOLACTONE 12.5 MG: 25 TABLET ORAL at 11:50

## 2022-05-16 RX ADMIN — TRAMADOL HYDROCHLORIDE 50 MG: 50 TABLET, COATED ORAL at 21:59

## 2022-05-16 RX ADMIN — AMLODIPINE BESYLATE 5 MG: 5 TABLET ORAL at 10:33

## 2022-05-16 RX ADMIN — CARVEDILOL 12.5 MG: 12.5 TABLET, FILM COATED ORAL at 10:34

## 2022-05-16 RX ADMIN — OXYBUTYNIN CHLORIDE 5 MG: 5 TABLET ORAL at 21:58

## 2022-05-16 RX ADMIN — ACETAMINOPHEN 650 MG: 325 TABLET ORAL at 03:33

## 2022-05-16 RX ADMIN — TORSEMIDE 20 MG: 20 TABLET ORAL at 10:33

## 2022-05-16 RX ADMIN — ENOXAPARIN SODIUM 40 MG: 40 INJECTION SUBCUTANEOUS at 16:20

## 2022-05-16 RX ADMIN — OXYBUTYNIN CHLORIDE 5 MG: 5 TABLET ORAL at 14:21

## 2022-05-16 RX ADMIN — AMIODARONE HYDROCHLORIDE 200 MG: 200 TABLET ORAL at 10:34

## 2022-05-16 RX ADMIN — ACETAMINOPHEN 650 MG: 325 TABLET ORAL at 16:20

## 2022-05-16 RX ADMIN — IRBESARTAN 300 MG: 150 TABLET ORAL at 21:58

## 2022-05-16 RX ADMIN — ESCITALOPRAM OXALATE 10 MG: 10 TABLET ORAL at 10:34

## 2022-05-16 RX ADMIN — CARVEDILOL 12.5 MG: 12.5 TABLET, FILM COATED ORAL at 19:01

## 2022-05-16 ASSESSMENT — ACTIVITIES OF DAILY LIVING (ADL)
ADLS_ACUITY_SCORE: 21
ADLS_ACUITY_SCORE: 25
ADLS_ACUITY_SCORE: 24
ADLS_ACUITY_SCORE: 24
ADLS_ACUITY_SCORE: 25
ADLS_ACUITY_SCORE: 24
ADLS_ACUITY_SCORE: 25
ADLS_ACUITY_SCORE: 25
ADLS_ACUITY_SCORE: 21
ADLS_ACUITY_SCORE: 25
ADLS_ACUITY_SCORE: 21
ADLS_ACUITY_SCORE: 21

## 2022-05-16 NOTE — PLAN OF CARE
Problem: Plan of Care - These are the overarching goals to be used throughout the patient stay.    Goal: Plan of Care Review/Shift Note  Description: The Plan of Care Review/Shift note should be completed every shift.  The Outcome Evaluation is a brief statement about your assessment that the patient is improving, declining, or no change.  This information will be displayed automatically on your shift note.  Outcome: Ongoing, Progressing     Problem: Infection (Surgery Nonspecified)  Goal: Absence of Infection Signs and Symptoms  Outcome: Ongoing, Progressing   Goal Outcome Evaluation:      Planning for discharge tomorrow, weaning TPN off tonight. Up ambulating in the halls, some back pain. Ileostomy changed today per patient as well.

## 2022-05-16 NOTE — PROGRESS NOTES
[facility]    Medicine Progress Note - Hospitalist Service       Date of Admission:  4/28/2022    Assessment & Plan   Janet Figueroa is a 71 year old female with complex PMHx including rectal cancer, CAD s/p PCI, paroxysmal a-fib on chronic anticoagulation with apixaban, renal artery stenosis, CKD, prediabetes, and COPD who was admitted to the Colorectal Surgery service on 4/28/2022 for low anterior resection with diverting loop ileostomy. Hospitalist service was consulted for post-op medical management. Hospital course has been complicated by prolonged post-op ileus      Rectal cancer s/p low anterior resection with diverting loop ileostomy on 4/28/2022  Post-operative ileus  * Underwent procedure under GETA by Dr. Connolly, minimal EBL noted  * Developed acute abdominal pain with nausea/vomiting on 5/2. NG tube placed.    * 5/6: NG tube removed with recurrence of nausea/vomiting  * 5/7: CT abd/pelvis showed small bowel obstruction  - No nausea/vomiting for ~24 hours now. Diet advanced to full liquids by CRS, 5/9.  However patient had worsening symptoms and hence made n.p.o. again and NG tube replaced.  - On TPN for supplemental nutrition per CRS  - Currently on enoxaparin for VTE ppx, plan to resume home apixaban per CRS once ileus resolves  -  HENNY drain has been removed.  - Patient is progressing well.  NG tube clamping trial passed and NG tube was removed on 5/12.  Currently on low fiber diet.  -5/16: Progressing well.  TPN to be weaned off today.    Non-anion gap metabolic acidosis, resolved  -  ?increased acid production vs GI losses.  Lactate normal.  - Nephrology consult ordered.  Recommend replace bicarb as needed however oral will be tough given GI issues.  Nephrology has started on D5 half NS with 75 meq bicarb.  Nephrology has signed off.  -IVF discontinued 5/13.    Left thumb pain and swelling, suspect sprain, Improving  Developed persistent left thumb pain and swelling during  this hospitalization after trying to push herself up. Left hand XR (5/6) showed no acute fractures or dislocations. Suspect sprain - pain and swelling has improved with supportive cares  - Continue supportive cares: RICE, pain meds prn     KAMRON on CKD, stage IIIa, Resolved  History of fibromuscular dysplasia of the right renal artery  * Creatinine increased up to 2.68 from baseline ~1 in the setting of ileus with nausea/vomiting and decreased PO intake; corrected with IV fluids and TPN  - Creatinine now stable ~0.6 range      Hypokalemia, Resolved  Due to GI losses  - Potassium was replaced per protocol     CAD, native heart, native vessels s/p PCI (RCA, 11/2019; LCx, 07/2008)  Chronic diastolic CHF  Essential hypertension  * PTA: carvedilol 12.5 mg BID, amlodipine 5 mg daily, irbesartan 300 mg daily, torsemide 20 mg BID, spironolactone 25 mg daily, atorvastatin 80 mg qhs  * Coronary angiogram (01/2020) showed no evidence for recurrent occlusive CAD  * TTE (11/2019) showed EF 55%, mild left atrial enlargement, mild MR, and trace TR  - Continues on PTA carvedilol  and amlodipine  -Resumed irbesartan and torsemide once daily on 5/13.  Holding spironolactone.  Patient notes increased frequency of urination, denies any dysuria symptoms.  Noted recent UA was negative for infection.  - Continue holding atorvastatin for now  - Hydralazine prn  -Adding back spironolactone 12.5 Mg daily on 5/16.  Also resumed statin as oral intake has improved.     Paroxysmal atrial fibrillation   * PTA: carvedilol 12.5 mg BID, apixaban 5 mg BID, amiodarone 200 Mg daily  - Continues on PTA carvedilol, PTA amiodarone  - Holding apixaban in setting of ileus, will resume apixaban on discharge    Pyuria  Hematuria, Resolved  Bladder spasms  * UA ordered on 4/30 for hematuria which has resolved (suspect traumatic Gottlieb catheter placement which has since been removed); also showed large LE and 29 wbcs. Patient reported urgency, which seems to be  chronic. No fever/leukocytosis noted, and urine culture returned negative. Antibiotics were deferred  -Due to persistent issues with urgency and bladder spasms, started on oxybutynin every evening as needed on 5/15.  Increased to twice daily on 5/16.    COPD  * Chronic and stable on Incruse daily, albuterol prn  * Appears compensated without evidence of exacerbation     Prediabetes  Chronic and stable  - High SSI available, but blood sugars have been acceptable     Hypothyroidism  Chronic and stable on levothyroxine     Major recurrent depressive disorder with anxiety  Chronic and stable on escitalopram    Macrocytosis  - B12 level adequate.  Hemoglobin normal.     Diet: parenteral nutrition - ADULT compounded formula  Low Fiber Diet  Snacks/Supplements Adult: Ensure Enlive; Between Meals    DVT Prophylaxis: Enoxaparin (Lovenox) SQ  Gottlieb Catheter: Not present  Code Status: Full Code         Disposition: Expected discharge as per CRS, likely home tomorrow.    The patient's care was discussed with the Patient.    Sarah Armando MD  Hospitalist Service  [facility]  Contact information available via [facility] Paging/Directory    ______________________________________________________________________    Interval History   Patient continues to feel improved.  Having ongoing issues with urgency and urinary frequency.  Will increase dose of oxybutynin.  Also had concerns regarding her diuretic dosing and ultimately after long discussion, we agreed to continue on once daily torsemide and resume spironolactone at low-dose.  Is hoping to go home tomorrow.    Data reviewed today: I reviewed all medications, new labs and imaging results over the last 24 hours. I personally reviewed the left hand XR image(s) showing no acute fractures or dislocations.    Physical Exam   Vital Signs: Temp: 97.7  F (36.5  C) Temp src: Oral BP: (!) 146/57 Pulse: 62   Resp: 18 SpO2: 97 % O2 Device: None (Room air)    Weight:  195 lbs 15.82 oz  Constitutional: Resting in bed, NAD  Respiratory: Breathing non-labored. Lungs CTAB  Cardiovascular: Heart RRR, no m/r/g. Minimal pedal edema  GI: Hypoactive BS, abd soft. Ostomy with green liquid output.  Skin/Integument: No rash   Neuro: Alert and appropriate, SANCHEZ  Psych: Calm and cooperative    Data   Recent Labs   Lab 05/16/22  0646 05/16/22  0610 05/16/22  0023 05/15/22  0624 05/15/22  0607 05/14/22  1713 05/14/22  0819 05/14/22  0555 05/13/22  0731 05/10/22  0825 05/10/22  0714   WBC  --   --   --   --   --   --   --   --  6.5  --   --    HGB  --   --   --   --   --   --   --   --  11.0*  --   --    MCV  --   --   --   --   --   --   --   --  100  --   --      --   --   --   --   --   --   --  301  --  391   INR 1.01  --   --   --   --   --   --   --   --   --   --      --   --   --   --   --  139  --  141   < > 137   POTASSIUM 4.1  --   --   --  3.6  --  3.6   < > 3.6   < > 4.0   CHLORIDE 110*  --   --   --   --   --  106  --  109   < > 111*   CO2 21  --   --   --   --   --  27  --  29   < > 20   BUN 29  --   --   --   --   --  17  --  15   < > 21   CR 0.91  --   --   --   --   --  0.80  --  0.70   < > 0.74   ANIONGAP 7  --   --   --   --   --  6  --  3   < > 6   GISELLE 8.1*  --   --   --   --   --  8.3*  --  8.1*   < > 9.1   * 113* 117*   < >  --    < > 108*   < > 112*   < > 142*   ALBUMIN 2.4*  --   --   --   --   --   --   --   --   --   --    PROTTOTAL 5.8*  --   --   --   --   --   --   --   --   --   --    BILITOTAL 0.5  --   --   --   --   --   --   --   --   --   --    ALKPHOS 90  --   --   --   --   --   --   --   --   --   --    ALT 42  --   --   --   --   --   --   --   --   --   --    AST 29  --   --   --   --   --   --   --   --   --   --     < > = values in this interval not displayed.         No results found for this or any previous visit (from the past 24 hour(s)).    Medications     dextrose       parenteral nutrition - ADULT compounded formula 75 mL/hr at  05/16/22 1037       amiodarone  200 mg Oral or NG Tube Daily     amLODIPine  5 mg Oral or NG Tube Daily     [Held by provider] atorvastatin  80 mg Oral At Bedtime     carvedilol  12.5 mg Oral BID w/meals     enoxaparin ANTICOAGULANT  40 mg Subcutaneous Q24H     escitalopram  10 mg Oral or NG Tube Daily     irbesartan  300 mg Oral Daily     levothyroxine  25 mcg Oral or NG Tube QAM AC     lipids  250 mL Intravenous Once per day on Wed Sat     ondansetron  4 mg Intravenous Once     oxybutynin  5 mg Oral QPM     sodium chloride (PF)  10-40 mL Intracatheter Q7 Days     sodium chloride (PF)  3 mL Intracatheter Q8H     sodium chloride   Intravenous Q4H     spironolactone  12.5 mg Oral Daily     [Held by provider] spironolactone  25 mg Oral Daily     torsemide  20 mg Oral Daily     umeclidinium  1 puff Inhalation Daily

## 2022-05-16 NOTE — PLAN OF CARE
Pt VSS ex elevated BP, on RA, A/O x4.  Pt c/o of sacrum and knee pain, managing with tramadol and tylenol.  Tolerating full low fiber diet, good appetite. BS in all quadrants, denies nausea.  Colostomy with moderate brown liquid output. Pt voiding adequately, but complains of frequency.  PICC with TPN infusing. Discharge pending.

## 2022-05-16 NOTE — PROGRESS NOTES
New Ileostomy     Surgery date:  4/28/22  Surgeon:  Dr. Lay Connolly  Procedure:  Low anterior resection with diverting loop ileostomy  Related diagnosis:  Low rectal cancer    General:   1. Change appliance 2-3x/week and as needed for any leakage.    2. Empty pouch when 1/3 full. Initially wake to check need for pouch emtpying at least once during the night.    3. Carry an extra pouching system with you at all times (can prepare the barrier ahead of time - cut out the opening and apply ring).  4. Diet: Eat 6 small meals/day.  Drink 8oz/fluid every hour during waking hours. Chew all food well!    5. Diet: Eat pasta, rice, potatoes, hot/dry cereal, toast, breads, pretzels, chips, crackers, yogurt, meat, fish, eggs, cheese, peanut butter, bananas to thicken the stool. Eat 4-6 servings of protein/day.  6. Avoid eating: Seeds, nuts, peels, raw vegetables, chinese vegetables, casings on meats, grape/prune juice, grapefruit, oranges, pineapple, mushrooms.   7. NO laxatives. NO timed-released medications.   8. May shower with appliance on or off. Blow dry (on cool setting) cloth backing and tape following bathing.   9. Change your appliance in the morning before breakfast (less likely to have stool coming out at this time).  10. Walk frequently but no heavy lifting.  11. Can use an odor eliminator (Ex: Febreeze) in the room prior to emptying or changing appliance.  12. Initially monitor your output to avoid dehydration.  Call Physician if your output exceeds 1500cc/24hrs.    Supplies:  (Ask your home care nurse to help you order supplies.  Your product plan may change over time, as your stoma and/or your abdomen changes).    -Pt has supplies for discharge   -[facility] to reinforce appliance change with client.     Geena New Image 2-pc (20 pouch changes/month)   1.  Barrier: New Image Flextend convex  cut-to-fit with tape    -   #31656 (5/box = 4box/month)   2.  Pouch: high output   -                                             "                  #78300  (10/box = 2 box/month)  and/or  3.  Pouch: drainable lock n roll    -                                                #15712 (clear pouch) or  #87319 (beige pouch)  (10/box = 2 box/month)  4.  Ring:  [place] 2\" CeraRing   -                                                #8805  (10/box = 2 box/month)  5.  3M Cavilon no-sting skin barrier (optional if needed)   -         #3344  (50/box = 1 box as needed)  6.  Stoma powder (if needed)   -                                                  #7906  (1 bottle as needed)  7.  Ostomy belt (optional)   -                                                       #7299  (large) -  1/month  8.  Odor eliminator drops (optional)   -   #7717  (8oz bottle) or #7715 (1oz bottle, box of 12)  9. Belly Band @ [employer]     Pouching procedure:  1.  Cut out opening in barrier following pattern.   Just inside ! 1/2\" line  2.  Remove plastic backing.  3.  Open ring, stretch and apply around the cut opening on sticky side of barrier.  Press into place.  Note:  can 'build up' the ring to fill in any divots in peristomal skin.     4.  Fold back edge of paper that covers the tape to create a \"tab.\"  This assists with paper removal later on.  5.  May attach pouch to barrier at this time.  Set prepared pouch aside.  6.  Remove old pouch from around stoma.  Discard.   7.  Cleanse around stoma with water only.  Dry well.     8.  Apply pouch:  Ensure skin completely dry.  Pull up on abdomen to create flat pouching surface.  Center stoma in barrier opening and press barrier firmly to skin.  9.  Pull on \"tabs\" to remove paper that covers the tape.  Press tape to skin.  Ok if there are wrinkles in the tape.    10.  Attach pouch to barrier, if have not already done.  Ensure pouch is closed.   11.  Hold warm hand over stoma/barrier for a few minutes, to help pouch form a good secure bond with the skin.      Online ostomy resources: RadPad website, has good videos - " https://[email or web address].com/en/ostomycare/educationaltools   - See also [email or web address]; [email or web address].org  - Smart phone janet: Ostobuddy      Follow-up as needed (after home care is done):     Dionne De Guzman Amanda, Kaitlin - CWOCNs (ostomy nurses)  Clinic room is on 1st floor in [facility] - check in at [place] Desk in [facility] phone # [phone] (Mon - Fri) - call for any questions or concerns

## 2022-05-16 NOTE — PROGRESS NOTES
"Colon & Rectal Surgery Progress Note             Interval History:   Postop Day #:18   No nausea, eating food. Up walking. Emptying stoma independently.  UOP 1.8 +  Stool 1L                Medications:   I have reviewed this patient's current medications               Physical Exam:   Blood pressure 136/50, pulse 60, temperature 97.7  F (36.5  C), temperature source Oral, resp. rate 16, height 1.6 m (5' 3\"), weight 88.4 kg (194 lb 14.2 oz), SpO2 98 %.    Intake/Output Summary (Last 24 hours) at 5/16/2022 0655  Last data filed at 5/16/2022 0400  Gross per 24 hour   Intake 3228 ml   Output 2830 ml   Net 398 ml     GEN:  alert  ABD:  Soft, stoma pink, bruising resolving.         Data:        Lab Results   Component Value Date     05/14/2022    Lab Results   Component Value Date    CHLORIDE 106 05/14/2022    Lab Results   Component Value Date    BUN 17 05/14/2022      Lab Results   Component Value Date    POTASSIUM 3.6 05/15/2022    Lab Results   Component Value Date    CO2 27 05/14/2022    Lab Results   Component Value Date    CR 0.80 05/14/2022    CR 0.70 05/13/2022        Lab Results   Component Value Date    HGB 11.0 (L) 05/13/2022    HGB 13.0 05/07/2022     Lab Results   Component Value Date     05/13/2022     05/10/2022     Lab Results   Component Value Date    WBC 6.5 05/13/2022    WBC 9.9 05/07/2022            Assessment and Plan:   Wean tpn  Encourage ambulation  Likely discharge tomorrow.     Lay Connolly MD  [facility]  Office Phone # [phone]    "

## 2022-05-16 NOTE — PROGRESS NOTES
CLINICAL NUTRITION SERVICES - REASSESSMENT NOTE      Recommendations Ordered by Registered Dietitian (RD):   Taper TPN down to 40 mL/hr at 7 pm and discontinue TPN at 8 pm    Cancel Ensure Clear with meals (pt request)  Add vanilla Ensure Enlive at 2 pm   Malnutrition: (5/4)  % Weight Loss:  None noted  % Intake:  </= 50% for >/= 5 days (severe malnutrition)  Subcutaneous Fat Loss:  None observed  Muscle Loss:  None observed  Fluid Retention:  None noted     Malnutrition Diagnosis: Patient does not meet two of the above criteria necessary for diagnosing malnutrition       EVALUATION OF PROGRESS TOWARD GOALS   Diet:  Low Fiber + Ensure Clear with meals    Nutrition Support:  TPN continues to run as below:    Nutrition Support Parenteral:  Type of Access: PICC  Parenteral Frequency:  Continuous  Parenteral Regimen: Run at 75 mL/hr, 90 g AA/day, 270 g Dextrose/day + Lipid 250 mL 20% 2x per week   Total Parenteral Provisions: 1421 kcal (23 kcal/kg and 93% needs), 90 g protein (1.5 g/kg), 270 g CHO (GIR 3), 10% fat.    The D5 containing IVF was discontinued 5/13.    Intake/Tolerance (Oral):  CL diet restarted 5/13, then diet advanced to FL on 5/14, and finally Low Fiber on 5/15.  Yesterday she took 1535 mL oral. Stool 950 mL.  Per nursing flow sheet pt consumed 75% of all 3 meals yesterday and reviewed [facility] record to determine calorie/protein intake = 1635 kcals, 80 gm pro.  She doesn't care for the Ensure Clear supplement and would rather have our house supplement in vanilla flavor once daily in the pm.      Intake/Tolerance (TPN):     (H)  Other labs and BGs acceptable.  I/O: 4043/4830.  Wt 88.9 kg (~ stable from admission). Pt with 1+ trace dependent edema, on Torsemide.    ASSESSED NUTRITION NEEDS:  Dosing Weight 61.3 kg (adjusted for overweight  Estimated Energy Needs: 0538-9461 kcals (25-30 Kcal/Kg)  Justification: obese  Estimated Protein Needs: 75-90 grams protein (1.2-1.5 g  pro/Kg)  Justification: post-op and hypercatabolism with acute illness    NEW FINDINGS:   Was asked to wean TPN per surgery recommendations.  Pt will likely be discharged tomorrow.    Previous Goals (5/12):   TPN/Lipid + D5 IVF will continue to meet % needs while intake minimal    Evaluation: Met, even with D5 IVF off, but now patient taking adequate oral intake.    Previous Nutrition Diagnosis (5/12):   No nutrition diagnosis identified at this time  Evaluation: Declining      CURRENT NUTRITION DIAGNOSIS  Excessive protein-energy intake related to full TPN running and pt consumed 75% meals TID yesterday as evidenced by combination TPN + oral met 165% energy and 188% protein needs.    INTERVENTIONS  Recommendations / Nutrition Prescription  Tonight, taper TPN down to 40 mL/hr at 7 pm and discontinue TPN at 8 pm    Cancel Ensure Clear with meals  Add vanilla Ensure Enlive at 2 pm    Implementation  PN Schedule - Entered orders to wean off TPN tonight as above  Medical Food Supplement - Modified in Epic as above  Collaboration and Referral of Nutrition care - Pt was discussed with Pharmacist this morning    Goals  Patient will continue to consume > 75% meals TID and supplement.    MONITORING AND EVALUATION:  Progress towards goals will be monitored and evaluated per protocol and Practice Guidelines    Joanna Oliveira, RD, LD, CNSC

## 2022-05-16 NOTE — PLAN OF CARE
Pt is A&O x4 but forgetful at times,on RA, VSS, tolerating low fiber diet, reported left shoulder pain that is managed with PRN tylenol,on cont TPN infusing to R PICC at 75 ml/hr, voiding adequately in beside commode, ileostomy bag is intact with brown liquid stool, denies nausea, discharge pending.

## 2022-05-16 NOTE — PROGRESS NOTES
"[facility] Nurse Inpatient Ostomy Assessment     Assessment of new loop Ileostomy     Surgery date:  4/28/22  Surgeon:  Dr. Lay Connolly  Procedure:  Low anterior resection with diverting loop ileostomy  Related diagnosis:  Low rectal cancer    [facility] Assessment & Physical Exam:        Current status: Pt up in chair. Pt emptying independently. Some ongoing tracking/memory issues, but redirects and remembers steps with cues.       Date of last photo: 5/9/22        Stoma location: RUQ    Stoma size: 1 1 /2\"     Stoma appearance: pink-red, round out, Unable to determine functional lumen    Mucocutaneous junction:  Intact with sutures     Peristomal skin: intact, no erythema, rash noted    Abdominal assessment: distended, semi-soft. With movement in chair abdominal contours change and sink in     Surgical site(s): open to air    NG still in place? No    Output: brown, green and liquid     Pain: denies    Is patient still on a PCA? No      Current pouching system: [place] 2 piece flat with ring  with high output pouch. Leakage noted under barrier    Pouch last changed:  4/29, 5/3, 5/6, 5/9; 5-16    Reason for pouch change today: leakage, odor     Learning and comprehension: full teaching 5/16,   Pt emptying pouch independently. Pt changed appliance with verbal cues and hands on assist. Reviewed diet and hydration, activity, output. AVS updated with order numbers for new barrier.     Psychosocial: Patient is pleasant and interactive asking appropriate quesitons, but remains a little foggy       WO Plan:         Pouching product plan: Geena 2 piece 57mm convex with ring and high output pouch    Frequency of pouch changes: 2-3x weekly      Pt support system on discharge: HHC @ discharge         WO follow-up plan: daily M-F      Objective Data:       Patient history according to medical record: Janet Figueroa is a 71 year old female admitted on 4/28/2022. She has a past medical history " of hypertension, mild COPD, CAD s/p PCI, dyslipidemia, paroxysmal A. Fib on eliquis, CKD, stage IIIb, renal artery stenosis, hypothyroidism, depression/anxiety, prediabetes, GERD, renal artery stenosis, who is status post low anterior resection with diverting loop ileostomy due to rectal cancer.      Current Diet/Nutrition:   Orders Placed This Encounter      Low Fiber Diet       Output:  I/O last 3 completed shifts:  In: 4043 [P.O.:1535]  Out: 4830 [Urine:3880; Stool:950]      Labs:   Recent Labs   Lab 05/16/22  0646 05/13/22  0731   ALBUMIN 2.4*  --    HGB  --  11.0*   INR 1.01  --    WBC  --  6.5         Star Risk Assessment:   Sensory Perception: 4-->no impairment  Moisture: 4-->rarely moist  Activity: 4-->walks frequently  Mobility: 4-->no limitation  Nutrition: 3-->adequate  Friction and Shear: 3-->no apparent problem  Star Score: 22      WOC Interventions:       Patient's chart evaluated    Focus of today's visit: verbal instruction , diet and hydration , odor/flatus management, lifestyle adjustments and discharge instructions; supplies and appliance change     Pouch changed 5/16    Participant(s) in teaching session today: patient only     Change made with ostomy management today: switch to convexity     Supplies: at bedside    Educational materials: lakisha folder at bedside.    Preparation for discharge: AVS completed and reviewed with patient     Discussed plan of care with:     Soco Stewart RN, CWOCN

## 2022-05-16 NOTE — PLAN OF CARE
Goal Outcome Evaluation:    Plan of Care Reviewed With: patient     Overall Patient Progress: improving    Patient AxOx4, VSS on room air, lungs are clear.  BP elevated in 160's.  Tolerated full liquid diet, advanced to low fiber diet, tolerated well. Denies nausea. TPN infusing through PICC.  Ileostomy with good output, some watery, some formed.  Stoma pink/red protruding.   Independent in room , ambulated in [place], up in chair.  Taking tramadol and tylenol with some relief.  Plan is to discharge tomorrow or Tuesday.

## 2022-05-17 VITALS
OXYGEN SATURATION: 96 % | RESPIRATION RATE: 16 BRPM | TEMPERATURE: 98.4 F | WEIGHT: 195.99 LBS | DIASTOLIC BLOOD PRESSURE: 58 MMHG | HEIGHT: 63 IN | HEART RATE: 61 BPM | SYSTOLIC BLOOD PRESSURE: 127 MMHG | BODY MASS INDEX: 34.73 KG/M2

## 2022-05-17 LAB
ANION GAP SERPL CALCULATED.3IONS-SCNC: 6 MMOL/L (ref 3–14)
BUN SERPL-MCNC: 32 MG/DL (ref 7–30)
CALCIUM SERPL-MCNC: 8.3 MG/DL (ref 8.5–10.1)
CHLORIDE BLD-SCNC: 109 MMOL/L (ref 94–109)
CO2 SERPL-SCNC: 23 MMOL/L (ref 20–32)
CREAT SERPL-MCNC: 0.94 MG/DL (ref 0.52–1.04)
GFR SERPL CREATININE-BSD FRML MDRD: 64 ML/MIN/1.73M2
GLUCOSE BLD-MCNC: 92 MG/DL (ref 70–99)
MAGNESIUM SERPL-MCNC: 2.4 MG/DL (ref 1.6–2.3)
PHOSPHATE SERPL-MCNC: 3.3 MG/DL (ref 2.5–4.5)
POTASSIUM BLD-SCNC: 4.1 MMOL/L (ref 3.4–5.3)
SODIUM SERPL-SCNC: 138 MMOL/L (ref 133–144)

## 2022-05-17 PROCEDURE — 80048 BASIC METABOLIC PNL TOTAL CA: CPT | Performed by: HOSPITALIST

## 2022-05-17 PROCEDURE — 99232 SBSQ HOSP IP/OBS MODERATE 35: CPT | Performed by: HOSPITALIST

## 2022-05-17 PROCEDURE — 84100 ASSAY OF PHOSPHORUS: CPT | Performed by: HOSPITALIST

## 2022-05-17 PROCEDURE — 250N000013 HC RX MED GY IP 250 OP 250 PS 637: Performed by: HOSPITALIST

## 2022-05-17 PROCEDURE — 250N000013 HC RX MED GY IP 250 OP 250 PS 637: Performed by: NURSE PRACTITIONER

## 2022-05-17 PROCEDURE — 999N000040 HC STATISTIC CONSULT NO CHARGE VASC ACCESS

## 2022-05-17 PROCEDURE — 83735 ASSAY OF MAGNESIUM: CPT | Performed by: HOSPITALIST

## 2022-05-17 RX ORDER — TORSEMIDE 20 MG/1
20 TABLET ORAL DAILY
Start: 2022-05-17 | End: 2022-12-01

## 2022-05-17 RX ORDER — OXYBUTYNIN CHLORIDE 5 MG/1
TABLET ORAL
Qty: 60 TABLET | Refills: 0 | Status: SHIPPED | OUTPATIENT
Start: 2022-05-17 | End: 2022-12-01

## 2022-05-17 RX ORDER — TRAMADOL HYDROCHLORIDE 50 MG/1
50 TABLET ORAL EVERY 6 HOURS PRN
Qty: 20 TABLET | Refills: 0 | Status: SHIPPED | OUTPATIENT
Start: 2022-05-17 | End: 2022-12-01

## 2022-05-17 RX ADMIN — TRAMADOL HYDROCHLORIDE 50 MG: 50 TABLET, COATED ORAL at 11:00

## 2022-05-17 RX ADMIN — ACETAMINOPHEN 650 MG: 325 TABLET ORAL at 08:55

## 2022-05-17 RX ADMIN — UMECLIDINIUM 1 PUFF: 62.5 AEROSOL, POWDER ORAL at 08:43

## 2022-05-17 RX ADMIN — SPIRONOLACTONE 12.5 MG: 25 TABLET ORAL at 08:36

## 2022-05-17 RX ADMIN — OXYBUTYNIN CHLORIDE 5 MG: 5 TABLET ORAL at 08:36

## 2022-05-17 RX ADMIN — ESCITALOPRAM OXALATE 10 MG: 10 TABLET ORAL at 08:36

## 2022-05-17 RX ADMIN — AMIODARONE HYDROCHLORIDE 200 MG: 200 TABLET ORAL at 08:36

## 2022-05-17 RX ADMIN — LEVOTHYROXINE SODIUM 25 MCG: 25 TABLET ORAL at 08:54

## 2022-05-17 RX ADMIN — TRAMADOL HYDROCHLORIDE 50 MG: 50 TABLET, COATED ORAL at 04:58

## 2022-05-17 RX ADMIN — AMLODIPINE BESYLATE 5 MG: 5 TABLET ORAL at 08:36

## 2022-05-17 RX ADMIN — ACETAMINOPHEN 650 MG: 325 TABLET ORAL at 00:28

## 2022-05-17 RX ADMIN — TORSEMIDE 20 MG: 20 TABLET ORAL at 08:36

## 2022-05-17 RX ADMIN — CARVEDILOL 12.5 MG: 12.5 TABLET, FILM COATED ORAL at 08:36

## 2022-05-17 ASSESSMENT — ACTIVITIES OF DAILY LIVING (ADL)
ADLS_ACUITY_SCORE: 25
ADLS_ACUITY_SCORE: 21
ADLS_ACUITY_SCORE: 25

## 2022-05-17 NOTE — PROGRESS NOTES
[facility]    Medicine Progress Note - Hospitalist Service       Date of Admission:  4/28/2022    Assessment & Plan   Janet Figueroa is a 71 year old female with complex PMHx including rectal cancer, CAD s/p PCI, paroxysmal a-fib on chronic anticoagulation with apixaban, renal artery stenosis, CKD, prediabetes, and COPD who was admitted to the Colorectal Surgery service on 4/28/2022 for low anterior resection with diverting loop ileostomy. Hospitalist service was consulted for post-op medical management. Hospital course has been complicated by prolonged post-op ileus      Rectal cancer s/p low anterior resection with diverting loop ileostomy on 4/28/2022  Post-operative ileus  * Underwent procedure under GETA by Dr. Connolly, minimal EBL noted  * Developed acute abdominal pain with nausea/vomiting on 5/2. NG tube placed.    * 5/6: NG tube removed with recurrence of nausea/vomiting  * 5/7: CT abd/pelvis showed small bowel obstruction  - No nausea/vomiting for ~24 hours now. Diet advanced to full liquids by CRS, 5/9.  However patient had worsening symptoms and hence made n.p.o. again and NG tube replaced.  - On TPN for supplemental nutrition per CRS  - Currently on enoxaparin for VTE ppx, plan to resume home apixaban per CRS once ileus resolves  -  HENNY drain has been removed.  - Patient is progressing well.  NG tube clamping trial passed and NG tube was removed on 5/12.  Currently on low fiber diet.  -5/16: Progressing well.  TPN to be weaned off today.  -5/17: Patient discharged home in stable condition per primary service.    Non-anion gap metabolic acidosis, resolved  -  ?increased acid production vs GI losses.  Lactate normal.  - Nephrology consult ordered.  Recommend replace bicarb as needed however oral will be tough given GI issues.  Nephrology has started on D5 half NS with 75 meq bicarb.  Nephrology has signed off.  -IVF discontinued 5/13.    Left thumb pain and swelling, suspect  sprain, Improving  Developed persistent left thumb pain and swelling during this hospitalization after trying to push herself up. Left hand XR (5/6) showed no acute fractures or dislocations. Suspect sprain - pain and swelling has improved with supportive cares  - Continue supportive cares: RICE, pain meds prn     KAMRON on CKD, stage IIIa, Resolved  History of fibromuscular dysplasia of the right renal artery  * Creatinine increased up to 2.68 from baseline ~1 in the setting of ileus with nausea/vomiting and decreased PO intake; corrected with IV fluids and TPN  - Creatinine now stable ~0.6 range      Hypokalemia, Resolved  Due to GI losses  - Potassium was replaced per protocol     CAD, native heart, native vessels s/p PCI (RCA, 11/2019; LCx, 07/2008)  Chronic diastolic CHF  Essential hypertension  * PTA: carvedilol 12.5 mg BID, amlodipine 5 mg daily, irbesartan 300 mg daily, torsemide 20 mg BID, spironolactone 25 mg daily, atorvastatin 80 mg qhs  * Coronary angiogram (01/2020) showed no evidence for recurrent occlusive CAD  * TTE (11/2019) showed EF 55%, mild left atrial enlargement, mild MR, and trace TR  - Continues on PTA carvedilol  and amlodipine  -Resumed irbesartan and torsemide once daily on 5/13.  Holding spironolactone.  Patient notes increased frequency of urination, denies any dysuria symptoms.  Noted recent UA was negative for infection.  - Continue holding atorvastatin for now  - Hydralazine prn  -Adding back spironolactone 12.5 Mg daily on 5/16.  Also resumed statin as oral intake has improved.  -At discharge: She has been asked to continue on all her PTA cardiac meds with only changes being decreasing torsemide 20 Mg to once daily.  Other meds remain the same.  Follow-up with PCP in a week with BMP prior.  Check weights daily and if weight goes up by 3 pounds overnight, she can take additional torsemide dose in the afternoon.     Paroxysmal atrial fibrillation   * PTA: carvedilol 12.5 mg BID,  apixaban 5 mg BID, amiodarone 200 Mg daily  - Continues on PTA carvedilol, PTA amiodarone  - Holding apixaban in setting of ileus, will resume apixaban on discharge    Pyuria  Hematuria, Resolved  Bladder spasms  * UA ordered on 4/30 for hematuria which has resolved (suspect traumatic Gottlieb catheter placement which has since been removed); also showed large LE and 29 wbcs. Patient reported urgency, which seems to be chronic. No fever/leukocytosis noted, and urine culture returned negative. Antibiotics were deferred  -Due to persistent issues with urgency and bladder spasms, started on oxybutynin every evening as needed on 5/15.  Increased to twice daily on 5/16.  -Discharged on oxybutynin, instructed to take 2-3 times daily based on symptoms.    COPD  * Chronic and stable on Incruse daily, albuterol prn  * Appears compensated without evidence of exacerbation     Prediabetes  Chronic and stable  - High SSI available, but blood sugars have been acceptable     Hypothyroidism  Chronic and stable on levothyroxine     Major recurrent depressive disorder with anxiety  Chronic and stable on escitalopram    Macrocytosis  - B12 level adequate.  Hemoglobin normal.     Diet: Diet    DVT Prophylaxis: Enoxaparin (Lovenox) SQ  Gottlieb Catheter: Not present  Code Status:           Disposition: Expected discharge as per CRS, home today with home therapies.    The patient's care was discussed with the Patient.    Sarah Armando MD  Hospitalist Service  [facility]  Contact information available via [facility] Paging/Directory    ______________________________________________________________________    Interval History   Patient continues to feel improved.  Discussed discharge planning with patient.  Answered several questions.  She feels confident and ready to go home today.    Data reviewed today: I reviewed all medications, new labs and imaging results over the last 24 hours. I personally reviewed the left hand  XR image(s) showing no acute fractures or dislocations.    Physical Exam   Vital Signs: Temp: 98.4  F (36.9  C) Temp src: Oral BP: 127/58 Pulse: 61   Resp: 16 SpO2: 96 % O2 Device: None (Room air)    Weight: 195 lbs 15.82 oz  Constitutional: Resting in bed, NAD  Respiratory: Breathing non-labored. Lungs CTAB  Cardiovascular: Heart RRR, no m/r/g. Minimal pedal edema  GI: Hypoactive BS, abd soft. Ostomy with green liquid output.  Skin/Integument: No rash   Neuro: Alert and appropriate, SANCHEZ  Psych: Calm and cooperative    Data   Recent Labs   Lab 05/17/22  0507 05/16/22  1711 05/16/22  0646 05/15/22  0624 05/15/22  0607 05/14/22  1713 05/14/22  0819 05/14/22  0555 05/13/22  0731   WBC  --   --   --   --   --   --   --   --  6.5   HGB  --   --   --   --   --   --   --   --  11.0*   MCV  --   --   --   --   --   --   --   --  100   PLT  --   --  395  --   --   --   --   --  301   INR  --   --  1.01  --   --   --   --   --   --      --  138  --   --   --  139  --  141   POTASSIUM 4.1  --  4.1  --  3.6  --  3.6   < > 3.6   CHLORIDE 109  --  110*  --   --   --  106  --  109   CO2 23  --  21  --   --   --  27  --  29   BUN 32*  --  29  --   --   --  17  --  15   CR 0.94  --  0.91  --   --   --  0.80  --  0.70   ANIONGAP 6  --  7  --   --   --  6  --  3   GISELLE 8.3*  --  8.1*  --   --   --  8.3*  --  8.1*   GLC 92 108* 101*   < >  --    < > 108*   < > 112*   ALBUMIN  --   --  2.4*  --   --   --   --   --   --    PROTTOTAL  --   --  5.8*  --   --   --   --   --   --    BILITOTAL  --   --  0.5  --   --   --   --   --   --    ALKPHOS  --   --  90  --   --   --   --   --   --    ALT  --   --  42  --   --   --   --   --   --    AST  --   --  29  --   --   --   --   --   --     < > = values in this interval not displayed.         No results found for this or any previous visit (from the past 24 hour(s)).    Medications

## 2022-05-17 NOTE — DISCHARGE SUMMARY
[facility] Discharge Summary      Janet Figueroa MRN# [id number]   Age: 71 year old YOB: 1950     Date of Admission:  4/28/2022  Date of Discharge::  5/17/2022 11:26 AM  Admitting Physician:  Lay Connolly MD  Discharge Physician:  Lay Connolly MD     PCP:  Melissa Abreu MD    Disposition: Patient discharged from [facility] to home in stable condition.        Primary Diagnosis:   Rectal cancer          Discharge Medications:     Discharge Medication List as of 5/17/2022 10:27 AM      START taking these medications    Details   oxybutynin (DITROPAN) 5 MG tablet Take 5 mg tablet 2 to 3 times daily for urinary urgency/ frequency/ overactive bladder symptoms, Disp-60 tablet, R-0, E-Prescribe      traMADol (ULTRAM) 50 MG tablet 1 tablet (50 mg) by Oral or NG Tube route every 6 hours as needed for moderate pain, Disp-20 tablet, R-0, E-Prescribe         CONTINUE these medications which have CHANGED    Details   torsemide (DEMADEX) 20 MG tablet Take 1 tablet (20 mg) by mouth daily, No Print Out         CONTINUE these medications which have NOT CHANGED    Details   acetaminophen (TYLENOL) 500 MG tablet Take 500-1,000 mg by mouth every 6 hours as needed for mild pain, Historical      albuterol (PROAIR HFA/PROVENTIL HFA/VENTOLIN HFA) 108 (90 Base) MCG/ACT inhaler Inhale 2 puffs into the lungs 4 times daily as needed, Historical      amiodarone (PACERONE) 200 MG tablet Take 1 tablet by mouth daily, Historical      amLODIPine (NORVASC) 5 MG tablet Take 1 tablet by mouth daily, Historical      apixaban ANTICOAGULANT (ELIQUIS) 5 MG tablet Take 5 mg by mouth 2 times daily, Historical      atorvastatin (LIPITOR) 80 MG tablet Take 80 mg by mouth At Bedtime, Historical      carvedilol (COREG) 12.5 MG tablet Take 12.5 mg by mouth 2 times daily (with meals), Historical      escitalopram (LEXAPRO) 10 MG tablet Take 1 tablet by mouth daily, Historical      irbesartan  (AVAPRO) 300 MG tablet Take 1 tablet by mouth daily, Historical      levothyroxine (SYNTHROID/LEVOTHROID) 25 MCG tablet Take 1 tablet by mouth daily, Historical      spironolactone (ALDACTONE) 50 MG tablet Take 25 mg by mouth daily (0.5 x 50 mg = 25 mg), Historical      umeclidinium (INCRUSE ELLIPTA) 62.5 MCG/INH inhaler Inhale 1 puff into the lungs daily, Historical         STOP taking these medications       metroNIDAZOLE (FLAGYL) 500 MG tablet Comments:   Reason for Stopping:         neomycin (MYCIFRADIN) 500 MG tablet Comments:   Reason for Stopping:                      Follow Up, Special Instructions:     Discharge diet: Low residue   Discharge activity: Lifting restricted to 10-15 pounds   Discharge follow-up: Follow up with Dr. Connolly in 3-4 weeks   Wound care: May get incision wet in shower but do not soak or scrub              Procedures:     Procedure(s): Robotic low anterior resection with diverting loop ileostomy       No other procedures performed during this admission            Consultations:   Hospitalist  ROB  nephrology          Brief Hospital Summary:     Patient is a 71 year old female who underwent Robotic low anterior resection with diverting loop ileostomy on 4/28/22 by Dr. Connolly.   There were no immediate complications during this procedure.    Please refer to the full operative summary for details. The patient's post operative course was complicated by a prolonged post op ileus and KAMRON. Ileus managed conservatively with bowel rest, NGT and fluid replacement. KAMRON resolved with adequate fluid hydration.  Pain was controlled on oral pain regimen.  At time of discharge, she was tolerating a low fiber diet.  Bowel function had returned prior to discharge and patient managing stoma independently.  Discharged to home with  in stable and improving condition.         Attestation:  I have reviewed today's vital signs, notes, medications, labs and imaging.    Olga Rangel PA-C  Colorectal  Physician Assistant    [facility]  [address]  [place] [address]  T: [phone]  F: [phone]            ADDENDUM:  Length of stay: 19 days  Indicate Y or N for the following:  UTI  No  C diff  No  PNA  No  SSI No  DVT No  PE  No  CVA No  MI No  Enterocutaneous fistula  No  Peripheral nerve injury  No  Abscess (not adjacent to anastomosis)  No  Leak No    Treated with:   Antibiotics NO   Drain  NO   Reoperation  NO  Death within 30 days No  Reintubation  No  Reoperation  No   Procedure n/a    FOR CANCER CASES:  T stage: 1  N stage: 0   Total number of nodes: 17   Total positive: 0  M stage: 0  R: 0  TME grade, if known (1,2,3):   MSI (pos, neg): neg

## 2022-05-17 NOTE — PLAN OF CARE
Pt VSS, on RA, A/O x4.  Pt c/o of sacrum and knee pain, managing with tramadol and tylenol.  Tolerating low fiber diet, good appetite. BS in all quadrants, denies nausea.  Colostomy with moderate brown liquid output. Pt voiding adequately, but complains of frequency.  PICC SL,TPN discontinued. Discharge pending.

## 2022-05-17 NOTE — PLAN OF CARE
Goal Outcome Evaluation:    [id number]    Assumed patient care at 2300. POD #19 low anterior resection and diverting loop ileostomy, complicated by post op ileus. Janet is doing well and in good spirits. A&Ox4. VSS on RA. Pain managed with positional changes and PRN tylenol. Up to BR appropriately, voiding adequately. Ileostomy remains intact, Janet empties pouch indp, but leaves contents in BR for staff to assess. Brown, loose stool noted. Abdominal lap sites remain intact, approximated with liquid bandage and FUAD. Tolerating low fiber diet well, denies n/v. Strict I&O. Janet is eager to discharge home today. Anticipate discharge when cleared. Will continue to monitor.

## 2022-05-17 NOTE — PROGRESS NOTES
Pt A&O x4, up indep, VSS. Pain managed with Tramadol and Tyl. Ileostomy patent, pt emptied pouch indep. Abd incision sites dry and intact, open to air. Tolerated low fiber diet well. Denied n/v. AVS and med reviewed with pt, all questions answered. Pt discharge at 1130 with  transportation.

## 2022-05-17 NOTE — PROGRESS NOTES
Care Management Discharge Note    Discharge Date: 05/17/2022       Discharge Disposition: Home Care    Discharge Services:      Discharge DME:  Ostomy supplies as coordinated by C RN  Discharge Transportation: family or friend will provide    Private pay costs discussed: Not applicable    PAS Confirmation Code:    Patient/family educated on Medicare website which has current facility and service quality ratings: yes    Education Provided on the Discharge Plan:    Persons Notified of Discharge Plans: RN  Patient/Family in Agreement with the Plan: yes    Handoff Referral Completed: Yes    Additional Information:  Home today with [facility] RN services provided by Rebekah Lee.   Orders faxed to [facility] at [phone].      Fely Siegel RN   [facility]   Phone [phone] or [phone]

## 2022-05-17 NOTE — PROGRESS NOTES
COLON & RECTAL SURGERY  PROGRESS NOTE    May 17, 2022  Post-op Day # 19 LAR with DLI    SUBJECTIVE:  Tolerating LFD. Off TPN. Ambulating. Pain controlled with PO pain meds.     OBJECTIVE:  Temp:  [98.2  F (36.8  C)-98.9  F (37.2  C)] 98.9  F (37.2  C)  Pulse:  [55] 55  Resp:  [17-18] 17  BP: (131-147)/(54-60) 147/60  SpO2:  [96 %] 96 %    Intake/Output Summary (Last 24 hours) at 5/17/2022 0834  Last data filed at 5/17/2022 0831  Gross per 24 hour   Intake 2786 ml   Output 4575 ml   Net -1789 ml       GENERAL:  Awake, alert, no acute distress  HEAD: Normocephalic atraumatic  SCLERA: Anicteric  EXTREMITIES: Warm and well perfused  ABDOMEN:  Soft, appropriately tender, non-distended. No guarding, rigidity, or peritoneal signs. Stoma pink and protruding with gas and stool present in bag.   INCISION:  C/d/i,     LABS:  Lab Results   Component Value Date    WBC 6.5 05/13/2022     Lab Results   Component Value Date    HGB 11.0 05/13/2022     Lab Results   Component Value Date    HCT 33.1 05/13/2022     Lab Results   Component Value Date     05/16/2022     Last Basic Metabolic Panel:  Lab Results   Component Value Date     05/17/2022      Lab Results   Component Value Date    POTASSIUM 4.1 05/17/2022     Lab Results   Component Value Date    CHLORIDE 109 05/17/2022     Lab Results   Component Value Date    GISELLE 8.3 05/17/2022     Lab Results   Component Value Date    CO2 23 05/17/2022     Lab Results   Component Value Date    BUN 32 05/17/2022     Lab Results   Component Value Date    CR 0.94 05/17/2022     Lab Results   Component Value Date    GLC 92 05/17/2022       ASSESSMENT/PLAN: POD#19 robot LAR with DLI. Course complicated by ileus. NGT removed 5/6, replaced, removed again 5/12. Now with return of bowel function.     1. Low fiber diet  2. PRN PO pain meds  3. OOB, ambulate  4. To resume Eliquis at discharge.   5. Discharge to home with [facility] today.     Discussed with Dr. Connolly.     For questions/paging,  please contact the CRS office at [phone].    Olga Rangel PA-C  Colorectal Physician Assistant    [facility]  [address]  [place] [address]  T: [phone]  F: [phone]

## 2022-05-18 ENCOUNTER — PATIENT OUTREACH (OUTPATIENT)
Dept: CARE COORDINATION | Facility: CLINIC | Age: 72
End: 2022-05-18
Payer: COMMERCIAL

## 2022-05-18 DIAGNOSIS — Z71.89 OTHER SPECIFIED COUNSELING: ICD-10-CM

## 2022-05-18 NOTE — PROGRESS NOTES
Clinic Care Coordination Contact  [facility]: Post-Discharge Note  SITUATION                                                      Admission:    Admission Date: 04/28/22   Reason for Admission: Rectal cancer  Discharge:   Discharge Date: 05/17/22  Discharge Diagnosis: Rectal cancer    BACKGROUND                                                      Per hospital discharge summary and inpatient provider notes:  71 year old female who underwent Robotic low anterior resection with diverting loop ileostomy on 4/28/22 by Dr. Connolly.   There were no immediate complications during this procedure.    Please refer to the full operative summary for details. The patient's post operative course was complicated by a prolonged post op ileus and KAMRON. Ileus managed conservatively with bowel rest, NGT and fluid replacement. KAMRON resolved with adequate fluid hydration.  Pain was controlled on oral pain regimen.  At time of discharge, she was tolerating a low fiber diet.  Bowel function had returned prior to discharge and patient managing stoma independently.  Discharged to home with  in stable and improving condition.       ASSESSMENT      Enrollment  Primary Care Care Coordination Status: Not a Candidate    Discharge Assessment  How are you doing now that you are home?: i am not feeling so well this, but I am doing ok- just slow  How are your symptoms? (Red Flag symptoms escalate to triage hotline per guidelines):  (does not feel that this is a appropriate question for her.)  Do you feel your condition is stable enough to be safe at home until your provider visit?: Yes  Does the patient have their discharge instructions? : Yes  Does the patient have questions regarding their discharge instructions? : No  Were you started on any new medications or were there changes to any of your previous medications? : Yes  Does the patient have all of their medications?: Yes  Do you have questions regarding any of your medications? :  No  Do you have all of your needed medical supplies or equipment (DME)?  (i.e. oxygen tank, CPAP, cane, etc.): Yes  Discharge follow-up appointment scheduled within 14 calendar days? : No  Is patient agreeable to assistance with scheduling? : No (will call this morning.)    Post-op ([facility] CTA Only)  If the patient had a surgery or procedure, do they have any questions for a nurse?: No (Robotic low anterior resection with diverting loop ileostomy)      PLAN                                                      Outpatient Plan:  Follow-up and recommended labs and tests  Follow up in the office in 4 weeks with Dr. Connolly or at your already scheduled post op visit. Call [phone] to schedule  your appointment.  Call the office at [phone] if you develop fever >101, uncontrolled pain, bleeding, nausea, vomiting, or constipation  (no stool for 4-5 days).  Follow-up and recommended labs and tests  Follow up with primary care provider, Melissa Silva MD, within 7 days for hospital follow- up. The  following labs/tests are recommended: BMP in a week prior to PCP follow up visit    No future appointments.      For any urgent concerns, please contact our 24 hour nurse triage line: [phone] ([phone])         Vanna Madsen MA

## 2022-12-01 RX ORDER — TORSEMIDE 20 MG/1
30 TABLET ORAL DAILY
COMMUNITY
Start: 2022-03-28 | End: 2023-03-28

## 2022-12-07 ENCOUNTER — ANESTHESIA EVENT (OUTPATIENT)
Dept: SURGERY | Facility: CLINIC | Age: 72
DRG: 330 | End: 2022-12-07
Payer: MEDICARE

## 2022-12-07 ENCOUNTER — HOSPITAL ENCOUNTER (INPATIENT)
Facility: CLINIC | Age: 72
LOS: 3 days | Discharge: HOME | DRG: 330 | End: 2022-12-10
Attending: COLON & RECTAL SURGERY | Admitting: COLON & RECTAL SURGERY
Payer: MEDICARE

## 2022-12-07 ENCOUNTER — ANESTHESIA (OUTPATIENT)
Dept: SURGERY | Facility: CLINIC | Age: 72
DRG: 330 | End: 2022-12-07
Payer: MEDICARE

## 2022-12-07 DIAGNOSIS — Z85.048 HISTORY OF RECTAL CANCER: Primary | ICD-10-CM

## 2022-12-07 DIAGNOSIS — E87.6 HYPOKALEMIA: ICD-10-CM

## 2022-12-07 LAB
CREAT SERPL-MCNC: 0.88 MG/DL (ref 0.51–0.95)
GFR SERPL CREATININE-BSD FRML MDRD: 69 ML/MIN/1.73M2
PLATELET # BLD AUTO: 266 10E3/UL (ref 150–450)

## 2022-12-07 PROCEDURE — 88304 TISSUE EXAM BY PATHOLOGIST: CPT | Mod: TC | Performed by: COLON & RECTAL SURGERY

## 2022-12-07 PROCEDURE — 250N000009 HC RX 250: Performed by: NURSE ANESTHETIST, CERTIFIED REGISTERED

## 2022-12-07 PROCEDURE — 360N000077 HC SURGERY LEVEL 4, PER MIN: Performed by: COLON & RECTAL SURGERY

## 2022-12-07 PROCEDURE — 36415 COLL VENOUS BLD VENIPUNCTURE: CPT | Performed by: COLON & RECTAL SURGERY

## 2022-12-07 PROCEDURE — 710N000010 HC RECOVERY PHASE 1, LEVEL 2, PER MIN: Performed by: COLON & RECTAL SURGERY

## 2022-12-07 PROCEDURE — 258N000003 HC RX IP 258 OP 636: Performed by: ANESTHESIOLOGY

## 2022-12-07 PROCEDURE — 272N000001 HC OR GENERAL SUPPLY STERILE: Performed by: COLON & RECTAL SURGERY

## 2022-12-07 PROCEDURE — 250N000011 HC RX IP 250 OP 636: Performed by: NURSE ANESTHETIST, CERTIFIED REGISTERED

## 2022-12-07 PROCEDURE — 85049 AUTOMATED PLATELET COUNT: CPT | Performed by: COLON & RECTAL SURGERY

## 2022-12-07 PROCEDURE — 250N000011 HC RX IP 250 OP 636: Performed by: ANESTHESIOLOGY

## 2022-12-07 PROCEDURE — 250N000009 HC RX 250: Performed by: COLON & RECTAL SURGERY

## 2022-12-07 PROCEDURE — 250N000013 HC RX MED GY IP 250 OP 250 PS 637: Performed by: COLON & RECTAL SURGERY

## 2022-12-07 PROCEDURE — 999N000141 HC STATISTIC PRE-PROCEDURE NURSING ASSESSMENT: Performed by: COLON & RECTAL SURGERY

## 2022-12-07 PROCEDURE — 0DBB0ZZ EXCISION OF ILEUM, OPEN APPROACH: ICD-10-PCS | Performed by: COLON & RECTAL SURGERY

## 2022-12-07 PROCEDURE — 120N000001 HC R&B MED SURG/OB

## 2022-12-07 PROCEDURE — 258N000003 HC RX IP 258 OP 636: Performed by: COLON & RECTAL SURGERY

## 2022-12-07 PROCEDURE — 370N000017 HC ANESTHESIA TECHNICAL FEE, PER MIN: Performed by: COLON & RECTAL SURGERY

## 2022-12-07 PROCEDURE — 250N000009 HC RX 250: Performed by: ANESTHESIOLOGY

## 2022-12-07 PROCEDURE — 250N000011 HC RX IP 250 OP 636: Performed by: COLON & RECTAL SURGERY

## 2022-12-07 PROCEDURE — 82565 ASSAY OF CREATININE: CPT | Performed by: COLON & RECTAL SURGERY

## 2022-12-07 RX ORDER — ATORVASTATIN CALCIUM 40 MG/1
80 TABLET, FILM COATED ORAL AT BEDTIME
Status: DISCONTINUED | OUTPATIENT
Start: 2022-12-08 | End: 2022-12-10 | Stop reason: HOSPADM

## 2022-12-07 RX ORDER — NALOXONE HYDROCHLORIDE 0.4 MG/ML
0.2 INJECTION, SOLUTION INTRAMUSCULAR; INTRAVENOUS; SUBCUTANEOUS
Status: DISCONTINUED | OUTPATIENT
Start: 2022-12-07 | End: 2022-12-10 | Stop reason: HOSPADM

## 2022-12-07 RX ORDER — ENOXAPARIN SODIUM 100 MG/ML
40 INJECTION SUBCUTANEOUS EVERY 24 HOURS
Status: DISCONTINUED | OUTPATIENT
Start: 2022-12-08 | End: 2022-12-10 | Stop reason: HOSPADM

## 2022-12-07 RX ORDER — LIDOCAINE 40 MG/G
CREAM TOPICAL
Status: DISCONTINUED | OUTPATIENT
Start: 2022-12-07 | End: 2022-12-10 | Stop reason: HOSPADM

## 2022-12-07 RX ORDER — NEOSTIGMINE METHYLSULFATE 1 MG/ML
VIAL (ML) INJECTION PRN
Status: DISCONTINUED | OUTPATIENT
Start: 2022-12-07 | End: 2022-12-07

## 2022-12-07 RX ORDER — HYDROMORPHONE HCL IN WATER/PF 6 MG/30 ML
0.4 PATIENT CONTROLLED ANALGESIA SYRINGE INTRAVENOUS
Status: DISCONTINUED | OUTPATIENT
Start: 2022-12-07 | End: 2022-12-10 | Stop reason: HOSPADM

## 2022-12-07 RX ORDER — IRBESARTAN 150 MG/1
150 TABLET ORAL DAILY
Status: DISCONTINUED | OUTPATIENT
Start: 2022-12-07 | End: 2022-12-10 | Stop reason: HOSPADM

## 2022-12-07 RX ORDER — PROPOFOL 10 MG/ML
INJECTION, EMULSION INTRAVENOUS PRN
Status: DISCONTINUED | OUTPATIENT
Start: 2022-12-07 | End: 2022-12-07

## 2022-12-07 RX ORDER — GLYCOPYRROLATE 0.2 MG/ML
INJECTION, SOLUTION INTRAMUSCULAR; INTRAVENOUS PRN
Status: DISCONTINUED | OUTPATIENT
Start: 2022-12-07 | End: 2022-12-07

## 2022-12-07 RX ORDER — ONDANSETRON 2 MG/ML
4 INJECTION INTRAMUSCULAR; INTRAVENOUS EVERY 30 MIN PRN
Status: DISCONTINUED | OUTPATIENT
Start: 2022-12-07 | End: 2022-12-07 | Stop reason: HOSPADM

## 2022-12-07 RX ORDER — MAGNESIUM HYDROXIDE 1200 MG/15ML
LIQUID ORAL PRN
Status: DISCONTINUED | OUTPATIENT
Start: 2022-12-07 | End: 2022-12-07 | Stop reason: HOSPADM

## 2022-12-07 RX ORDER — ERTAPENEM 1 G/1
1 INJECTION, POWDER, LYOPHILIZED, FOR SOLUTION INTRAMUSCULAR; INTRAVENOUS
Status: COMPLETED | OUTPATIENT
Start: 2022-12-07 | End: 2022-12-07

## 2022-12-07 RX ORDER — ONDANSETRON 2 MG/ML
INJECTION INTRAMUSCULAR; INTRAVENOUS PRN
Status: DISCONTINUED | OUTPATIENT
Start: 2022-12-07 | End: 2022-12-07

## 2022-12-07 RX ORDER — OXYCODONE HYDROCHLORIDE 5 MG/1
5 TABLET ORAL EVERY 4 HOURS PRN
Status: DISCONTINUED | OUTPATIENT
Start: 2022-12-07 | End: 2022-12-10 | Stop reason: HOSPADM

## 2022-12-07 RX ORDER — FENTANYL CITRATE 50 UG/ML
INJECTION, SOLUTION INTRAMUSCULAR; INTRAVENOUS PRN
Status: DISCONTINUED | OUTPATIENT
Start: 2022-12-07 | End: 2022-12-07

## 2022-12-07 RX ORDER — DEXAMETHASONE SODIUM PHOSPHATE 4 MG/ML
INJECTION, SOLUTION INTRA-ARTICULAR; INTRALESIONAL; INTRAMUSCULAR; INTRAVENOUS; SOFT TISSUE PRN
Status: DISCONTINUED | OUTPATIENT
Start: 2022-12-07 | End: 2022-12-07

## 2022-12-07 RX ORDER — ALBUTEROL SULFATE 90 UG/1
2 AEROSOL, METERED RESPIRATORY (INHALATION) 4 TIMES DAILY PRN
Status: DISCONTINUED | OUTPATIENT
Start: 2022-12-07 | End: 2022-12-10 | Stop reason: HOSPADM

## 2022-12-07 RX ORDER — HALOPERIDOL 5 MG/ML
1 INJECTION INTRAMUSCULAR ONCE
Status: COMPLETED | OUTPATIENT
Start: 2022-12-07 | End: 2022-12-07

## 2022-12-07 RX ORDER — FENTANYL CITRATE 50 UG/ML
50 INJECTION, SOLUTION INTRAMUSCULAR; INTRAVENOUS EVERY 5 MIN PRN
Status: DISCONTINUED | OUTPATIENT
Start: 2022-12-07 | End: 2022-12-07 | Stop reason: HOSPADM

## 2022-12-07 RX ORDER — OXYCODONE HYDROCHLORIDE 5 MG/1
10 TABLET ORAL EVERY 4 HOURS PRN
Status: DISCONTINUED | OUTPATIENT
Start: 2022-12-07 | End: 2022-12-10 | Stop reason: HOSPADM

## 2022-12-07 RX ORDER — HEPARIN SODIUM 5000 [USP'U]/.5ML
5000 INJECTION, SOLUTION INTRAVENOUS; SUBCUTANEOUS
Status: COMPLETED | OUTPATIENT
Start: 2022-12-07 | End: 2022-12-07

## 2022-12-07 RX ORDER — ACETAMINOPHEN 325 MG/1
650 TABLET ORAL EVERY 4 HOURS PRN
Status: DISCONTINUED | OUTPATIENT
Start: 2022-12-10 | End: 2022-12-10 | Stop reason: HOSPADM

## 2022-12-07 RX ORDER — SODIUM CHLORIDE, SODIUM LACTATE, POTASSIUM CHLORIDE, CALCIUM CHLORIDE 600; 310; 30; 20 MG/100ML; MG/100ML; MG/100ML; MG/100ML
INJECTION, SOLUTION INTRAVENOUS CONTINUOUS
Status: DISCONTINUED | OUTPATIENT
Start: 2022-12-07 | End: 2022-12-08

## 2022-12-07 RX ORDER — FENTANYL CITRATE 50 UG/ML
25 INJECTION, SOLUTION INTRAMUSCULAR; INTRAVENOUS EVERY 5 MIN PRN
Status: DISCONTINUED | OUTPATIENT
Start: 2022-12-07 | End: 2022-12-07 | Stop reason: HOSPADM

## 2022-12-07 RX ORDER — ACETAMINOPHEN 325 MG/1
975 TABLET ORAL ONCE
Status: COMPLETED | OUTPATIENT
Start: 2022-12-07 | End: 2022-12-07

## 2022-12-07 RX ORDER — AMIODARONE HYDROCHLORIDE 200 MG/1
200 TABLET ORAL DAILY
Status: DISCONTINUED | OUTPATIENT
Start: 2022-12-08 | End: 2022-12-10 | Stop reason: HOSPADM

## 2022-12-07 RX ORDER — ONDANSETRON 4 MG/1
4 TABLET, ORALLY DISINTEGRATING ORAL EVERY 30 MIN PRN
Status: DISCONTINUED | OUTPATIENT
Start: 2022-12-07 | End: 2022-12-07 | Stop reason: HOSPADM

## 2022-12-07 RX ORDER — HYDROMORPHONE HCL IN WATER/PF 6 MG/30 ML
0.2 PATIENT CONTROLLED ANALGESIA SYRINGE INTRAVENOUS
Status: DISCONTINUED | OUTPATIENT
Start: 2022-12-07 | End: 2022-12-10 | Stop reason: HOSPADM

## 2022-12-07 RX ORDER — ESCITALOPRAM OXALATE 20 MG/1
20 TABLET ORAL DAILY
Status: DISCONTINUED | OUTPATIENT
Start: 2022-12-08 | End: 2022-12-10 | Stop reason: HOSPADM

## 2022-12-07 RX ORDER — ACETAMINOPHEN 325 MG/1
975 TABLET ORAL EVERY 8 HOURS
Status: COMPLETED | OUTPATIENT
Start: 2022-12-07 | End: 2022-12-10

## 2022-12-07 RX ORDER — LEVOTHYROXINE SODIUM 25 UG/1
25 TABLET ORAL DAILY
Status: DISCONTINUED | OUTPATIENT
Start: 2022-12-08 | End: 2022-12-10 | Stop reason: HOSPADM

## 2022-12-07 RX ORDER — CARVEDILOL 12.5 MG/1
12.5 TABLET ORAL 2 TIMES DAILY WITH MEALS
Status: DISCONTINUED | OUTPATIENT
Start: 2022-12-07 | End: 2022-12-10 | Stop reason: HOSPADM

## 2022-12-07 RX ORDER — HYDROMORPHONE HCL IN WATER/PF 6 MG/30 ML
0.2 PATIENT CONTROLLED ANALGESIA SYRINGE INTRAVENOUS EVERY 5 MIN PRN
Status: DISCONTINUED | OUTPATIENT
Start: 2022-12-07 | End: 2022-12-07 | Stop reason: HOSPADM

## 2022-12-07 RX ORDER — BUPIVACAINE HYDROCHLORIDE AND EPINEPHRINE 2.5; 5 MG/ML; UG/ML
INJECTION, SOLUTION EPIDURAL; INFILTRATION; INTRACAUDAL; PERINEURAL PRN
Status: DISCONTINUED | OUTPATIENT
Start: 2022-12-07 | End: 2022-12-07 | Stop reason: HOSPADM

## 2022-12-07 RX ORDER — SODIUM CHLORIDE, SODIUM LACTATE, POTASSIUM CHLORIDE, CALCIUM CHLORIDE 600; 310; 30; 20 MG/100ML; MG/100ML; MG/100ML; MG/100ML
INJECTION, SOLUTION INTRAVENOUS CONTINUOUS
Status: DISCONTINUED | OUTPATIENT
Start: 2022-12-07 | End: 2022-12-07 | Stop reason: HOSPADM

## 2022-12-07 RX ORDER — NALOXONE HYDROCHLORIDE 0.4 MG/ML
0.4 INJECTION, SOLUTION INTRAMUSCULAR; INTRAVENOUS; SUBCUTANEOUS
Status: DISCONTINUED | OUTPATIENT
Start: 2022-12-07 | End: 2022-12-10 | Stop reason: HOSPADM

## 2022-12-07 RX ORDER — ONDANSETRON 2 MG/ML
4 INJECTION INTRAMUSCULAR; INTRAVENOUS EVERY 6 HOURS PRN
Status: DISCONTINUED | OUTPATIENT
Start: 2022-12-07 | End: 2022-12-10 | Stop reason: HOSPADM

## 2022-12-07 RX ORDER — LIDOCAINE 40 MG/G
CREAM TOPICAL
Status: DISCONTINUED | OUTPATIENT
Start: 2022-12-07 | End: 2022-12-07 | Stop reason: HOSPADM

## 2022-12-07 RX ORDER — ONDANSETRON 4 MG/1
4 TABLET, ORALLY DISINTEGRATING ORAL EVERY 6 HOURS PRN
Status: DISCONTINUED | OUTPATIENT
Start: 2022-12-07 | End: 2022-12-10 | Stop reason: HOSPADM

## 2022-12-07 RX ORDER — PROPOFOL 10 MG/ML
INJECTION, EMULSION INTRAVENOUS CONTINUOUS PRN
Status: DISCONTINUED | OUTPATIENT
Start: 2022-12-07 | End: 2022-12-07

## 2022-12-07 RX ORDER — HYDROMORPHONE HCL IN WATER/PF 6 MG/30 ML
0.4 PATIENT CONTROLLED ANALGESIA SYRINGE INTRAVENOUS EVERY 5 MIN PRN
Status: DISCONTINUED | OUTPATIENT
Start: 2022-12-07 | End: 2022-12-07 | Stop reason: HOSPADM

## 2022-12-07 RX ADMIN — SODIUM CHLORIDE, POTASSIUM CHLORIDE, SODIUM LACTATE AND CALCIUM CHLORIDE: 600; 310; 30; 20 INJECTION, SOLUTION INTRAVENOUS at 12:53

## 2022-12-07 RX ADMIN — ONDANSETRON HYDROCHLORIDE 4 MG: 2 INJECTION, SOLUTION INTRAVENOUS at 14:18

## 2022-12-07 RX ADMIN — ROCURONIUM BROMIDE 10 MG: 50 INJECTION, SOLUTION INTRAVENOUS at 13:56

## 2022-12-07 RX ADMIN — PROPOFOL 200 MG: 10 INJECTION, EMULSION INTRAVENOUS at 12:59

## 2022-12-07 RX ADMIN — CARVEDILOL 12.5 MG: 12.5 TABLET, FILM COATED ORAL at 18:41

## 2022-12-07 RX ADMIN — FENTANYL CITRATE 50 MCG: 50 INJECTION INTRAMUSCULAR; INTRAVENOUS at 15:22

## 2022-12-07 RX ADMIN — MIDAZOLAM 2 MG: 1 INJECTION INTRAMUSCULAR; INTRAVENOUS at 12:53

## 2022-12-07 RX ADMIN — ROCURONIUM BROMIDE 50 MG: 50 INJECTION, SOLUTION INTRAVENOUS at 12:59

## 2022-12-07 RX ADMIN — NEOSTIGMINE METHYLSULFATE 5 MG: 1 INJECTION, SOLUTION INTRAVENOUS at 14:31

## 2022-12-07 RX ADMIN — IRBESARTAN 150 MG: 150 TABLET ORAL at 21:09

## 2022-12-07 RX ADMIN — LIDOCAINE HYDROCHLORIDE 50 MG: 10 INJECTION, SOLUTION EPIDURAL; INFILTRATION; INTRACAUDAL; PERINEURAL at 12:59

## 2022-12-07 RX ADMIN — ERTAPENEM SODIUM 1 G: 1 INJECTION, POWDER, LYOPHILIZED, FOR SOLUTION INTRAMUSCULAR; INTRAVENOUS at 12:07

## 2022-12-07 RX ADMIN — FENTANYL CITRATE 50 MCG: 50 INJECTION INTRAMUSCULAR; INTRAVENOUS at 15:49

## 2022-12-07 RX ADMIN — FENTANYL CITRATE 25 MCG: 50 INJECTION INTRAMUSCULAR; INTRAVENOUS at 15:13

## 2022-12-07 RX ADMIN — FENTANYL CITRATE 50 MCG: 50 INJECTION INTRAMUSCULAR; INTRAVENOUS at 16:06

## 2022-12-07 RX ADMIN — GLYCOPYRROLATE 0.8 MG: 0.2 INJECTION, SOLUTION INTRAMUSCULAR; INTRAVENOUS at 14:31

## 2022-12-07 RX ADMIN — HYDROMORPHONE HYDROCHLORIDE 0.4 MG: 0.2 INJECTION, SOLUTION INTRAMUSCULAR; INTRAVENOUS; SUBCUTANEOUS at 15:36

## 2022-12-07 RX ADMIN — ACETAMINOPHEN 975 MG: 325 TABLET, FILM COATED ORAL at 12:36

## 2022-12-07 RX ADMIN — FENTANYL CITRATE 100 MCG: 50 INJECTION, SOLUTION INTRAMUSCULAR; INTRAVENOUS at 12:59

## 2022-12-07 RX ADMIN — DEXAMETHASONE SODIUM PHOSPHATE 4 MG: 4 INJECTION, SOLUTION INTRA-ARTICULAR; INTRALESIONAL; INTRAMUSCULAR; INTRAVENOUS; SOFT TISSUE at 13:07

## 2022-12-07 RX ADMIN — FENTANYL CITRATE 25 MCG: 50 INJECTION INTRAMUSCULAR; INTRAVENOUS at 15:07

## 2022-12-07 RX ADMIN — HALOPERIDOL LACTATE 1 MG: 5 INJECTION, SOLUTION INTRAMUSCULAR at 16:27

## 2022-12-07 RX ADMIN — PROPOFOL 50 MCG/KG/MIN: 10 INJECTION, EMULSION INTRAVENOUS at 13:05

## 2022-12-07 RX ADMIN — SODIUM CHLORIDE, POTASSIUM CHLORIDE, SODIUM LACTATE AND CALCIUM CHLORIDE: 600; 310; 30; 20 INJECTION, SOLUTION INTRAVENOUS at 13:48

## 2022-12-07 RX ADMIN — HYDROMORPHONE HYDROCHLORIDE 0.5 MG: 1 INJECTION, SOLUTION INTRAMUSCULAR; INTRAVENOUS; SUBCUTANEOUS at 12:26

## 2022-12-07 RX ADMIN — HEPARIN SODIUM 5000 UNITS: 5000 INJECTION INTRAVENOUS; SUBCUTANEOUS at 12:37

## 2022-12-07 RX ADMIN — HYDROMORPHONE HYDROCHLORIDE 0.5 MG: 1 INJECTION, SOLUTION INTRAMUSCULAR; INTRAVENOUS; SUBCUTANEOUS at 13:20

## 2022-12-07 RX ADMIN — SODIUM CHLORIDE, POTASSIUM CHLORIDE, SODIUM LACTATE AND CALCIUM CHLORIDE: 600; 310; 30; 20 INJECTION, SOLUTION INTRAVENOUS at 18:02

## 2022-12-07 RX ADMIN — ACETAMINOPHEN 975 MG: 325 TABLET, FILM COATED ORAL at 21:04

## 2022-12-07 ASSESSMENT — ACTIVITIES OF DAILY LIVING (ADL)
ADLS_ACUITY_SCORE: 20
ADLS_ACUITY_SCORE: 18
ADLS_ACUITY_SCORE: 20
ADLS_ACUITY_SCORE: 20
ADLS_ACUITY_SCORE: 18
ADLS_ACUITY_SCORE: 16
ADLS_ACUITY_SCORE: 18

## 2022-12-07 ASSESSMENT — ENCOUNTER SYMPTOMS: DYSRHYTHMIAS: 0

## 2022-12-07 ASSESSMENT — LIFESTYLE VARIABLES: TOBACCO_USE: 1

## 2022-12-07 ASSESSMENT — COPD QUESTIONNAIRES: COPD: 1

## 2022-12-07 NOTE — ANESTHESIA POSTPROCEDURE EVALUATION
Patient: Janet Figueroa    Procedure: Procedure(s):  Loop ileostomy reversal,rectal exam       Anesthesia Type:  General    Note:  Disposition: Outpatient   Postop Pain Control: Uneventful            Sign Out: Well controlled pain   PONV: No   Neuro/Psych: Uneventful            Sign Out: Acceptable/Baseline neuro status   Airway/Respiratory: Uneventful            Sign Out: Acceptable/Baseline resp. status   CV/Hemodynamics: Uneventful            Sign Out: Acceptable CV status; No obvious hypovolemia; No obvious fluid overload   Other NRE: NONE   DID A NON-ROUTINE EVENT OCCUR? No           Last vitals:  Vitals Value Taken Time   /70 12/07/22 1650   Temp 97.7  F (36.5  C) 12/07/22 1530   Pulse 65 12/07/22 1654   Resp 11 12/07/22 1654   SpO2 93 % 12/07/22 1654   Vitals shown include unvalidated device data.    Electronically Signed By: Abdoul Sánchez MD  December 7, 2022  4:55 PM

## 2022-12-07 NOTE — ANESTHESIA PREPROCEDURE EVALUATION
Anesthesia Pre-Procedure Evaluation    Patient: Janet Figueroa   MRN: [id number] : 1950        Procedure : Procedure(s):  Loop ileostomy reversal          Past Medical History:   Diagnosis Date     Arrhythmia     afib     Arthritis     knees     Cerebral artery occlusion with cerebral infarction (H)     TIA  possible with visual field cut in right eye     COPD (chronic obstructive pulmonary disease) (H)      Coronary artery disease      Gastroesophageal reflux disease      Hypertension      Rectal cancer (H)      Renal artery stenosis (H)     due to fibromuscular dysplasia     Renal disease     Stage 3b chronic kidney disease     Retinal detachment     right     Thyroid disease       Past Surgical History:   Procedure Laterality Date     CARDIAC SURGERY      cardioversion for atrial flutter     COLONOSCOPY       coronary artery stent placement       DAVINCI COLECTOMY N/A 2022    Procedure: ROBOTIC ASSISTED LOW ANTERIOR RESECTION WITH DIVERTING LOOP ILEOSTOMY;  Surgeon: Lay Connolly MD;  Location: [facility] OR     ENT SURGERY      TONSILLECTOMY     EYE SURGERY Bilateral     cataracts also surgery for detached retina     RECTAL SURGERY  2022    Dr Connolly, Robotic low anterior resection with diverting loop ileostomy for rectal cancer      Allergies   Allergen Reactions     Ciprofloxacin Hives     Alendronic Acid Other (See Comments)     HUT Comment: severe esophageal reflux, jaw pain, arm pain  severe esophageal reflux, jaw pain, arm pain       Clopidogrel Other (See Comments)     Increased bruising and bloody nose  Increased bruising and bloody nose       Codeine Other (See Comments) and Itching     Digoxin Nausea and Vomiting     Lisinopril Cough and Other (See Comments)     HUT Reaction: Cough       Morphine Nausea and Vomiting     IV morphine     Risedronate      HUT Comment: severe esophageal reflux, jaw and arm pain  severe esophageal reflux, jaw and arm pain       Adhesive Tape  Rash     And telemetry adhesive. Adhesive coloplast products for colostomy cause skin breakdown     Cephalexin Rash     Chromium Rash      Social History     Tobacco Use     Smoking status: Former     Types: Cigarettes     Quit date:      Years since quittin.9     Smokeless tobacco: Never   Substance Use Topics     Alcohol use: Yes     Comment: 10 drinks per week approx      Wt Readings from Last 1 Encounters:   22 86.2 kg (190 lb)        Anesthesia Evaluation            ROS/MED HX  ENT/Pulmonary:     (+) tobacco use, Past use, COPD,     Neurologic:     (+) CVA,     Cardiovascular: Comment: Stress Test: Date:  Results:  Impressions   Abnormal study after pharmacologic stress. There was a small   amount of ischemia involving the anteroapical wall in the   territory of the left anterior descending coronary artery.   Left ventricular systolic function was normal. Based on   this study, the annual cardiovascular mortality rate is low   (less than 1%).      ECG Reviewed: Date: Results:     Cath: Date:  Results:  Study conclusions   IMPRESSIONS: Proximal RCA has mild-moderate disease (40%),   this lesion is heavily calcified. Mid RCA stent is patent.   Mid LAD has 50% stenosis. FFR of this vessel is   non-significant (0.84).   Principal OM has a 50% ostial stenosis. FFR of this vessel   was non-significant (0.86).   LVEDP was severely eelvated at 29 mm Hg.     (+) hypertension--CAD --stent-2019. 1 Drug Eluting Stent.  (-) CHF, arrhythmias and pulmonary hypertension   METS/Exercise Tolerance:     Hematologic:    (-) anemia   Musculoskeletal:   (+) arthritis,     GI/Hepatic:     (+) GERD,     Renal/Genitourinary:     (+) renal disease, type: CRI, Pt does not require dialysis,     Endo:     (+) thyroid problem, hypothyroidism, Obesity,     Psychiatric/Substance Use:    (-) psychiatric history   Infectious Disease:       Malignancy:   (+) Malignancy, History of GI.GI CA Remission status post.         Other:            Physical Exam    Airway        Mallampati: II   TM distance: > 3 FB   Neck ROM: full   Mouth opening: > 3 cm    Respiratory Devices and Support         Dental           Cardiovascular   cardiovascular exam normal          Pulmonary   pulmonary exam normal            Other findings: Lab Test        05/16/22     05/13/22     05/10/22     05/09/22     05/07/22     05/05/22     05/04/22     05/03/22                       0646          0731          0714          0833          1243          0710          0715          1113          WBC           --          6.5           --           --          9.9           --           --          7.4           HGB           --          11.0*         --           --          13.0          --           --          14.8          MCV           --          100           --           --          102*          --           --          104*          PLT          395          301          391           --          332  332     --            < >        389           INR          1.01          --           --          0.99          --          0.98          --           --            < > = values in this interval not displayed.                  Lab Test        05/17/22     05/16/22     05/16/22     05/15/22     05/15/22     05/14/22     05/14/22                       0507          1711          0646          0624          0607          1713          0819          NA           138           --          138           --           --           --          139           POTASSIUM    4.1           --          4.1           --          3.6           --          3.6           CHLORIDE     109           --          110*          --           --           --          106           CO2          23            --          21            --           --           --          27            BUN          32*           --          29            --           --           --          17             CR           0.94          --          0.91          --           --           --          0.80          ANIONGAP     6             --          7             --           --           --          6             GISELLE          8.3*          --          8.1*          --           --           --          8.3*          GLC          92           108*         101*           < >         --            < >        108*           < > = values in this interval not displayed.                   OUTSIDE LABS:  CBC:   Lab Results   Component Value Date    WBC 6.5 05/13/2022    WBC 9.9 05/07/2022    HGB 11.0 (L) 05/13/2022    HGB 13.0 05/07/2022    HCT 33.1 (L) 05/13/2022    HCT 39.4 05/07/2022     05/16/2022     05/13/2022     BMP:   Lab Results   Component Value Date     05/17/2022     05/16/2022    POTASSIUM 4.1 05/17/2022    POTASSIUM 4.1 05/16/2022    CHLORIDE 109 05/17/2022    CHLORIDE 110 (H) 05/16/2022    CO2 23 05/17/2022    CO2 21 05/16/2022    BUN 32 (H) 05/17/2022    BUN 29 05/16/2022    CR 0.94 05/17/2022    CR 0.91 05/16/2022    GLC 92 05/17/2022     (H) 05/16/2022     COAGS:   Lab Results   Component Value Date    INR 1.01 05/16/2022     POC: No results found for: BGM, HCG, HCGS  HEPATIC:   Lab Results   Component Value Date    ALBUMIN 2.4 (L) 05/16/2022    PROTTOTAL 5.8 (L) 05/16/2022    ALT 42 05/16/2022    AST 29 05/16/2022    ALKPHOS 90 05/16/2022    BILITOTAL 0.5 05/16/2022     OTHER:   Lab Results   Component Value Date    LACT 0.9 05/09/2022    GISELLE 8.3 (L) 05/17/2022    PHOS 3.3 05/17/2022    MAG 2.4 (H) 05/17/2022       Anesthesia Plan    ASA Status:  3      Anesthesia Type: General.     - Airway: ETT   Induction: Propofol.   Maintenance: Balanced.        Consents    Anesthesia Plan(s) and associated risks, benefits, and realistic alternatives discussed. Questions answered and patient/representative(s) expressed understanding.    - Discussed:     - Discussed with:  Patient       - Extended Intubation/Ventilatory Support Discussed: No.      - Patient is DNR/DNI Status: No    Use of blood products discussed: No .     Postoperative Care    Pain management: IV analgesics, Oral pain medications.   PONV prophylaxis: Ondansetron (or other 5HT-3), Background Propofol Infusion     Comments:                Enrike Wylie MD

## 2022-12-07 NOTE — INTERVAL H&P NOTE
"I have reviewed the surgical (or preoperative) H&P that is linked to this encounter, and examined the patient. There are no significant changes    Clinical Conditions Present on Arrival:  Clinically Significant Risk Factors Present on Admission                  # Drug Induced Coagulation Defect: home medication list includes an anticoagulant medication   # Obesity: Estimated body mass index is 33.66 kg/m  as calculated from the following:    Height as of this encounter: 1.6 m (5' 3\").    Weight as of this encounter: 86.2 kg (190 lb).       "

## 2022-12-07 NOTE — BRIEF OP NOTE
[facility]    Brief Operative Note    Pre-operative diagnosis: Malignant neoplasm of rectum (H) [C20]  Post-operative diagnosis Same as pre-operative diagnosis    Procedure: Procedure(s):  Loop ileostomy reversal,rectal exam  Surgeon: Surgeon(s) and Role:     * Lay Connolly MD - Primary  Anesthesia: General   Estimated Blood Loss: Less than 10 ml    Drains: None  Specimens:   ID Type Source Tests Collected by Time Destination   1 : ileostomy Tissue Small Intestine, Ileum SURGICAL PATHOLOGY EXAM Lay Connolly MD 12/7/2022  2:04 PM      Findings:   Expected anatomy of loop ileostomy .  Complications: None.  Implants: * No implants in log *        Condition on discharge from OR: Satisfactory    Antonella Nieves PA-C   [facility]   [phone].        ADDENDUM:    PATIENT DATA  Indicate Y or N:  Home O2 No  Hemodialysis  No  Transplant patient  No  Cirrhosis  No  Steroids in last 30 days  No  Immunomodulators in last 30 days  No  Anticoagulation at time of surgery  Yes   List medication Eliquis  Prior abdominal surgery  Yes  Pelvic irradiation  No    Albumin within 30 days if known - unknown  Hgb within 30 days if known 13.7  Cr within 30 days if known 1.15    Body mass index is 33.66 kg/m .      OR DATA  Emergent  No   <24 hours  No   <1 week  No  Bowel Prep No  Antibiotics  Yes  DVT prophylaxis    Heparin  Yes   SCD  Yes   None  No  Drain  No  ASA (1,2,3,4) 3  OR time (min) 72  Stents  No  Transfuse >/= 2U  No  Anastomosis   Stapled  Yes   Handsewn  No  Leak Test    Positive  No   Negative  No   Not done  Yes

## 2022-12-07 NOTE — ANESTHESIA PROCEDURE NOTES
Airway       Patient location during procedure: OR  Staff -        Anesthesiologist:  Enrike Wylie MD       CRNA: Severson, Dean Dennis, APRN CRNA       Performed By: CRNA  Consent for Airway        Urgency: elective  Indications and Patient Condition       Indications for airway management: kalyan-procedural and airway protection       Induction type:intravenous       Mask difficulty assessment: 1 - vent by mask    Final Airway Details       Final airway type: endotracheal airway       Successful airway: ETT - single  Endotracheal Airway Details        ETT size (mm): 7.0       Cuffed: yes       Successful intubation technique: direct laryngoscopy       DL Blade Type: Bui 2       Grade View of Cords: 2       Adjucts: stylet       Position: Center       Measured from: lips       Secured at (cm): 22       Bite block used: Soft    Post intubation assessment        Placement verified by: capnometry, equal breath sounds and chest rise        Number of attempts at approach: 1       Number of other approaches attempted: 0       Secured with: plastic tape       Ease of procedure: easy       Dentition: Intact

## 2022-12-07 NOTE — OP NOTE
Procedure Date: 12/07/2022    PREOPERATIVE DIAGNOSIS:  Unwanted ileostomy above a low anterior resection for rectal cancer.    POSTOPERATIVE DIAGNOSIS:  Unwanted ileostomy above a low anterior resection for rectal cancer.    PROCEDURE:  Open ileostomy reversal.    SURGEON:  Lay Connolly MD    ASSISTANT:  Antonella Nieves PA-C and Tosha Smith PA-C    INDICATIONS FOR PROCEDURE:  Janet is a 72-year-old woman who  underwent a low anterior resection with diverting loop ileostomy on 05/28/2022.  She had a T1 N0 adenocarcinoma.  She had a slow recovery postop.  She now comes in for reversal of her ileostomy.  She was briefed on the risks of bleeding, infection, anastomotic leak that might require reoperation and return to the stoma, and other risks associated with major abdominal surgery and general anesthesia including but not limited to DVT, PE, heart attack, stroke, other cardiopulmonary events, and even death.  She is on Eliquis preop.  It was held 3 days.    She understood and wished to proceed.    FINDINGS:  There was expected anatomy of a diverting loop ileostomy.  Palpation of the rectal anastomosis revealed a patent colorectal anastomosis without evidence of stenosis or disruption.    DESCRIPTION OF PROCEDURE:  After informed consent was obtained, the patient was taken to the operating room, positioned on the operating table in supine position and was intubated.  The arms were padded on arm boards.  The abdomen was prepped and draped in the usual fashion, we began with a timeout and then proceeded to make a circular incision around the stoma at the mucocutaneous junction.  The bowel was elevated up out of the soft tissue and freed up from the fascia circumferentially.  Once we had freed this up, we had excellent length and decided to resect about 2 cm of bowel on each side, which would encompass the subcutaneous portion of her stoma.  A window was made in the mesentery and the mesentery was clamped and  tied.  We then created a side-to-side functional end-to-end anastomosis, making an enterotomy on each side where we intended to divide.  The common channel was created with a KEERTHI-75 blue load and the common enterotomy was closed with a TA-90.  The corners, crotch and crossing staple line were oversewn and we assessed for hemostasis, which was excellent.  The anastomosis was dropped back into the abdomen without difficulty.  We irrigated and then reapproximated the fascia with several figure-of-eight with 0 looped PDS.  Subcutaneous wound was irrigated and a pursestring was created at the level of the skin using an 0 Vicryl.  This was injected with 30 mL of 0.25% Marcaine and gently packed with moist gauze.  She was awakened from anesthesia, returned to the [facility] in stable condition.    COUNTS:  Sponge and needle counts were correct at the end the case.    SPECIMENS:  Ileostomy trim.    Lay Connolly MD        D: 2022   T: 2022   MT: WALLY    Name:     ED CIFUENTES  MRN:      [id number]        Account:        [id number]   :      1950           Procedure Date: 2022     Document: [id number]    cc:  MD Lay Jurado MD

## 2022-12-08 LAB
ANION GAP SERPL CALCULATED.3IONS-SCNC: 10 MMOL/L (ref 7–15)
BUN SERPL-MCNC: 9.8 MG/DL (ref 8–23)
CALCIUM SERPL-MCNC: 8.6 MG/DL (ref 8.8–10.2)
CHLORIDE SERPL-SCNC: 103 MMOL/L (ref 98–107)
CREAT SERPL-MCNC: 0.78 MG/DL (ref 0.51–0.95)
DEPRECATED HCO3 PLAS-SCNC: 24 MMOL/L (ref 22–29)
GFR SERPL CREATININE-BSD FRML MDRD: 79 ML/MIN/1.73M2
GLUCOSE SERPL-MCNC: 109 MG/DL (ref 70–99)
HGB BLD-MCNC: 11.5 G/DL (ref 11.7–15.7)
POTASSIUM SERPL-SCNC: 3.5 MMOL/L (ref 3.4–5.3)
SODIUM SERPL-SCNC: 137 MMOL/L (ref 136–145)

## 2022-12-08 PROCEDURE — 120N000001 HC R&B MED SURG/OB

## 2022-12-08 PROCEDURE — 250N000013 HC RX MED GY IP 250 OP 250 PS 637: Performed by: ANESTHESIOLOGY

## 2022-12-08 PROCEDURE — 36415 COLL VENOUS BLD VENIPUNCTURE: CPT | Performed by: COLON & RECTAL SURGERY

## 2022-12-08 PROCEDURE — 250N000011 HC RX IP 250 OP 636: Performed by: COLON & RECTAL SURGERY

## 2022-12-08 PROCEDURE — 85018 HEMOGLOBIN: CPT | Performed by: COLON & RECTAL SURGERY

## 2022-12-08 PROCEDURE — 250N000013 HC RX MED GY IP 250 OP 250 PS 637: Performed by: PHYSICIAN ASSISTANT

## 2022-12-08 PROCEDURE — 80048 BASIC METABOLIC PNL TOTAL CA: CPT | Performed by: COLON & RECTAL SURGERY

## 2022-12-08 PROCEDURE — 250N000013 HC RX MED GY IP 250 OP 250 PS 637: Performed by: COLON & RECTAL SURGERY

## 2022-12-08 PROCEDURE — 99223 1ST HOSP IP/OBS HIGH 75: CPT | Performed by: PHYSICIAN ASSISTANT

## 2022-12-08 RX ORDER — TORSEMIDE 10 MG/1
20 TABLET ORAL ONCE
Status: COMPLETED | OUTPATIENT
Start: 2022-12-08 | End: 2022-12-08

## 2022-12-08 RX ADMIN — ESCITALOPRAM OXALATE 20 MG: 20 TABLET ORAL at 08:27

## 2022-12-08 RX ADMIN — CARVEDILOL 12.5 MG: 12.5 TABLET, FILM COATED ORAL at 18:01

## 2022-12-08 RX ADMIN — OXYCODONE HYDROCHLORIDE 10 MG: 5 TABLET ORAL at 20:41

## 2022-12-08 RX ADMIN — CARVEDILOL 12.5 MG: 12.5 TABLET, FILM COATED ORAL at 08:27

## 2022-12-08 RX ADMIN — IRBESARTAN 150 MG: 150 TABLET ORAL at 20:41

## 2022-12-08 RX ADMIN — OXYCODONE HYDROCHLORIDE 5 MG: 5 TABLET ORAL at 18:01

## 2022-12-08 RX ADMIN — ATORVASTATIN CALCIUM 80 MG: 40 TABLET, FILM COATED ORAL at 20:41

## 2022-12-08 RX ADMIN — ENOXAPARIN SODIUM 40 MG: 40 INJECTION SUBCUTANEOUS at 08:27

## 2022-12-08 RX ADMIN — TORSEMIDE 20 MG: 10 TABLET ORAL at 10:00

## 2022-12-08 RX ADMIN — ACETAMINOPHEN 975 MG: 325 TABLET, FILM COATED ORAL at 11:53

## 2022-12-08 RX ADMIN — AMIODARONE HYDROCHLORIDE 200 MG: 200 TABLET ORAL at 08:27

## 2022-12-08 RX ADMIN — HYDROMORPHONE HYDROCHLORIDE 0.2 MG: 0.2 INJECTION, SOLUTION INTRAMUSCULAR; INTRAVENOUS; SUBCUTANEOUS at 00:18

## 2022-12-08 RX ADMIN — ACETAMINOPHEN 975 MG: 325 TABLET, FILM COATED ORAL at 20:41

## 2022-12-08 RX ADMIN — OXYCODONE HYDROCHLORIDE 5 MG: 5 TABLET ORAL at 02:52

## 2022-12-08 RX ADMIN — LEVOTHYROXINE SODIUM 25 MCG: 0.03 TABLET ORAL at 08:27

## 2022-12-08 RX ADMIN — OXYCODONE HYDROCHLORIDE 5 MG: 5 TABLET ORAL at 09:38

## 2022-12-08 RX ADMIN — ACETAMINOPHEN 975 MG: 325 TABLET, FILM COATED ORAL at 04:48

## 2022-12-08 ASSESSMENT — ACTIVITIES OF DAILY LIVING (ADL)
ADLS_ACUITY_SCORE: 20
ADLS_ACUITY_SCORE: 22
ADLS_ACUITY_SCORE: 20
ADLS_ACUITY_SCORE: 22
ADLS_ACUITY_SCORE: 20

## 2022-12-08 NOTE — CONSULTS
Hospitalist Consultation      Janet Figueroa MRN# [id number]   YOB: 1950 Age: 72 year old   Date of Admission: 12/7/2022     Requesting Physician:  Dr. Connolly  Reason for consult: Medical management           Assessment and Plan:   This patient is a 72 year old female with a PMH significant for paroxysmal A. fib on DOAC, chronic diastolic heart failure, CAD, history of fibromuscular dysplasia, COPD, HTN, CKD stage III, GERD and history of TIA who is POD 1 s/p loop ileostomy reversal.      # S/p loop ileostomy reversal  -History of low anterior resection with diverting loop ileostomy on 5/28 due to adenocarcinoma of the rectum  -Procedure was uncomplicated  -Pain is adequately controlled  -She is tolerating a clear liquid diet and will likely advance to full liquids today without nausea or vomiting  -She has yet to pass gas  -No complaints of shortness of breath, cough or chest discomfort  -Pain control, diet and DVT prophylaxis per colorectal    #Paroxysmal A. Fib  -Currently in normal sinus rhythm, patient can tell when she is in A. Fib  -Continue PTA amiodarone and carvedilol  -Holding PTA Eliquis until colorectal team is comfortable restarting    #History of chronic diastolic heart failure  -Last echo in August 2022 showed EF 55% with mild biatrial enlargement, moderate mitral regurgitation  -PTA has been on torsemide which was recently increased from 20 mg daily to 30 mg daily  -Appears euvolemic on exam but patient is concerned about 6 pound weight gain since preoperative weight  -We will restart torsemide at 20 mg today and reassess tomorrow before given    #History of coronary artery disease  #HTN  -History of stenting to the mid RCA and proximal left circumflex in July 2008  -Continue irbesartan    #HLP  -Continue atorvastatin    #Hypothyroidism  -Continue levothyroxine    #History of COPD  -Lung sound clear  -Continue Incruse Ellipta and albuterol which the patient  brought    #Anxiety  -Continue Lexapro    #History of fibromuscular dysplasia      DVT Prophylaxis: on Lovenx as per primary  D/C planning: To home once medically ready             History of Present Illness:   This patient is a 72 year old female who is POD 1 s/p  loop ileostomy reversal.   Intra-op report reviewed and showed no intra-op complications.   I/o's reviewed, Currently net positive with good UOP since OR. Hgb 11.5 without complaint of shortness of breath, chest discomfort, lightheadedness or dizziness.  Overnight did pretty well without complaints. Vital signs stable.  Tolerating clear liquid diet without difficulty.  She is concerned about a 6 pound weight gain from yesterday preop to now.  Other medical problems are stable without recent illness.                 Past Medical History:     Past Medical History:   Diagnosis Date     Arrhythmia     afib     Arthritis     knees     Cerebral artery occlusion with cerebral infarction (H) 2013    TIA  possible with visual field cut in right eye     COPD (chronic obstructive pulmonary disease) (H)      Coronary artery disease      Gastroesophageal reflux disease      Hypertension      Rectal cancer (H)      Renal artery stenosis (H)     due to fibromuscular dysplasia     Renal disease     Stage 3b chronic kidney disease     Retinal detachment     right     Thyroid disease                Past Surgical History:     Past Surgical History:   Procedure Laterality Date     CARDIAC SURGERY  2022    cardioversion for atrial flutter     COLONOSCOPY       coronary artery stent placement  2019     DAVINCI COLECTOMY N/A 04/28/2022    Procedure: ROBOTIC ASSISTED LOW ANTERIOR RESECTION WITH DIVERTING LOOP ILEOSTOMY;  Surgeon: Lay Connolly MD;  Location:  OR     ENT SURGERY      TONSILLECTOMY     EYE SURGERY Bilateral     cataracts also surgery for detached retina     RECTAL SURGERY  04/2022    Dr Connolly, Robotic low anterior resection with diverting loop ileostomy  for rectal cancer                 Social History:     Social History     Tobacco Use     Smoking status: Former     Types: Cigarettes     Quit date:      Years since quittin.9     Smokeless tobacco: Never   Substance Use Topics     Alcohol use: Yes     Comment: 10 drinks per week approx     Drug use: Never                 Family History:     family history includes Coronary Artery Disease in her father and mother; Hypertension in her brother and sister; Kidney failure in her father; Liver Cancer in her sister; Lung Cancer in her mother.              Allergies:     Allergies   Allergen Reactions     Ciprofloxacin Hives     Alendronic Acid Other (See Comments)     HUT Comment: severe esophageal reflux, jaw pain, arm pain  severe esophageal reflux, jaw pain, arm pain       Clopidogrel Other (See Comments)     Increased bruising and bloody nose  Increased bruising and bloody nose       Codeine Other (See Comments) and Itching     Digoxin Nausea and Vomiting     Lisinopril Cough and Other (See Comments)     HUT Reaction: Cough       Morphine Nausea and Vomiting     IV morphine     Risedronate      HUT Comment: severe esophageal reflux, jaw and arm pain  severe esophageal reflux, jaw and arm pain       Adhesive Tape Rash     And telemetry adhesive. Adhesive coloplast products for colostomy cause skin breakdown     Cephalexin Rash     Chromium Rash             Medications:     Prior to Admission medications    Medication Sig Last Dose Taking? Auth Provider Long Term End Date   acetaminophen (TYLENOL) 500 MG tablet Take 500-1,000 mg by mouth every 6 hours as needed for mild pain  Yes Reported, Patient     albuterol (PROAIR HFA/PROVENTIL HFA/VENTOLIN HFA) 108 (90 Base) MCG/ACT inhaler Inhale 2 puffs into the lungs 4 times daily as needed  Yes Reported, Patient Yes    amiodarone (PACERONE) 200 MG tablet Take 1 tablet by mouth daily 2022 Yes Reported, Patient Yes    amLODIPine (NORVASC) 10 MG tablet Take 10 mg by  "mouth daily  Yes Reported, Patient Yes 12/1/22   apixaban ANTICOAGULANT (ELIQUIS) 5 MG tablet Take 5 mg by mouth 2 times daily 12/4/2022 Yes Reported, Patient     atorvastatin (LIPITOR) 80 MG tablet Take 80 mg by mouth At Bedtime 12/6/2022 Yes Reported, Patient Yes    carvedilol (COREG) 12.5 MG tablet Take 12.5 mg by mouth 2 times daily (with meals) 12/7/2022 Yes Reported, Patient Yes 12/7/22   escitalopram (LEXAPRO) 20 MG tablet Take 20 mg by mouth daily 12/7/2022 Yes Reported, Patient Yes 12/30/22   irbesartan (AVAPRO) 300 MG tablet Take 150 mg by mouth daily 12/6/2022 Yes Reported, Patient Yes    levothyroxine (SYNTHROID/LEVOTHROID) 25 MCG tablet Take 1 tablet by mouth daily 12/7/2022 Yes Reported, Patient Yes 1/4/23   torsemide (DEMADEX) 20 MG tablet Take 30 mg by mouth daily 12/6/2022 Yes Reported, Patient No 3/28/23   umeclidinium (INCRUSE ELLIPTA) 62.5 MCG/INH inhaler Inhale 1 puff into the lungs daily  Yes Reported, Patient                 Review of Systems:     A comprehensive greater than 10 system review of systems was carried out.  Pertinent positives and negatives are noted above.  Otherwise negative for contributory info.            Physical Exam:   Vitals were reviewed  Blood pressure (!) 147/63, pulse 59, temperature 97.8  F (36.6  C), temperature source Oral, resp. rate 12, height 1.6 m (5' 3\"), weight 88.9 kg (196 lb), SpO2 99 %.  Exam:    GENERAL:  Comfortable.  PSYCH: pleasant, oriented, No acute distress.  HEENT:  PERRLA. Normal conjunctiva, normal hearing, nasal mucosa and Oropharynx are normal.  NECK:  Supple, no neck vein distention, adenopathy or bruits, normal thyroid.  HEART:  Normal S1, S2 with no murmur, no pericardial rub, S3 or S4.  LUNGS:  Clear to auscultation, normal Respiratory effort.  ABDOMEN:  Soft, no hepatosplenomegaly, normal bowel sounds.  EXTREMITIES:  No pedal edema, +2 pulses bilateral and equal.  SKIN:  Dry to touch, No rash, wound or ulcerations.  NEUROLOGIC:  Nonfocal " with normal cranial nerve and motor power and sensation.            Data:   Past 24 hours labs, studies, and imaging were reviewed.  Recent Labs   Lab 12/08/22 0649 12/07/22  1838   HGB 11.5*  --    PLT  --  266     Recent Labs   Lab 12/08/22 0649 12/07/22  1755     --    POTASSIUM 3.5  --    CHLORIDE 103  --    CO2 24  --    ANIONGAP 10  --    *  --    BUN 9.8  --    CR 0.78 0.88   GFRESTIMATED 79 69   GISELLE 8.6*  --        Kathy Clements PA-C    Pt discussed with Dr. Barton who agrees with the care as discussed above.

## 2022-12-08 NOTE — PLAN OF CARE
Goal Outcome Evaluation:  End of Shift Summary  For vital signs and complete assessments, please see documentation flowsheets.     Pertinent assessments: VSS, aside from elevated BPs. O2 weaned to 2LPM, tolerating with sats in mid-90's. C/o mild-moderate abd pain, scheduled Tylenol, declines further intervention. Stoma site leaking, reinforced with ABD. BS hypoactive, -gas. Denies nausea, tolerating clears. Voiding adequately.    Major Shift Events: Admitted from PACU  Treatment Plan: Pain control, diet advancement  Bedside Nurse: Taniya Coon RN

## 2022-12-08 NOTE — PROGRESS NOTES
COLON & RECTAL SURGERY  PROGRESS NOTE    December 8, 2022  Post-op Day # 1    SUBJECTIVE:  Patient is feeling well.  Tolerating clear liquids.  No nausea or vomiting.  Has had some gas pain and some pain with movement.  No stool or gas yet.    OBJECTIVE:  Temp:  [97.2  F (36.2  C)-98.4  F (36.9  C)] 97.8  F (36.6  C)  Pulse:  [59-71] 59  Resp:  [10-21] 12  BP: (112-177)/() 147/63  SpO2:  [89 %-99 %] 99 %    Intake/Output Summary (Last 24 hours) at 12/8/2022 0924  Last data filed at 12/8/2022 0700  Gross per 24 hour   Intake 3041 ml   Output 900 ml   Net 2141 ml       GENERAL:  Awake, alert, no acute distress, lying in bed  HEAD: Nomocephalic atraumatic  SCLERA: anicteric  EXTREMITIES: warm and well perfused  ABDOMEN:  Soft, appropriately tender, non-distended, no rebound or guarding, no peritoneal signs  INCISION:  C/d/i, dressing changed    LABS:  Lab Results   Component Value Date    WBC 6.5 05/13/2022     Lab Results   Component Value Date    HGB 11.5 12/08/2022     Lab Results   Component Value Date    HCT 33.1 05/13/2022     Lab Results   Component Value Date     12/07/2022     Last Basic Metabolic Panel:  Lab Results   Component Value Date     12/08/2022      Lab Results   Component Value Date    POTASSIUM 3.5 12/08/2022    POTASSIUM 4.1 05/17/2022     Lab Results   Component Value Date    CHLORIDE 103 12/08/2022    CHLORIDE 109 05/17/2022     Lab Results   Component Value Date    GISELLE 8.6 12/08/2022     Lab Results   Component Value Date    CO2 24 12/08/2022    CO2 23 05/17/2022     Lab Results   Component Value Date    BUN 9.8 12/08/2022    BUN 32 05/17/2022     Lab Results   Component Value Date    CR 0.78 12/08/2022     Lab Results   Component Value Date     12/08/2022    GLC 92 05/17/2022       ASSESSMENT/PLAN:Janet is a 73 yo female POD#1 s/p loop ileostomy reversal.     - Full liquids  - Encourage ambulation  - Pain management as needed, minimize narcotics  - Daily dressing  changes with dry gauze  - Lovenox for dvt ppx  - Hold Eliquis  - Await hospitalist consult to assist with management of comorbidities      Disposition: Expected discharge in 2-3 days.  Barriers to discharge: Tolerating low fiber diet, pain controlled with oral meds, return of bowel function.      For questions/paging, please contact the CRS office at [phone].    Antonella Nieves PA-C  [facility]  Phone: [phone]    Colon and Rectal Surgery Attending Note    Patient seen and examined independently.  Agree with above assessment and plan.  Doing well.   abd soft, old stoma site with slight bloody  Drainage.   Plan  Fulls  SLIV  encourge ambulation  Await bowel function.    Lay Connolly MD  [facility]  [address] [place] [address], [address]  [place] [address]  T: [phone]  F: [phone]   [email or web address]

## 2022-12-08 NOTE — PLAN OF CARE
Goal Outcome Evaluation:         End of Shift Summary  For vital signs and complete assessments, please see documentation flowsheets.     Pertinent assessments: Alert and oriented. Pain controlled with PRN Dilaudid and oxycodone. Ambulated to BR with SBA, using IV pole. VSS. Sats drop to 88% on RA when sleeping. Placed on 2L overnight. Dressing with small amount of shadowing, reinforced. Bowel sounds hypoactive, no gas yet. Patient complains of some gas pain.     Major Shift Events: none  Treatment Plan: Pain control, diet advancement  Bedside Nurse: Jammie Guaman RN

## 2022-12-08 NOTE — PHARMACY-ADMISSION MEDICATION HISTORY
PTA meds completed by pre-admitting nurse ( Marly Osei RN ). No further clarifications required by pharmacy.    Prior to Admission medications    Medication Sig Last Dose Taking? Auth Provider Long Term End Date   acetaminophen (TYLENOL) 500 MG tablet Take 500-1,000 mg by mouth every 6 hours as needed for mild pain  Yes Reported, Patient     albuterol (PROAIR HFA/PROVENTIL HFA/VENTOLIN HFA) 108 (90 Base) MCG/ACT inhaler Inhale 2 puffs into the lungs 4 times daily as needed  Yes Reported, Patient Yes    amiodarone (PACERONE) 200 MG tablet Take 1 tablet by mouth daily 12/7/2022 Yes Reported, Patient Yes    amLODIPine (NORVASC) 10 MG tablet Take 10 mg by mouth daily  Yes Reported, Patient Yes 12/1/22   apixaban ANTICOAGULANT (ELIQUIS) 5 MG tablet Take 5 mg by mouth 2 times daily 12/4/2022 Yes Reported, Patient     atorvastatin (LIPITOR) 80 MG tablet Take 80 mg by mouth At Bedtime 12/6/2022 Yes Reported, Patient Yes    carvedilol (COREG) 12.5 MG tablet Take 12.5 mg by mouth 2 times daily (with meals) 12/7/2022 Yes Reported, Patient Yes 12/7/22   escitalopram (LEXAPRO) 20 MG tablet Take 20 mg by mouth daily 12/7/2022 Yes Reported, Patient Yes 12/30/22   irbesartan (AVAPRO) 300 MG tablet Take 150 mg by mouth daily 12/6/2022 Yes Reported, Patient Yes    levothyroxine (SYNTHROID/LEVOTHROID) 25 MCG tablet Take 1 tablet by mouth daily 12/7/2022 Yes Reported, Patient Yes 1/4/23   torsemide (DEMADEX) 20 MG tablet Take 30 mg by mouth daily 12/6/2022 Yes Reported, Patient No 3/28/23   umeclidinium (INCRUSE ELLIPTA) 62.5 MCG/INH inhaler Inhale 1 puff into the lungs daily  Yes Reported, Patient

## 2022-12-09 LAB
ANION GAP SERPL CALCULATED.3IONS-SCNC: 11 MMOL/L (ref 7–15)
BUN SERPL-MCNC: 14 MG/DL (ref 8–23)
CALCIUM SERPL-MCNC: 8.7 MG/DL (ref 8.8–10.2)
CHLORIDE SERPL-SCNC: 102 MMOL/L (ref 98–107)
CREAT SERPL-MCNC: 0.97 MG/DL (ref 0.51–0.95)
DEPRECATED HCO3 PLAS-SCNC: 26 MMOL/L (ref 22–29)
GFR SERPL CREATININE-BSD FRML MDRD: 61 ML/MIN/1.73M2
GLUCOSE SERPL-MCNC: 115 MG/DL (ref 70–99)
PATH REPORT.COMMENTS IMP SPEC: NORMAL
PATH REPORT.COMMENTS IMP SPEC: NORMAL
PATH REPORT.FINAL DX SPEC: NORMAL
PATH REPORT.GROSS SPEC: NORMAL
PATH REPORT.MICROSCOPIC SPEC OTHER STN: NORMAL
PATH REPORT.RELEVANT HX SPEC: NORMAL
PHOTO IMAGE: NORMAL
POTASSIUM SERPL-SCNC: 3.8 MMOL/L (ref 3.4–5.3)
SODIUM SERPL-SCNC: 139 MMOL/L (ref 136–145)

## 2022-12-09 PROCEDURE — 250N000013 HC RX MED GY IP 250 OP 250 PS 637: Performed by: COLON & RECTAL SURGERY

## 2022-12-09 PROCEDURE — 250N000011 HC RX IP 250 OP 636: Performed by: COLON & RECTAL SURGERY

## 2022-12-09 PROCEDURE — 80048 BASIC METABOLIC PNL TOTAL CA: CPT | Performed by: PHYSICIAN ASSISTANT

## 2022-12-09 PROCEDURE — 88304 TISSUE EXAM BY PATHOLOGIST: CPT | Mod: 26 | Performed by: PATHOLOGY

## 2022-12-09 PROCEDURE — 250N000013 HC RX MED GY IP 250 OP 250 PS 637: Performed by: INTERNAL MEDICINE

## 2022-12-09 PROCEDURE — 36415 COLL VENOUS BLD VENIPUNCTURE: CPT | Performed by: PHYSICIAN ASSISTANT

## 2022-12-09 PROCEDURE — 99232 SBSQ HOSP IP/OBS MODERATE 35: CPT | Performed by: INTERNAL MEDICINE

## 2022-12-09 PROCEDURE — 250N000013 HC RX MED GY IP 250 OP 250 PS 637: Performed by: ANESTHESIOLOGY

## 2022-12-09 PROCEDURE — 120N000001 HC R&B MED SURG/OB

## 2022-12-09 PROCEDURE — 250N000013 HC RX MED GY IP 250 OP 250 PS 637: Performed by: PHYSICIAN ASSISTANT

## 2022-12-09 RX ORDER — OXYCODONE HYDROCHLORIDE 5 MG/1
5 TABLET ORAL EVERY 6 HOURS PRN
Qty: 15 TABLET | Refills: 0 | Status: SHIPPED | OUTPATIENT
Start: 2022-12-09

## 2022-12-09 RX ORDER — TORSEMIDE 10 MG/1
30 TABLET ORAL DAILY
Status: DISCONTINUED | OUTPATIENT
Start: 2022-12-09 | End: 2022-12-10 | Stop reason: HOSPADM

## 2022-12-09 RX ADMIN — ONDANSETRON 4 MG: 4 TABLET, ORALLY DISINTEGRATING ORAL at 23:54

## 2022-12-09 RX ADMIN — ACETAMINOPHEN 975 MG: 325 TABLET, FILM COATED ORAL at 20:27

## 2022-12-09 RX ADMIN — TORSEMIDE 30 MG: 10 TABLET ORAL at 16:38

## 2022-12-09 RX ADMIN — ACETAMINOPHEN 975 MG: 325 TABLET, FILM COATED ORAL at 05:01

## 2022-12-09 RX ADMIN — AMIODARONE HYDROCHLORIDE 200 MG: 200 TABLET ORAL at 08:00

## 2022-12-09 RX ADMIN — HYDROMORPHONE HYDROCHLORIDE 0.2 MG: 0.2 INJECTION, SOLUTION INTRAMUSCULAR; INTRAVENOUS; SUBCUTANEOUS at 01:52

## 2022-12-09 RX ADMIN — CARVEDILOL 12.5 MG: 12.5 TABLET, FILM COATED ORAL at 17:52

## 2022-12-09 RX ADMIN — OXYCODONE HYDROCHLORIDE 5 MG: 5 TABLET ORAL at 05:01

## 2022-12-09 RX ADMIN — ENOXAPARIN SODIUM 40 MG: 40 INJECTION SUBCUTANEOUS at 08:00

## 2022-12-09 RX ADMIN — ACETAMINOPHEN 975 MG: 325 TABLET, FILM COATED ORAL at 12:25

## 2022-12-09 RX ADMIN — OXYCODONE HYDROCHLORIDE 5 MG: 5 TABLET ORAL at 10:58

## 2022-12-09 RX ADMIN — CARVEDILOL 12.5 MG: 12.5 TABLET, FILM COATED ORAL at 07:59

## 2022-12-09 RX ADMIN — ATORVASTATIN CALCIUM 80 MG: 40 TABLET, FILM COATED ORAL at 21:21

## 2022-12-09 RX ADMIN — LEVOTHYROXINE SODIUM 25 MCG: 0.03 TABLET ORAL at 08:00

## 2022-12-09 RX ADMIN — IRBESARTAN 150 MG: 150 TABLET ORAL at 20:27

## 2022-12-09 RX ADMIN — ESCITALOPRAM OXALATE 20 MG: 20 TABLET ORAL at 08:00

## 2022-12-09 ASSESSMENT — ACTIVITIES OF DAILY LIVING (ADL)
ADLS_ACUITY_SCORE: 20
ADLS_ACUITY_SCORE: 22
ADLS_ACUITY_SCORE: 22
ADLS_ACUITY_SCORE: 20
ADLS_ACUITY_SCORE: 22
ADLS_ACUITY_SCORE: 20
ADLS_ACUITY_SCORE: 22

## 2022-12-09 NOTE — PROGRESS NOTES
A&O, VSS on RA. C/o intermittent abdominal discomfort, pain controlled with  po oxycodone x1 prn. Up Independent, walking halls. BS hypoactive, 1 incontinent loose stool. Saline locked IV. Dressing changed by surgery, CDI. Tolerating low fiber. Showered this AM    Major Shift Events: x1 loose stool    Treatment Plan: Pain control, diet advancement as tolerated, surgical site education for discharge    Bedside Nurse: Re Gold RN

## 2022-12-09 NOTE — PROGRESS NOTES
Hospitalist Medicine Progress Note   [facility]       Janet Figueroa is a 72 year old lady with history of paroxysmal atrial fibrillation on DOAC, chronic diastolic CHF, CAD, fibromyalgia, COPD, hypertension, stage III CKD, GERD, history of TIA who was admitted to [facility] 12/7/2022 with history of rectal cancer and low anterior resection with diverting loop ileostomy 5/28/2022 for a T1N0 adenocarcinoma who came in now for reversal of her ileostomy and underwent open ileostomy reversal by Dr. 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 now s/p reversal of ileostomy done 12/7/2022  Management as per orthopedic service  Is now on low fiber diet with oral pain medications  Lovenox for DVT prophylaxis    Paroxysmal atrial fibrillation  Now in sinus rhythm  Will continue amiodarone and carvedilol  Eliquis will be restarted tomorrow 12/10/2022    Chronic diastolic CHF  LVEF is 55% in the echo done in August 2022  Torsemide will be restarted     Essential hypertension  At home on carvedilol, irbesartan, torsemide and amlodipine  Since the blood pressure is relatively on the lower side of normal will not start amlodipine today      Plan:   Start Eliquis tomorrow as per surgical recommendation  Restart amlodipine at the time of discharge  Check BMP and monitor creatinine and should it be increasing irbesartan will be withheld along with torsemide    Diet: Low Fiber Diet  Diet    DVT Prophylaxis: Enoxaparin (Lovenox) SQ  Gottlieb Catheter: Not present  Code Status: Full Code               The patient's care was discussed with the Patient      Gamaliel Barton MD  Hospitalist Service  [facility]    ______________________________________________________________________    Interval History     Symptoms   Patient had liquid diet  She had a liquid stool    Review of Systems:    Denies any shortness of breath at rest  Denies any chest pain    Data reviewed today: I reviewed all medications, new labs and imaging results over the last 24 hours.     Physical Exam   Vital Signs: Temp: 97.6  F (36.4  C) Temp src: Oral BP: 119/61 Pulse: 66   Resp: 18 SpO2: 97 % O2 Device: Nasal cannula Oxygen Delivery: 2 LPM  Weight: 196 lbs 0 oz      GENERAL: Patient is not in acute distress  HEENT: EOM+,Conjunctiva is clear   NECK:  no Jugular Venous distention  HEART: S1 S2 regular Rate and Rhythm, there is  no murmur,   LUNGS: Respirations are  not laboured, Lungs are  clear to auscultation without Crepitations or Wheezing   ABDOMEN: Soft , there is minimal tenderness ,Bowel Sounds are  Positive incision was not examined  LOWER LIMBS: no  Pedal Edema  Bilaterally   CNS:  Alert,  Oriented x 3, Moving all the Four Limbs     Data   Recent Labs   Lab 12/08/22  0649 12/07/22  1838 12/07/22  1755   HGB 11.5*  --   --    PLT  --  266  --      --   --    POTASSIUM 3.5  --   --    CHLORIDE 103  --   --    CO2 24  --   --    BUN 9.8  --   --    CR 0.78  --  0.88   ANIONGAP 10  --   --    GISELLE 8.6*  --   --    *  --   --          No results found for this or any previous visit (from the past 24 hour(s)).

## 2022-12-09 NOTE — PLAN OF CARE
Pertinent assessments: A&O, VSS on RA. C/o intermittent abdominal discomfort, pain controlled with oxy given x2. Ambulated to BR with SBA, ambulated multiple times in hallway. Dressing CDI. Bowel sounds hypoactive, no gas yet but feeling gas discomfort. PIV - SL    Major Shift Events: Advanced to full liquid diet, tolerating well.    Treatment Plan: Pain control, diet advancement as tolerated, awaiting bowel function to return.    Bedside Nurse: Amelie Choe RN

## 2022-12-09 NOTE — PLAN OF CARE
A&O, VSS on RA. C/o intermittent abdominal discomfort, pain controlled with  po oxycodone prn. SBA in halls, ind in room. Bowel sounds hypoactive,reports passing gas this evening. Saline locked IV. Stoma site shadowed, CDI.     Major Shift Events: +Flatus     Treatment Plan: Pain control, diet advancement as tolerated, awaiting bowel function to return.

## 2022-12-09 NOTE — PROGRESS NOTES
COLON & RECTAL SURGERY  PROGRESS NOTE    December 9, 2022  Post-op Day # 2    SUBJECTIVE:  Patient doing well.  Pain controlled.  +gas.  +BM.  Tolerating full liquids.  She is going to try an omelette this morning.     OBJECTIVE:  Temp:  [97.6  F (36.4  C)-98.9  F (37.2  C)] 97.6  F (36.4  C)  Pulse:  [55-68] 66  Resp:  [18-20] 18  BP: (118-131)/(43-67) 119/61  SpO2:  [97 %] 97 %    Intake/Output Summary (Last 24 hours) at 12/9/2022 0910  Last data filed at 12/8/2022 2036  Gross per 24 hour   Intake 400 ml   Output 2225 ml   Net -1825 ml     GENERAL:  Awake, alert, no acute distress, lying in bed  HEAD: Nomocephalic atraumatic  SCLERA: anicteric  EXTREMITIES: warm and well perfused  ABDOMEN:  Soft, appropriately tender, non-distended, no rebound or guarding, no peritoneal signs  INCISION:  Wound clean, dressing changed    LABS:  Lab Results   Component Value Date    WBC 6.5 05/13/2022     Lab Results   Component Value Date    HGB 11.5 12/08/2022     Lab Results   Component Value Date    HCT 33.1 05/13/2022     Lab Results   Component Value Date     12/07/2022     Last Basic Metabolic Panel:  Lab Results   Component Value Date     12/08/2022      Lab Results   Component Value Date    POTASSIUM 3.5 12/08/2022    POTASSIUM 4.1 05/17/2022     Lab Results   Component Value Date    CHLORIDE 103 12/08/2022    CHLORIDE 109 05/17/2022     Lab Results   Component Value Date    GISELLE 8.6 12/08/2022     Lab Results   Component Value Date    CO2 24 12/08/2022    CO2 23 05/17/2022     Lab Results   Component Value Date    BUN 9.8 12/08/2022    BUN 32 05/17/2022     Lab Results   Component Value Date    CR 0.78 12/08/2022     Lab Results   Component Value Date     12/08/2022    GLC 92 05/17/2022       ASSESSMENT/PLAN:  73 yo female POD#2 s/p loop ileostomy reversal.      - Low fiber diet  - Encourage ambulation  - Pain management as needed, minimize narcotics  - Daily dressing changes with dry gauze and as needed  when soiled  - Lovenox for dvt ppx  - Hold Eliquis  - Appreciate hospitalist assistance with management of comorbidities      Disposition: Expected discharge in 1 day.  Barriers to discharge: Tolerating low fiber diet.    For questions/paging, please contact the CRS office at [phone].    Antonella Nieves PA-C  [facility]  Phone: [phone]      Colon and Rectal Surgery Attending Note    Patient seen and examined independently.  Agree with above assessment and plan.  POD #2 form ileostomy reversal above LAR for low rectal cancer.   + loose stool, then a smaller formed stool. No bleeding.  Tolerating low fiber diet, voiding well. Pain controlled with oxy.   abd soft stoma site clean with slight bloody drainage.  Plan  Low fiber diet  Oral pain meds  lovenox now but does not need at discharge.   Will resume eliquis for afib tomorrow if labs and wound looks good.     Lay Connolly MD  [facility]  [address], [address]  [place] [address]  T: [phone]  F: [phone]   [email or web address]

## 2022-12-10 VITALS
BODY MASS INDEX: 34.73 KG/M2 | RESPIRATION RATE: 20 BRPM | SYSTOLIC BLOOD PRESSURE: 110 MMHG | OXYGEN SATURATION: 92 % | HEART RATE: 60 BPM | TEMPERATURE: 98.3 F | WEIGHT: 196 LBS | DIASTOLIC BLOOD PRESSURE: 45 MMHG | HEIGHT: 63 IN

## 2022-12-10 LAB
ANION GAP SERPL CALCULATED.3IONS-SCNC: 10 MMOL/L (ref 7–15)
BUN SERPL-MCNC: 17.3 MG/DL (ref 8–23)
CALCIUM SERPL-MCNC: 8.7 MG/DL (ref 8.8–10.2)
CHLORIDE SERPL-SCNC: 104 MMOL/L (ref 98–107)
CREAT SERPL-MCNC: 1.01 MG/DL (ref 0.51–0.95)
DEPRECATED HCO3 PLAS-SCNC: 28 MMOL/L (ref 22–29)
GFR SERPL CREATININE-BSD FRML MDRD: 58 ML/MIN/1.73M2
GLUCOSE SERPL-MCNC: 107 MG/DL (ref 70–99)
PLATELET # BLD AUTO: 227 10E3/UL (ref 150–450)
POTASSIUM SERPL-SCNC: 3.1 MMOL/L (ref 3.4–5.3)
SODIUM SERPL-SCNC: 142 MMOL/L (ref 136–145)

## 2022-12-10 PROCEDURE — 250N000011 HC RX IP 250 OP 636: Performed by: COLON & RECTAL SURGERY

## 2022-12-10 PROCEDURE — 85049 AUTOMATED PLATELET COUNT: CPT | Performed by: COLON & RECTAL SURGERY

## 2022-12-10 PROCEDURE — 250N000013 HC RX MED GY IP 250 OP 250 PS 637: Performed by: COLON & RECTAL SURGERY

## 2022-12-10 PROCEDURE — 250N000013 HC RX MED GY IP 250 OP 250 PS 637: Performed by: PHYSICIAN ASSISTANT

## 2022-12-10 PROCEDURE — 80048 BASIC METABOLIC PNL TOTAL CA: CPT | Performed by: PHYSICIAN ASSISTANT

## 2022-12-10 PROCEDURE — 250N000013 HC RX MED GY IP 250 OP 250 PS 637: Performed by: ANESTHESIOLOGY

## 2022-12-10 PROCEDURE — 99232 SBSQ HOSP IP/OBS MODERATE 35: CPT | Performed by: INTERNAL MEDICINE

## 2022-12-10 PROCEDURE — 250N000013 HC RX MED GY IP 250 OP 250 PS 637: Performed by: INTERNAL MEDICINE

## 2022-12-10 PROCEDURE — 36415 COLL VENOUS BLD VENIPUNCTURE: CPT | Performed by: PHYSICIAN ASSISTANT

## 2022-12-10 RX ORDER — POTASSIUM CHLORIDE 1500 MG/1
20 TABLET, EXTENDED RELEASE ORAL 3 TIMES DAILY
Qty: 9 TABLET | Refills: 0
Start: 2022-12-10 | End: 2022-12-13

## 2022-12-10 RX ORDER — POTASSIUM CHLORIDE 1500 MG/1
40 TABLET, EXTENDED RELEASE ORAL ONCE
Status: COMPLETED | OUTPATIENT
Start: 2022-12-10 | End: 2022-12-10

## 2022-12-10 RX ADMIN — OXYCODONE HYDROCHLORIDE 10 MG: 5 TABLET ORAL at 00:03

## 2022-12-10 RX ADMIN — LEVOTHYROXINE SODIUM 25 MCG: 0.03 TABLET ORAL at 08:15

## 2022-12-10 RX ADMIN — ACETAMINOPHEN 975 MG: 325 TABLET, FILM COATED ORAL at 13:15

## 2022-12-10 RX ADMIN — ENOXAPARIN SODIUM 40 MG: 40 INJECTION SUBCUTANEOUS at 10:16

## 2022-12-10 RX ADMIN — ESCITALOPRAM OXALATE 20 MG: 20 TABLET ORAL at 08:16

## 2022-12-10 RX ADMIN — ACETAMINOPHEN 975 MG: 325 TABLET, FILM COATED ORAL at 04:37

## 2022-12-10 RX ADMIN — AMIODARONE HYDROCHLORIDE 200 MG: 200 TABLET ORAL at 10:12

## 2022-12-10 RX ADMIN — TORSEMIDE 30 MG: 10 TABLET ORAL at 08:16

## 2022-12-10 RX ADMIN — POTASSIUM CHLORIDE 40 MEQ: 1500 TABLET, EXTENDED RELEASE ORAL at 13:16

## 2022-12-10 ASSESSMENT — ACTIVITIES OF DAILY LIVING (ADL)
ADLS_ACUITY_SCORE: 20

## 2022-12-10 NOTE — PROGRESS NOTES
Hospitalist Medicine Progress Note   [facility]       Janet Figueroa is a 72 year old lady with history of paroxysmal atrial fibrillation on DOAC, chronic diastolic CHF, CAD, fibromyalgia, COPD, hypertension, stage III CKD, GERD, history of TIA who was admitted to [facility] 12/7/2022 with history of rectal cancer and low anterior resection with diverting loop ileostomy 5/28/2022 for a T1N0 adenocarcinoma who came in now for reversal of her ileostomy and underwent open ileostomy reversal by Dr. Lay Casarez 12/7/2022. 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 now s/p reversal of ileostomy done 12/7/2022  Management as per orthopedic service  Is now on low fiber diet with oral pain medications  Lovenox for DVT prophylaxis    Paroxysmal atrial fibrillation  Now in sinus rhythm  Will continue amiodarone and carvedilol  Eliquis will be restarted tomorrow 12/10/2022    Chronic diastolic CHF  LVEF is 55% in the echo done in August 2022  Torsemide will be restarted     Essential hypertension  At home on carvedilol, irbesartan, torsemide and amlodipine  Since the blood pressure is relatively on the lower side of normal will not start amlodipine today        Plan:   Can go home   Was asked to take thrice mccollum 20 meq x 3 days Potassium that she says she has at home and wants to go home   Will give 4 meq x 1 before she goes     Diet: Low Fiber Diet  Diet    DVT Prophylaxis: Enoxaparin (Lovenox) SQ  Gottlieb Catheter: Not present  Code Status: Full Code               The patient's care was discussed with the Patient      Gamaliel Barton MD  Hospitalist Service  [facility]    ______________________________________________________________________    Interval  History     Symptoms   Patient is going home     Review of Systems:   Denies any shortness of breath at rest  Denies any chest pain    Data reviewed today: I reviewed all medications, new labs and imaging results over the last 24 hours.     Physical Exam   Vital Signs: Temp: 98.3  F (36.8  C) Temp src: Axillary BP: 110/45 Pulse: 60   Resp: 20 SpO2: 92 % O2 Device: None (Room air)    Weight: 196 lbs 0 oz      GENERAL: Patient is not in acute distress  HEENT: EOM+,Conjunctiva is clear   NECK:  no Jugular Venous distention  HEART: S1 S2 regular Rate and Rhythm, there is  no murmur,   LUNGS: Respirations are  not laboured, Lungs are  clear to auscultation without Crepitations or Wheezing   ABDOMEN: Soft , there is minimal tenderness ,Bowel Sounds are  Positive incision was not examined  LOWER LIMBS: no  Pedal Edema  Bilaterally   CNS:  Alert,  Oriented x 3, Moving all the Four Limbs     Data   Recent Labs   Lab 12/10/22  0849 12/09/22  0949 12/08/22  0649 12/07/22  1838   HGB  --   --  11.5*  --      --   --  266    139 137  --    POTASSIUM 3.1* 3.8 3.5  --    CHLORIDE 104 102 103  --    CO2 28 26 24  --    BUN 17.3 14.0 9.8  --    CR 1.01* 0.97* 0.78  --    ANIONGAP 10 11 10  --    GISELLE 8.7* 8.7* 8.6*  --    * 115* 109*  --          No results found for this or any previous visit (from the past 24 hour(s)).

## 2022-12-10 NOTE — PROGRESS NOTES
Pt A/Ox4. VSS on RA. Reported pain, gave scheduled Tylenol. HR afib. Lungs clear, using spirometer. Abdominal incision CD&I, minimal dark red drainage, outer dressing reinforfced. Had BMx2, up Indep, went for walk w/ assist. Low fiber diet tolerated. L PIV CD&I, saline locked. Discharge in next few days pending progress.   Kriss Baez on 12/9/2022 at 10:19 PM

## 2022-12-10 NOTE — PROGRESS NOTES
Colorectal Surgery Progress Note    POD # 3  Interval History:  No acute events overnight. Tolerated full breakfast, passing flatus and stool    Physical Exam:   Temp:  [98.2  F (36.8  C)-98.5  F (36.9  C)] 98.3  F (36.8  C)  Pulse:  [53-70] 53  Resp:  [18-20] 20  BP: (110-151)/(45-62) 110/45  SpO2:  [91 %-97 %] 92 %  General: Alert, oriented, appears comfortable, NAD.  Respiratory: breathing non labored  Abdomen: Abdomen is soft, nontender, nondistended. Incision is clean, dry and intact without signs of infection    Data:   All laboratory and imaging data in the past 24 hours reviewed  I/O last 3 completed shifts:  In: 483 [P.O.:480; I.V.:3]  Out: 800 [Urine:800]  Recent Labs   Lab Test 12/08/22  0649 12/07/22  1838 05/16/22  0646 05/13/22  0731 05/10/22  0714 05/09/22  0833 05/07/22  1243 05/05/22  0710 05/04/22  0715 05/03/22  1113   WBC  --   --   --  6.5  --   --  9.9  --   --  7.4   HGB 11.5*  --   --  11.0*  --   --  13.0  --   --  14.8   PLT  --  266 395 301   < >  --  332  332  --    < > 389   INR  --   --  1.01  --   --  0.99  --  0.98  --   --     < > = values in this interval not displayed.      Recent Labs   Lab Test 12/09/22  0949 12/08/22  0649 12/07/22  1755 05/17/22  0507    137  --  138   POTASSIUM 3.8 3.5  --  4.1   CHLORIDE 102 103  --  109   CO2 26 24  --  23   BUN 14.0 9.8  --  32*   CR 0.97* 0.78 0.88 0.94   ANIONGAP 11 10  --  6   GISELLE 8.7* 8.6*  --  8.3*   * 109*  --  92        Recent Labs   Lab Test 05/16/22  0646   PROTTOTAL 5.8*   ALBUMIN 2.4*   BILITOTAL 0.5   ALKPHOS 90   AST 29   ALT 42       Assessment and Plan:     Home today    Raina Aguilar MD   Colorectal Surgery  [phone] [facility]  [phone] Office/Call service

## 2022-12-10 NOTE — PLAN OF CARE
A&O, VSS on RA. C/o intermittent abdominal discomfort, pain controlled with  po oxycodone x1 prn. Up Independent, BS hypoactive, multiple loose stools. Saline locked IV.  Tolerating low fiber. Will continue plan of care.

## 2022-12-10 NOTE — PROGRESS NOTES
Pt to D/C to home.  Pt provided with d/c instructions, including new medications, when medications were last given, and when to take them again.  Pt also informed to f/u with colorectal on 1/4/22.  Pt verbalized understanding of all d/c and f/u instructions. Pt demonstrated ability to pack & dress wound. All questions were answered at this time.  Copy of paperwork sent with pt.  Medication/Scripts sent with pt.  to provide transport.  All personal belongings sent with pt.

## 2022-12-12 NOTE — DISCHARGE SUMMARY
[facility] Discharge Summary      Janet Figueroa MRN# [id number]   Age: 72 year old YOB: 1950     Date of Admission:  12/7/2022  Date of Discharge::  12/10/2022  1:39 PM  Admitting Physician:  Lay Connolly MD  Discharge Physician:  Lay Connolly MD     PCP:  Marco Silva, Melissa PARRISH MD    Disposition: Patient discharged from [facility] to home in stable condition.        Primary Diagnosis:   Ileostomy status            Discharge Medications:     Discharge Medication List as of 12/10/2022  1:22 PM      START taking these medications    Details   oxyCODONE (ROXICODONE) 5 MG tablet Take 1 tablet (5 mg) by mouth every 6 hours as needed for moderate pain (4-6), Disp-15 tablet, R-0, Local Print         CONTINUE these medications which have NOT CHANGED    Details   acetaminophen (TYLENOL) 500 MG tablet Take 500-1,000 mg by mouth every 6 hours as needed for mild pain, Historical      albuterol (PROAIR HFA/PROVENTIL HFA/VENTOLIN HFA) 108 (90 Base) MCG/ACT inhaler Inhale 2 puffs into the lungs 4 times daily as needed, Historical      amiodarone (PACERONE) 200 MG tablet Take 1 tablet by mouth daily, Historical      amLODIPine (NORVASC) 10 MG tablet Take 10 mg by mouth daily, Historical      apixaban ANTICOAGULANT (ELIQUIS) 5 MG tablet Take 5 mg by mouth 2 times daily, Historical      atorvastatin (LIPITOR) 80 MG tablet Take 80 mg by mouth At Bedtime, Historical      carvedilol (COREG) 12.5 MG tablet Take 12.5 mg by mouth 2 times daily (with meals), Historical      escitalopram (LEXAPRO) 20 MG tablet Take 20 mg by mouth daily, Historical      irbesartan (AVAPRO) 300 MG tablet Take 150 mg by mouth daily, Historical      levothyroxine (SYNTHROID/LEVOTHROID) 25 MCG tablet Take 1 tablet by mouth daily, Historical      torsemide (DEMADEX) 20 MG tablet Take 30 mg by mouth daily, Historical      umeclidinium (INCRUSE ELLIPTA) 62.5 MCG/INH inhaler Inhale 1 puff into the lungs  daily, Historical                    Follow Up, Special Instructions:     Discharge diet: Low residue   Discharge activity: Lifting restricted to <15 pounds for 6 weeks   Discharge follow-up: Follow up with Dr. Connolly in 3-4 weeks   Wound care: Cover old stoma site with gauze and tape and change once daily              Procedures:     Procedure(s): Open ileostomy reversal                 Consultations:   Hospitalist          Brief Hospital Summary:     Patient is a 72 year old female who underwent an open ileostomy reversal on 12/7/22 by Dr. Connolly.   There were no immediate complications during this procedure.    Please refer to the full operative summary for details.  The patient's hospital course was unremarkable.  Pain was controlled on oral pain regimen.  She was tolerating a low fiber diet.  Bowel function had returned prior to discharge.  She recovered as anticipated and experienced no post-operative complications.         Attestation:  I have reviewed today's vital signs, notes, medications, labs and imaging.    Jayce Bundy PA-C  Colorectal Physician Assistant    [facility]  [address] [facility] [address]  [place] [address]  T: [phone]  F: [phone]            ADDENDUM:  Length of stay: 3 days  Indicate Y or N for the following:  UTI  No  C diff  No  PNA  No  SSI No  DVT No  PE  No  CVA No  MI No  Enterocutaneous fistula  No  Peripheral nerve injury  No  Abscess (not adjacent to anastomosis)  No  Leak No    Treated with:   Antibiotics N/A   Drain  N/A   Reoperation  N/A  Death within 30 days No  Reintubation  No  Reoperation  No   Procedure N/A

## (undated) DEVICE — SU PROLENE 2-0 SHDA 48" 8533H

## (undated) DEVICE — NDL SCLEROTHERAPY 25GA CARR-LOCK  00711811

## (undated) DEVICE — SUCTION CANISTER MEDIVAC LINER 3000ML W/LID 65651-530

## (undated) DEVICE — SUCTION TIP POOLE K770

## (undated) DEVICE — SU VICRYL 0 TIE 12X18" J906G

## (undated) DEVICE — KIT PATIENT POSITIONING PIGAZZI LATEX FREE 40580

## (undated) DEVICE — DRAPE IOBAN INCISE 23X17" 6650EZ

## (undated) DEVICE — DRAPE LEGGINGS 8421

## (undated) DEVICE — GLOVE BIOGEL PI ULTRATOUCH G SZ 7.0 42170

## (undated) DEVICE — SU VICRYL 2-0 TIE 12X18" J905T

## (undated) DEVICE — KIT ENDO TURNOVER/PROCEDURE W/CLEAN A SCOPE LINERS 103888

## (undated) DEVICE — SU MONOCRYL 4-0 PS-2 27" UND Y426H

## (undated) DEVICE — DAVINCI XI SEAL UNIVERSAL 5-8MM 470361

## (undated) DEVICE — PACK LAP CHOLE SLC15LCFSD

## (undated) DEVICE — Device

## (undated) DEVICE — NDL INSUFFLATION 13GA 120MM C2201

## (undated) DEVICE — SPONGE RAY-TEC 4X8" 7318

## (undated) DEVICE — SU VICRYL 3-0 SH 8X18" UND J864D

## (undated) DEVICE — DRAPE LAP TRANSVERSE 29421

## (undated) DEVICE — DAVINCI XI HANDPIECE ESU VESSEL SEALER 8MM EXT 480422

## (undated) DEVICE — PREP POVIDONE IODINE SCRUB 7.5% 120ML

## (undated) DEVICE — DAVINCI XI OBTURATOR BLADELESS 8MM 470359

## (undated) DEVICE — STPL DAVINCI SUREFORM 45MM STR TIPRELOAD 12FIRE 480445

## (undated) DEVICE — SYR 50ML LL W/O NDL 309653

## (undated) DEVICE — SU VICRYL 2-0 SH 27" J317H

## (undated) DEVICE — SU VICRYL 3-0 SH 27" J316H

## (undated) DEVICE — ESU GROUND PAD UNIVERSAL W/O CORD

## (undated) DEVICE — LINEN HALF SHEET 5512

## (undated) DEVICE — STPL LINEAR 90 X 3.5MM TA9035S

## (undated) DEVICE — SU PDS II 0 CTX 60" Z990G

## (undated) DEVICE — LUBRICANT INST KIT ENDO-LUBE 220-90

## (undated) DEVICE — LIGHT HANDLE X2

## (undated) DEVICE — DRAPE BREAST/CHEST 29420

## (undated) DEVICE — JELLY LUBRICATING SURGILUBE 4OZ TUBE

## (undated) DEVICE — INFLATION DEVICE BIG 60 ENDO-AN6012

## (undated) DEVICE — SYR 10ML FINGER CONTROL W/O NDL 309695

## (undated) DEVICE — STPL LINEAR CUT 75MM TLC75

## (undated) DEVICE — PACK MAJOR SBA15MAFSI

## (undated) DEVICE — SOL NACL 0.9% IRRIG 1000ML BOTTLE 2F7124

## (undated) DEVICE — SU MONOCRYL 4-0 PS-2 18" UND Y496G

## (undated) DEVICE — DAVINCI XI RETR ENDOWRIST SMALL GRAPTOR 470318

## (undated) DEVICE — DRAPE SHEET REV FOLD 3/4 9349

## (undated) DEVICE — TUBING CONMED AIRSEAL SMOKE EVAC INSUFFLATION ASM-EVAC

## (undated) DEVICE — TRAY PREP DRY SKIN SCRUB 067

## (undated) DEVICE — SOL WATER IRRIG 1000ML BOTTLE 2F7114

## (undated) DEVICE — TUBING SUCTION 12"X1/4" N612

## (undated) DEVICE — ESU GROUND PAD ADULT W/CORD E7507

## (undated) DEVICE — DRAPE LAP W/ARMBOARD 29410

## (undated) DEVICE — DRAIN JACKSON PRATT RESERVOIR 100ML SU130-1305

## (undated) DEVICE — BAG CLEAR TRASH 1.3M 39X33" P4040C

## (undated) DEVICE — DEVICE SUTURE GRASPER TROCAR CLOSURE 14GA PMITCSG

## (undated) DEVICE — ESU PENCIL W/HOLSTER E2350H

## (undated) DEVICE — SPONGE LAP 18X18" X8435

## (undated) DEVICE — DRAPE MAYO STAND 23X54 8337

## (undated) DEVICE — DAVINCI HOT SHEARS TIP COVER  400180

## (undated) DEVICE — SOL NACL 0.9% INJ 1000ML BAG 2B1324X

## (undated) DEVICE — ENDO TROCAR CONMED AIRSEAL BLADELESS 08X120MM IAS8-120LP

## (undated) DEVICE — DAVINCI XI DRAPE ARM 470015

## (undated) DEVICE — DAVINCI SEAL CANNULA AND STPL 12MM 470380

## (undated) DEVICE — DRSG GAUZE 4X4" 8044

## (undated) DEVICE — ENDO FORCEP ENDOJAW BIOPSY 2.8MMX230CM FB-220U

## (undated) DEVICE — LINEN FULL SHEET 5511

## (undated) DEVICE — DRAIN JACKSON PRATT CHANNEL 19FR ROUND HUBLESS SIL JP-2230

## (undated) DEVICE — DAVINCI XI REDUCER 8-12MM 470381

## (undated) DEVICE — GLOVE PROTEXIS MICRO 7.0  2D73PM70

## (undated) DEVICE — SYSTEM LAPAROVUE VISIBILITY LAPVUE10

## (undated) DEVICE — STPL CIRCULAR DST 28MM W/TILT TIP EEA28

## (undated) DEVICE — LINEN TOWEL PACK X5 5464

## (undated) DEVICE — DAVINCI XI GRASPER FENESTRATED TIP UP 8MM 470347

## (undated) DEVICE — LUBRICANT INST ELECTROLUBE EL101

## (undated) DEVICE — PREP CHLORAPREP 26ML TINTED HI-LITE ORANGE 930815

## (undated) DEVICE — ENDO MARKER SPOT 5ML

## (undated) DEVICE — STPL DAVINCI SUREFORM 45MM RELOAD GREEN 48345G

## (undated) DEVICE — PREP CHLORAPREP 26ML TINTED ORANGE  260815

## (undated) DEVICE — SYR BULB IRRIG 50ML LATEX FREE 0035280

## (undated) DEVICE — SUCTION IRR STRYKERFLOW II W/TIP 250-070-520

## (undated) DEVICE — DAVINCI XI DRAPE COLUMN 470341

## (undated) DEVICE — SU SILK 2-0 SH 30" K833H

## (undated) RX ORDER — GLYCOPYRROLATE 0.2 MG/ML
INJECTION INTRAMUSCULAR; INTRAVENOUS
Status: DISPENSED
Start: 2022-12-07

## (undated) RX ORDER — NEOSTIGMINE METHYLSULFATE 1 MG/ML
VIAL (ML) INJECTION
Status: DISPENSED
Start: 2022-12-07

## (undated) RX ORDER — ACETAMINOPHEN 325 MG/1
TABLET ORAL
Status: DISPENSED
Start: 2022-12-07

## (undated) RX ORDER — ACETAMINOPHEN 325 MG/1
TABLET ORAL
Status: DISPENSED
Start: 2022-04-28

## (undated) RX ORDER — EPINEPHRINE 1 MG/ML
INJECTION, SOLUTION INTRAMUSCULAR; SUBCUTANEOUS
Status: DISPENSED
Start: 2022-04-28

## (undated) RX ORDER — DEXAMETHASONE SODIUM PHOSPHATE 4 MG/ML
INJECTION, SOLUTION INTRA-ARTICULAR; INTRALESIONAL; INTRAMUSCULAR; INTRAVENOUS; SOFT TISSUE
Status: DISPENSED
Start: 2022-12-07

## (undated) RX ORDER — HEPARIN SODIUM 5000 [USP'U]/.5ML
INJECTION, SOLUTION INTRAVENOUS; SUBCUTANEOUS
Status: DISPENSED
Start: 2022-12-07

## (undated) RX ORDER — FENTANYL CITRATE 50 UG/ML
INJECTION, SOLUTION INTRAMUSCULAR; INTRAVENOUS
Status: DISPENSED
Start: 2022-12-07

## (undated) RX ORDER — PROPOFOL 10 MG/ML
INJECTION, EMULSION INTRAVENOUS
Status: DISPENSED
Start: 2022-12-07

## (undated) RX ORDER — GLYCOPYRROLATE 0.2 MG/ML
INJECTION, SOLUTION INTRAMUSCULAR; INTRAVENOUS
Status: DISPENSED
Start: 2022-04-28

## (undated) RX ORDER — INDOCYANINE GREEN AND WATER 25 MG
KIT INJECTION
Status: DISPENSED
Start: 2022-04-28

## (undated) RX ORDER — ERTAPENEM 1 G/1
INJECTION, POWDER, LYOPHILIZED, FOR SOLUTION INTRAMUSCULAR; INTRAVENOUS
Status: DISPENSED
Start: 2022-12-07

## (undated) RX ORDER — CLINDAMYCIN PHOSPHATE 900 MG/50ML
INJECTION, SOLUTION INTRAVENOUS
Status: DISPENSED
Start: 2022-04-28

## (undated) RX ORDER — HEPARIN SODIUM 5000 [USP'U]/.5ML
INJECTION, SOLUTION INTRAVENOUS; SUBCUTANEOUS
Status: DISPENSED
Start: 2022-04-28

## (undated) RX ORDER — HALOPERIDOL 5 MG/ML
INJECTION INTRAMUSCULAR
Status: DISPENSED
Start: 2022-12-07

## (undated) RX ORDER — HYDROMORPHONE HYDROCHLORIDE 1 MG/ML
INJECTION, SOLUTION INTRAMUSCULAR; INTRAVENOUS; SUBCUTANEOUS
Status: DISPENSED
Start: 2022-04-28

## (undated) RX ORDER — DEXAMETHASONE SODIUM PHOSPHATE 4 MG/ML
INJECTION, SOLUTION INTRA-ARTICULAR; INTRALESIONAL; INTRAMUSCULAR; INTRAVENOUS; SOFT TISSUE
Status: DISPENSED
Start: 2022-04-28

## (undated) RX ORDER — PROPOFOL 10 MG/ML
INJECTION, EMULSION INTRAVENOUS
Status: DISPENSED
Start: 2022-04-28

## (undated) RX ORDER — HYDROMORPHONE HCL IN WATER/PF 6 MG/30 ML
PATIENT CONTROLLED ANALGESIA SYRINGE INTRAVENOUS
Status: DISPENSED
Start: 2022-12-07

## (undated) RX ORDER — FENTANYL CITRATE 50 UG/ML
INJECTION, SOLUTION INTRAMUSCULAR; INTRAVENOUS
Status: DISPENSED
Start: 2022-04-28

## (undated) RX ORDER — ONDANSETRON 2 MG/ML
INJECTION INTRAMUSCULAR; INTRAVENOUS
Status: DISPENSED
Start: 2022-12-07

## (undated) RX ORDER — FENTANYL CITRATE 0.05 MG/ML
INJECTION, SOLUTION INTRAMUSCULAR; INTRAVENOUS
Status: DISPENSED
Start: 2022-03-15

## (undated) RX ORDER — BUPIVACAINE HYDROCHLORIDE AND EPINEPHRINE 2.5; 5 MG/ML; UG/ML
INJECTION, SOLUTION EPIDURAL; INFILTRATION; INTRACAUDAL; PERINEURAL
Status: DISPENSED
Start: 2022-12-07

## (undated) RX ORDER — LIDOCAINE HYDROCHLORIDE 10 MG/ML
INJECTION, SOLUTION EPIDURAL; INFILTRATION; INTRACAUDAL; PERINEURAL
Status: DISPENSED
Start: 2022-12-07